# Patient Record
Sex: FEMALE | Race: WHITE | NOT HISPANIC OR LATINO | Employment: FULL TIME | URBAN - METROPOLITAN AREA
[De-identification: names, ages, dates, MRNs, and addresses within clinical notes are randomized per-mention and may not be internally consistent; named-entity substitution may affect disease eponyms.]

---

## 2021-08-10 ENCOUNTER — HOSPITAL ENCOUNTER (INPATIENT)
Facility: HOSPITAL | Age: 26
LOS: 2 days | Discharge: HOME | End: 2021-08-13
Attending: INTERNAL MEDICINE | Admitting: INTERNAL MEDICINE

## 2021-08-10 DIAGNOSIS — K50.014 CROHN'S DISEASE OF SMALL INTESTINE WITH ABSCESS (HCC): Primary | ICD-10-CM

## 2021-08-10 PROBLEM — K50.90 INFLAMMATORY BOWEL DISEASE (CROHN'S DISEASE) (HCC): Chronic | Status: ACTIVE | Noted: 2021-08-10

## 2021-08-10 PROBLEM — K50.114 CROHN'S DISEASE OF COLON WITH ABSCESS (HCC): Status: ACTIVE | Noted: 2021-08-10

## 2021-08-10 PROCEDURE — 99222 1ST HOSP IP/OBS MODERATE 55: CPT | Performed by: PHYSICIAN ASSISTANT

## 2021-08-11 ENCOUNTER — APPOINTMENT (OUTPATIENT)
Dept: OTHER | Facility: HOSPITAL | Age: 26
End: 2021-08-11

## 2021-08-11 ENCOUNTER — INPATIENT HOSPITAL (OUTPATIENT)
Dept: URBAN - METROPOLITAN AREA HOSPITAL 84 | Facility: HOSPITAL | Age: 26
End: 2021-08-11

## 2021-08-11 DIAGNOSIS — R74.8 ABNORMAL LEVELS OF OTHER SERUM ENZYMES: ICD-10-CM

## 2021-08-11 DIAGNOSIS — R93.3 ABNORMAL FINDINGS ON DIAGNOSTIC IMAGING OF OTHER PARTS OF DI: ICD-10-CM

## 2021-08-11 DIAGNOSIS — D50.9 IRON DEFICIENCY ANEMIA, UNSPECIFIED: ICD-10-CM

## 2021-08-11 DIAGNOSIS — R10.31 RIGHT LOWER QUADRANT PAIN: ICD-10-CM

## 2021-08-11 DIAGNOSIS — K50.114 CROHN'S DISEASE OF LARGE INTESTINE WITH ABSCESS: ICD-10-CM

## 2021-08-11 PROBLEM — K50.90 EXACERBATION OF CROHN'S DISEASE (HCC): Status: ACTIVE | Noted: 2021-08-11

## 2021-08-11 PROBLEM — R31.9 HEMATURIA: Chronic | Status: ACTIVE | Noted: 2021-08-11

## 2021-08-11 PROBLEM — R74.01 TRANSAMINITIS: Status: ACTIVE | Noted: 2021-08-11

## 2021-08-11 PROBLEM — D75.839 THROMBOCYTOSIS: Chronic | Status: ACTIVE | Noted: 2021-08-11

## 2021-08-11 LAB
ABO GROUP BLD: NORMAL
ALBUMIN SERPL-MCNC: 3.1 G/DL (ref 3.5–5.2)
ALBUMIN/GLOB SERPL: 0.7 G/DL
ALP SERPL-CCNC: 129 U/L (ref 39–117)
ALT SERPL W P-5'-P-CCNC: 31 U/L (ref 1–33)
ANION GAP SERPL CALCULATED.3IONS-SCNC: 10 MMOL/L (ref 5–15)
ANION GAP SERPL CALCULATED.3IONS-SCNC: 19 MMOL/L (ref 5–15)
AST SERPL-CCNC: 21 U/L (ref 1–32)
B-HCG UR QL: NEGATIVE
BACTERIA UR QL AUTO: ABNORMAL /HPF
BASOPHILS # BLD AUTO: 0 10*3/MM3 (ref 0–0.2)
BASOPHILS NFR BLD AUTO: 0.4 % (ref 0–1.5)
BILIRUB SERPL-MCNC: 0.3 MG/DL (ref 0–1.2)
BILIRUB UR QL STRIP: NEGATIVE
BLD GP AB SCN SERPL QL: NEGATIVE
BUN SERPL-MCNC: 4 MG/DL (ref 6–20)
BUN SERPL-MCNC: 5 MG/DL (ref 6–20)
BUN/CREAT SERPL: 6.3 (ref 7–25)
BUN/CREAT SERPL: 6.3 (ref 7–25)
CALCIUM SPEC-SCNC: 8.5 MG/DL (ref 8.6–10.5)
CALCIUM SPEC-SCNC: 8.6 MG/DL (ref 8.6–10.5)
CHLORIDE SERPL-SCNC: 106 MMOL/L (ref 98–107)
CHLORIDE SERPL-SCNC: 113 MMOL/L (ref 98–107)
CLARITY UR: CLEAR
CO2 SERPL-SCNC: 20 MMOL/L (ref 22–29)
CO2 SERPL-SCNC: 23 MMOL/L (ref 22–29)
COLOR UR: YELLOW
CREAT SERPL-MCNC: 0.64 MG/DL (ref 0.57–1)
CREAT SERPL-MCNC: 0.8 MG/DL (ref 0.57–1)
CRP SERPL-MCNC: 21.03 MG/DL (ref 0–0.5)
D-LACTATE SERPL-SCNC: 1 MMOL/L (ref 0.5–2)
DEPRECATED RDW RBC AUTO: 39.8 FL (ref 37–54)
EOSINOPHIL # BLD AUTO: 0.1 10*3/MM3 (ref 0–0.4)
EOSINOPHIL NFR BLD AUTO: 0.9 % (ref 0.3–6.2)
ERYTHROCYTE [DISTWIDTH] IN BLOOD BY AUTOMATED COUNT: 15.6 % (ref 12.3–15.4)
ERYTHROCYTE [SEDIMENTATION RATE] IN BLOOD: 101 MM/HR (ref 0–20)
GFR SERPL CREATININE-BSD FRML MDRD: 113 ML/MIN/1.73
GFR SERPL CREATININE-BSD FRML MDRD: 87 ML/MIN/1.73
GLOBULIN UR ELPH-MCNC: 4.3 GM/DL
GLUCOSE SERPL-MCNC: 77 MG/DL (ref 65–99)
GLUCOSE SERPL-MCNC: 98 MG/DL (ref 65–99)
GLUCOSE UR STRIP-MCNC: NEGATIVE MG/DL
HCT VFR BLD AUTO: 32.9 % (ref 34–46.6)
HGB BLD-MCNC: 10.8 G/DL (ref 12–15.9)
HGB UR QL STRIP.AUTO: ABNORMAL
HOLD SPECIMEN: NORMAL
HYALINE CASTS UR QL AUTO: ABNORMAL /LPF
KETONES UR QL STRIP: ABNORMAL
LEUKOCYTE ESTERASE UR QL STRIP.AUTO: NEGATIVE
LIPASE SERPL-CCNC: 7 U/L (ref 13–60)
LYMPHOCYTES # BLD AUTO: 2.3 10*3/MM3 (ref 0.7–3.1)
LYMPHOCYTES NFR BLD AUTO: 22.1 % (ref 19.6–45.3)
MAGNESIUM SERPL-MCNC: 2.1 MG/DL (ref 1.6–2.6)
MCH RBC QN AUTO: 23.9 PG (ref 26.6–33)
MCHC RBC AUTO-ENTMCNC: 32.7 G/DL (ref 31.5–35.7)
MCV RBC AUTO: 72.9 FL (ref 79–97)
MONOCYTES # BLD AUTO: 1 10*3/MM3 (ref 0.1–0.9)
MONOCYTES NFR BLD AUTO: 9.5 % (ref 5–12)
NEUTROPHILS NFR BLD AUTO: 67.1 % (ref 42.7–76)
NEUTROPHILS NFR BLD AUTO: 7.1 10*3/MM3 (ref 1.7–7)
NITRITE UR QL STRIP: NEGATIVE
NRBC BLD AUTO-RTO: 0 /100 WBC (ref 0–0.2)
PH UR STRIP.AUTO: 6 [PH] (ref 5–8)
PLATELET # BLD AUTO: 666 10*3/MM3 (ref 140–450)
PMV BLD AUTO: 6.6 FL (ref 6–12)
POTASSIUM SERPL-SCNC: 3.4 MMOL/L (ref 3.5–5.2)
POTASSIUM SERPL-SCNC: 3.9 MMOL/L (ref 3.5–5.2)
PROT SERPL-MCNC: 7.4 G/DL (ref 6–8.5)
PROT UR QL STRIP: NEGATIVE
RBC # BLD AUTO: 4.51 10*6/MM3 (ref 3.77–5.28)
RBC # UR: ABNORMAL /HPF
REF LAB TEST METHOD: ABNORMAL
RH BLD: NEGATIVE
SARS-COV-2 RNA PNL SPEC NAA+PROBE: NOT DETECTED
SODIUM SERPL-SCNC: 139 MMOL/L (ref 136–145)
SODIUM SERPL-SCNC: 152 MMOL/L (ref 136–145)
SP GR UR STRIP: 1.04 (ref 1–1.03)
SQUAMOUS #/AREA URNS HPF: ABNORMAL /HPF
T&S EXPIRATION DATE: NORMAL
UROBILINOGEN UR QL STRIP: ABNORMAL
WBC # BLD AUTO: 10.6 10*3/MM3 (ref 3.4–10.8)
WBC UR QL AUTO: ABNORMAL /HPF
WHOLE BLOOD HOLD SPECIMEN: NORMAL

## 2021-08-11 PROCEDURE — 80053 COMPREHEN METABOLIC PANEL: CPT | Performed by: PHYSICIAN ASSISTANT

## 2021-08-11 PROCEDURE — 25010000002 HYDROMORPHONE PER 4 MG: Performed by: STUDENT IN AN ORGANIZED HEALTH CARE EDUCATION/TRAINING PROGRAM

## 2021-08-11 PROCEDURE — U0003 INFECTIOUS AGENT DETECTION BY NUCLEIC ACID (DNA OR RNA); SEVERE ACUTE RESPIRATORY SYNDROME CORONAVIRUS 2 (SARS-COV-2) (CORONAVIRUS DISEASE [COVID-19]), AMPLIFIED PROBE TECHNIQUE, MAKING USE OF HIGH THROUGHPUT TECHNOLOGIES AS DESCRIBED BY CMS-2020-01-R: HCPCS | Performed by: INTERNAL MEDICINE

## 2021-08-11 PROCEDURE — 83690 ASSAY OF LIPASE: CPT | Performed by: PHYSICIAN ASSISTANT

## 2021-08-11 PROCEDURE — 86900 BLOOD TYPING SEROLOGIC ABO: CPT | Performed by: PHYSICIAN ASSISTANT

## 2021-08-11 PROCEDURE — 85652 RBC SED RATE AUTOMATED: CPT | Performed by: PHYSICIAN ASSISTANT

## 2021-08-11 PROCEDURE — 86900 BLOOD TYPING SEROLOGIC ABO: CPT

## 2021-08-11 PROCEDURE — 86140 C-REACTIVE PROTEIN: CPT | Performed by: PHYSICIAN ASSISTANT

## 2021-08-11 PROCEDURE — 81001 URINALYSIS AUTO W/SCOPE: CPT | Performed by: PHYSICIAN ASSISTANT

## 2021-08-11 PROCEDURE — 86901 BLOOD TYPING SEROLOGIC RH(D): CPT

## 2021-08-11 PROCEDURE — 25010000002 MORPHINE PER 10 MG: Performed by: PHYSICIAN ASSISTANT

## 2021-08-11 PROCEDURE — 80048 BASIC METABOLIC PNL TOTAL CA: CPT | Performed by: INTERNAL MEDICINE

## 2021-08-11 PROCEDURE — 81025 URINE PREGNANCY TEST: CPT | Performed by: PHYSICIAN ASSISTANT

## 2021-08-11 PROCEDURE — 83735 ASSAY OF MAGNESIUM: CPT | Performed by: PHYSICIAN ASSISTANT

## 2021-08-11 PROCEDURE — 86901 BLOOD TYPING SEROLOGIC RH(D): CPT | Performed by: PHYSICIAN ASSISTANT

## 2021-08-11 PROCEDURE — 99233 SBSQ HOSP IP/OBS HIGH 50: CPT | Performed by: INTERNAL MEDICINE

## 2021-08-11 PROCEDURE — 25010000002 PIPERACILLIN SOD-TAZOBACTAM PER 1 G: Performed by: PHYSICIAN ASSISTANT

## 2021-08-11 PROCEDURE — 83605 ASSAY OF LACTIC ACID: CPT | Performed by: PHYSICIAN ASSISTANT

## 2021-08-11 PROCEDURE — 25010000002 ONDANSETRON PER 1 MG: Performed by: PHYSICIAN ASSISTANT

## 2021-08-11 PROCEDURE — 85025 COMPLETE CBC W/AUTO DIFF WBC: CPT | Performed by: INTERNAL MEDICINE

## 2021-08-11 PROCEDURE — 99222 1ST HOSP IP/OBS MODERATE 55: CPT | Performed by: NURSE PRACTITIONER

## 2021-08-11 PROCEDURE — 87040 BLOOD CULTURE FOR BACTERIA: CPT | Performed by: PHYSICIAN ASSISTANT

## 2021-08-11 PROCEDURE — 99221 1ST HOSP IP/OBS SF/LOW 40: CPT | Performed by: STUDENT IN AN ORGANIZED HEALTH CARE EDUCATION/TRAINING PROGRAM

## 2021-08-11 PROCEDURE — 86850 RBC ANTIBODY SCREEN: CPT | Performed by: PHYSICIAN ASSISTANT

## 2021-08-11 PROCEDURE — 87086 URINE CULTURE/COLONY COUNT: CPT | Performed by: PHYSICIAN ASSISTANT

## 2021-08-11 RX ORDER — ACETAMINOPHEN 160 MG/5ML
650 SOLUTION ORAL EVERY 4 HOURS PRN
Status: DISCONTINUED | OUTPATIENT
Start: 2021-08-10 | End: 2021-08-12

## 2021-08-11 RX ORDER — NITROGLYCERIN 0.4 MG/1
0.4 TABLET SUBLINGUAL
Status: DISCONTINUED | OUTPATIENT
Start: 2021-08-10 | End: 2021-08-13 | Stop reason: HOSPADM

## 2021-08-11 RX ORDER — MAGNESIUM SULFATE 1 G/100ML
1 INJECTION INTRAVENOUS AS NEEDED
Status: DISCONTINUED | OUTPATIENT
Start: 2021-08-10 | End: 2021-08-13 | Stop reason: HOSPADM

## 2021-08-11 RX ORDER — ALUMINA, MAGNESIA, AND SIMETHICONE 2400; 2400; 240 MG/30ML; MG/30ML; MG/30ML
15 SUSPENSION ORAL EVERY 6 HOURS PRN
Status: DISCONTINUED | OUTPATIENT
Start: 2021-08-10 | End: 2021-08-13 | Stop reason: HOSPADM

## 2021-08-11 RX ORDER — MAGNESIUM SULFATE HEPTAHYDRATE 40 MG/ML
2 INJECTION, SOLUTION INTRAVENOUS AS NEEDED
Status: DISCONTINUED | OUTPATIENT
Start: 2021-08-10 | End: 2021-08-13 | Stop reason: HOSPADM

## 2021-08-11 RX ORDER — SODIUM CHLORIDE, SODIUM LACTATE, POTASSIUM CHLORIDE, CALCIUM CHLORIDE 600; 310; 30; 20 MG/100ML; MG/100ML; MG/100ML; MG/100ML
100 INJECTION, SOLUTION INTRAVENOUS CONTINUOUS
Status: DISCONTINUED | OUTPATIENT
Start: 2021-08-11 | End: 2021-08-12

## 2021-08-11 RX ORDER — SODIUM CHLORIDE 9 MG/ML
75 INJECTION, SOLUTION INTRAVENOUS CONTINUOUS
Status: DISCONTINUED | OUTPATIENT
Start: 2021-08-11 | End: 2021-08-11

## 2021-08-11 RX ORDER — CHOLECALCIFEROL (VITAMIN D3) 125 MCG
5 CAPSULE ORAL NIGHTLY PRN
Status: DISCONTINUED | OUTPATIENT
Start: 2021-08-10 | End: 2021-08-11

## 2021-08-11 RX ORDER — ZOLPIDEM TARTRATE 5 MG/1
5 TABLET ORAL NIGHTLY PRN
Status: DISCONTINUED | OUTPATIENT
Start: 2021-08-11 | End: 2021-08-13 | Stop reason: HOSPADM

## 2021-08-11 RX ORDER — ONDANSETRON 4 MG/1
4 TABLET, FILM COATED ORAL EVERY 6 HOURS PRN
Status: DISCONTINUED | OUTPATIENT
Start: 2021-08-10 | End: 2021-08-13 | Stop reason: HOSPADM

## 2021-08-11 RX ORDER — HYDROMORPHONE HCL 110MG/55ML
0.5 PATIENT CONTROLLED ANALGESIA SYRINGE INTRAVENOUS
Status: DISCONTINUED | OUTPATIENT
Start: 2021-08-11 | End: 2021-08-12

## 2021-08-11 RX ORDER — ACETAMINOPHEN 325 MG/1
650 TABLET ORAL EVERY 4 HOURS PRN
Status: DISCONTINUED | OUTPATIENT
Start: 2021-08-10 | End: 2021-08-12

## 2021-08-11 RX ORDER — MORPHINE SULFATE 4 MG/ML
4 INJECTION, SOLUTION INTRAMUSCULAR; INTRAVENOUS EVERY 4 HOURS PRN
Status: DISCONTINUED | OUTPATIENT
Start: 2021-08-11 | End: 2021-08-11

## 2021-08-11 RX ORDER — POTASSIUM CHLORIDE 20 MEQ/1
40 TABLET, EXTENDED RELEASE ORAL AS NEEDED
Status: DISCONTINUED | OUTPATIENT
Start: 2021-08-10 | End: 2021-08-13 | Stop reason: HOSPADM

## 2021-08-11 RX ORDER — ACETAMINOPHEN 650 MG/1
650 SUPPOSITORY RECTAL EVERY 4 HOURS PRN
Status: DISCONTINUED | OUTPATIENT
Start: 2021-08-10 | End: 2021-08-12

## 2021-08-11 RX ORDER — BUDESONIDE 3 MG/1
9 CAPSULE, COATED PELLETS ORAL EVERY MORNING
COMMUNITY
End: 2021-08-13 | Stop reason: HOSPADM

## 2021-08-11 RX ORDER — ONDANSETRON 2 MG/ML
4 INJECTION INTRAMUSCULAR; INTRAVENOUS EVERY 6 HOURS PRN
Status: DISCONTINUED | OUTPATIENT
Start: 2021-08-10 | End: 2021-08-13 | Stop reason: HOSPADM

## 2021-08-11 RX ORDER — FERROUS SULFATE 325(65) MG
325 TABLET ORAL
COMMUNITY

## 2021-08-11 RX ORDER — SODIUM CHLORIDE 0.9 % (FLUSH) 0.9 %
10 SYRINGE (ML) INJECTION EVERY 12 HOURS SCHEDULED
Status: DISCONTINUED | OUTPATIENT
Start: 2021-08-11 | End: 2021-08-13 | Stop reason: HOSPADM

## 2021-08-11 RX ORDER — SODIUM CHLORIDE 0.9 % (FLUSH) 0.9 %
10 SYRINGE (ML) INJECTION AS NEEDED
Status: DISCONTINUED | OUTPATIENT
Start: 2021-08-10 | End: 2021-08-13 | Stop reason: HOSPADM

## 2021-08-11 RX ADMIN — SODIUM CHLORIDE, POTASSIUM CHLORIDE, SODIUM LACTATE AND CALCIUM CHLORIDE 100 ML/HR: 600; 310; 30; 20 INJECTION, SOLUTION INTRAVENOUS at 08:25

## 2021-08-11 RX ADMIN — MORPHINE SULFATE 4 MG: 4 INJECTION INTRAVENOUS at 05:08

## 2021-08-11 RX ADMIN — Medication 5 MG: at 01:12

## 2021-08-11 RX ADMIN — HYDROMORPHONE HYDROCHLORIDE 0.5 MG: 2 INJECTION, SOLUTION INTRAMUSCULAR; INTRAVENOUS; SUBCUTANEOUS at 20:38

## 2021-08-11 RX ADMIN — HYDROMORPHONE HYDROCHLORIDE 0.5 MG: 2 INJECTION, SOLUTION INTRAMUSCULAR; INTRAVENOUS; SUBCUTANEOUS at 11:09

## 2021-08-11 RX ADMIN — ONDANSETRON 4 MG: 2 INJECTION INTRAMUSCULAR; INTRAVENOUS at 04:31

## 2021-08-11 RX ADMIN — MORPHINE SULFATE 4 MG: 4 INJECTION INTRAVENOUS at 01:12

## 2021-08-11 RX ADMIN — PIPERACILLIN AND TAZOBACTAM 3.38 G: 3; .375 INJECTION, POWDER, LYOPHILIZED, FOR SOLUTION INTRAVENOUS at 08:23

## 2021-08-11 RX ADMIN — SODIUM CHLORIDE 75 ML/HR: 9 INJECTION, SOLUTION INTRAVENOUS at 01:05

## 2021-08-11 RX ADMIN — HYDROMORPHONE HYDROCHLORIDE 0.5 MG: 2 INJECTION, SOLUTION INTRAMUSCULAR; INTRAVENOUS; SUBCUTANEOUS at 08:24

## 2021-08-11 RX ADMIN — Medication 10 ML: at 08:28

## 2021-08-11 RX ADMIN — PIPERACILLIN SODIUM,TAZOBACTAM SODIUM 3.38 G: 3; .375 INJECTION, POWDER, FOR SOLUTION INTRAVENOUS at 03:33

## 2021-08-11 RX ADMIN — ZOLPIDEM TARTRATE 5 MG: 5 TABLET ORAL at 21:10

## 2021-08-11 RX ADMIN — HYDROMORPHONE HYDROCHLORIDE 0.5 MG: 2 INJECTION, SOLUTION INTRAMUSCULAR; INTRAVENOUS; SUBCUTANEOUS at 13:08

## 2021-08-11 RX ADMIN — ONDANSETRON 4 MG: 2 INJECTION INTRAMUSCULAR; INTRAVENOUS at 18:17

## 2021-08-11 RX ADMIN — PIPERACILLIN AND TAZOBACTAM 3.38 G: 3; .375 INJECTION, POWDER, LYOPHILIZED, FOR SOLUTION INTRAVENOUS at 16:59

## 2021-08-11 RX ADMIN — Medication 10 ML: at 20:38

## 2021-08-11 RX ADMIN — HYDROMORPHONE HYDROCHLORIDE 0.5 MG: 2 INJECTION, SOLUTION INTRAMUSCULAR; INTRAVENOUS; SUBCUTANEOUS at 17:05

## 2021-08-11 RX ADMIN — Medication 10 ML: at 01:16

## 2021-08-11 NOTE — PLAN OF CARE
Goal Outcome Evaluation:  Plan of Care Reviewed With: patient, mother        Progress: improving  Outcome Summary: No stools this AM. Does have c/o abd.pain. Pain meds changed. Surgery has seen & no surgical intervention needed. GI consult ordered.

## 2021-08-11 NOTE — CASE MANAGEMENT/SOCIAL WORK
Discharge Planning Assessment   [facility]     Patient Name: Carin Price  MRN: [id number]  Today's Date: 8/11/2021    Admit Date: 8/10/2021    Discharge Needs Assessment     Row Name 08/11/21 1225       Living Environment    Lives With  spouse    Current Living Arrangements  home/apartment/condo    Primary Care Provided by  self    Provides Primary Care For  no one    Able to Return to Prior Arrangements  yes       Resource/Environmental Concerns    Resource/Environmental Concerns  none    Transportation Concerns  car, none       Transition Planning    Patient/Family Anticipates Transition to  home with family    Patient/Family Anticipated Services at Transition  none       Discharge Needs Assessment    Readmission Within the Last 30 Days  no previous admission in last 30 days    Equipment Currently Used at Home  none    Concerns to be Addressed  denies needs/concerns at this time    Equipment Needed After Discharge  none    Discharge Coordination/Progress  DC Plan: Return home with spouse, no needs.        Discharge Plan     Row Name 08/11/21 1226       Plan    Plan  DC Plan: Return home with spouse, no needs. IV antibiotics.    Plan Comments  GI consult.        Continued Care and Services - Admitted Since 8/10/2021    Coordination has not been started for this encounter.         Demographic Summary     Row Name 08/11/21 1224       General Information    Admission Type  inpatient    Arrived From  emergency department    Referral Source  admission list    Reason for Consult  discharge planning    Preferred Language  English     Used During This Interaction  no    General Information Comments  Spoke to pt in room with mother and spouse.        Functional Status     Row Name 08/11/21 1225       Functional Status    Usual Activity Tolerance  good    Current Activity Tolerance  good       Functional Status, IADL    Medications  independent    Meal Preparation  independent    Housekeeping  independent    Laundry   independent    Shopping  independent       Mental Status    General Appearance WDL  WDL       Mental Status Summary    Recent Changes in Mental Status/Cognitive Functioning  no changes        Met with patient in room wearing PPE: mask, face, goggles, gloves, gown.      Maintained distance greater than six feet and spent less than 15 minutes in the room.               Eduardo Lyons RN

## 2021-08-11 NOTE — NURSING NOTE
Pt has not had any stools since arriving .  She report prn pain Morphine 4 mg IV, is effective only short term, within one hour after receiving pt. Is requesting additional pain med.

## 2021-08-11 NOTE — PLAN OF CARE
Goal Outcome Evaluation:     No change to report in pt. Status.  She continues to complain of mid upper abd pain, with slight radiation to the right side.  Described as off and on, and sharp in intensity.  Denies any nausea or vomiting.  She has received x 1 dose of Morphine at 0112, net due at 0512, and pt. Currently ranks pain at 7/10. No stools to report since arriving to the hospital.  Pt. Reports x 7 liquid stools at home yesterday.  Continue to monitor.

## 2021-08-11 NOTE — PROGRESS NOTES
[facility] Medicine Services Daily Progress Note    Patient Name: Carin Price  : 1995  MRN: [id number]  Primary Care Physician:  Janeth, No Known  Date of admission: 8/10/2021      Subjective      Chief Complaint:   *    Abdominal pain and diarrhea    History of Present Illness: Carin Price is a 25 y.o. female who presented to [facility] on 8/10/2021 complaining of abdominal pain for the past 4 days.  Patient states abdominal pain is about a 6 or 7 out of 10 that is somewhat diffuse in nature.  Patient states the pain is nonradiating.  Patient reports nothing seems to make the pain better or worse.  Patient reports some nausea but no vomiting.  Patient reports about 4 days of diarrhea but denies any blood or melena.  Patient denies any urinary symptoms, fever, cough, chest pain, shortness of breath or swelling in her legs bilaterally.  Of note, patient states she just started her menstrual cycle yesterday.  Patient reports that she has a history of Crohn's and this felt like a similar exacerbation to the past however the pain was not getting any better so she went to the ER at [facility].     In the ER [facility], CT abdomen pelvis with contrast shows complex process in the ileocolic mesentery probably representing fistulizing Crohn's with presumed abscess.  Worse since May 10, 2021.  UA showed trace blood but otherwise unremarkable.  Urine hCG negative.  CMP was remarkable for AST and ALT slightly elevated at 47 and 56 respectively as well as alk phos elevated at 177.  Lactic acid normal.  Lipase normal.  White blood cell count elevated 15.5, hemoglobin of 11.7, low MCV and low MCH.  Platelets elevated at 769 what appears to be chronic per ED provider.  Patient was given Dilaudid, morphine, Zofran and Zosyn in the ED.       patient Reports    Patient reports generalized abdominal discomfort and diarrhea has resolved        Patient seen  and examined earlier today  She had no further diarrhea after admission  We are waiting on her stool studies  She was seen by general surgeon who called me and recommended no surgical intervention is required at this time and ask for GI consultation  GI consulted for further assistance with her care          ROS *all other systems reviewed and negative      Objective      Vitals:   Temp:  [98 °F (36.7 °C)-98.4 °F (36.9 °C)] 98.4 °F (36.9 °C)  Heart Rate:  [76-88] 76  Resp:  [17] 17  BP: (128-149)/(74-89) 128/74    Physical Exam  Vitals and nursing note reviewed.   Constitutional:       General: She is not in acute distress.     Appearance: Normal appearance. She is well-developed. She is not ill-appearing, toxic-appearing or diaphoretic.   HENT:      Head: Normocephalic and atraumatic.      Right Ear: Ear canal and external ear normal.      Left Ear: Ear canal and external ear normal.      Nose: Nose normal. No congestion or rhinorrhea.      Mouth/Throat:      Mouth: Mucous membranes are moist.      Pharynx: No oropharyngeal exudate.   Eyes:      General: No scleral icterus.        Right eye: No discharge.         Left eye: No discharge.      Extraocular Movements: Extraocular movements intact.      Conjunctiva/sclera: Conjunctivae normal.      Pupils: Pupils are equal, round, and reactive to light.   Neck:      Thyroid: No thyromegaly.      Vascular: No carotid bruit or JVD.      Trachea: No tracheal deviation.   Cardiovascular:      Rate and Rhythm: Normal rate and regular rhythm.      Pulses: Normal pulses.      Heart sounds: Normal heart sounds. No murmur heard.   No friction rub. No gallop.    Pulmonary:      Effort: Pulmonary effort is normal. No respiratory distress.      Breath sounds: Normal breath sounds. No stridor. No wheezing, rhonchi or rales.   Chest:      Chest wall: No tenderness.   Abdominal:      General: Bowel sounds are normal. There is no distension.      Palpations: Abdomen is soft. There is  no mass.      Tenderness: There is abdominal tenderness in the right lower quadrant. There is no guarding or rebound.      Hernia: No hernia is present.   Musculoskeletal:         General: No swelling, tenderness, deformity or signs of injury. Normal range of motion.      Cervical back: Normal range of motion and neck supple. No rigidity. No muscular tenderness.      Right lower leg: No edema.      Left lower leg: No edema.   Lymphadenopathy:      Cervical: No cervical adenopathy.   Skin:     General: Skin is warm and dry.      Coloration: Skin is not jaundiced or pale.      Findings: No bruising, erythema or rash.   Neurological:      General: No focal deficit present.      Mental Status: She is alert and oriented to person, place, and time. Mental status is at baseline.      Cranial Nerves: No cranial nerve deficit.      Sensory: No sensory deficit.      Motor: No weakness or abnormal muscle tone.      Coordination: Coordination normal.   Psychiatric:         Mood and Affect: Mood normal.         Behavior: Behavior normal.         Thought Content: Thought content normal.         Judgment: Judgment normal.            Result Review    Result Review:  I have personally reviewed the results from the time of this admission to 8/11/2021 09:03 EDT and agree with these findings:  [x]  Laboratory  [x]  Microbiology  [x]  Radiology  [x]  EKG/Telemetry   [x]  Cardiology/Vascular   []  Pathology  []  Old records  []  Other:  Most notable findings include:            Assessment/Plan      Brief Patient Summary:  Carin Price is a 25 y.o. female who presents with Crohn's flare      piperacillin-tazobactam, 3.375 g, Intravenous, Q8H  sodium chloride, 10 mL, Intravenous, Q12H       lactated ringers, 100 mL/hr, Last Rate: 100 mL/hr (08/11/21 1795)         Active Hospital Problems:  Active Hospital Problems    Diagnosis    • **Crohn's disease of colon with abscess (CMS/HCC)    • Exacerbation of Crohn's disease (CMS/HCC)    •  Hematuria    • Transaminitis    • Microcytic hypochromic anemia    • Thrombocytosis (CMS/HCC)    • Inflammatory bowel disease (Crohn's disease) (CMS/HCC)      Plan:   Exacerbation of Crohn's disease with fistulization and abscess  -Likely cause of abdominal pain  -CT abdomen pelvis with contrast shows complex process in the ileocolic mesentery probably representing fistulizing Crohn's with presumed abscess.  Worse since May 10, 2021.     -White blood cell count elevated 15.5, Lactic acid normal.   -Patient was given Dilaudid, morphine, Zofran and Zosyn in the ED.   -General surgery consulted, Dr. Laureano who agreed to see patient upon transfer to [facility]  -Monitor labs  Replete electrolytes as needed  hCG negative  -Continue Zosyn  -Morphine as needed for pain control, Zofran as needed for nausea  -Continue IV fluids 75 mL/h normal saline  -N.p.o. until seen by GI  -General surgeon reports no intervention at this time required      Hypernatremia possibly due to poor free water oral intake  She was changed by , general surgeon  Transaminitis  -CMP was remarkable for AST and ALT slightly elevated at 47 and 56 respectively as well as alk phos elevated at 177.      Microcytic hypochromic anemia  -Patient is currently on menstrual cycle, possibly secondary to iron deficiency  -hemoglobin of 11.7, low MCV and low MCH.   -Continue iron supplement when no longer n.p.o.  -Monitor hemoglobin     Thrombocytosis  -Platelets elevated at 769 what appears to be chronic per ED provider.    Likely reactive  We will monitor      DVT prophylaxis:  Mechanical DVT prophylaxis orders are present.    CODE STATUS:    Code Status: CPR  Medical Interventions (Level of Support Prior to Arrest): Full      Disposition:  I expect patient to be discharged home      This patient has been examined wearing appropriate Personal Protective Equipment and discussed with hospital infection control department. 08/11/21      Electronically signed by  Mo Estrella MD, 08/11/21, 09:03 EDT.  [facility]ist Team

## 2021-08-11 NOTE — CONSULTS
General Surgery Consult Note      Name: Carin Price ADMIT: 8/10/2021   : 1995  PCP: Provider, No Known    MRN: [id number] LOS: 0 days   AGE/SEX: 25 y.o. female  ROOM: [address] [place] [address]      Patient Care Team:  Provider, No Known as PCP - General  No chief complaint on file.      Subjective   25-year-old lady with recent diagnosis of Crohn's in May after she presented to an outside facility with right lower quadrant abdominal pain and fever.  She underwent CT scan at that time, images are not available here, but she also underwent colonoscopy confirming the diagnosis of Crohn's.  She sees a gastroenterologist in [facility], and was started on budesonide therapy.  She had been doing well on therapy with no pain no frequent stools, no bloody bowel movements.  However, she ran out of her budesonide earlier this week and after that she developed right lower quadrant as well as epigastric abdominal pain as well as a fever to 102.  She denies nausea or vomiting, she has been passing flatus however has not had a bowel movement in 2 days.  She presented to an outside facility in [facility] and underwent a CT scan of her abdomen and pelvis showing what was read as an abscess at her terminal terminal ileum with suspected fistulizing Crohn's.  She was transferred to our facility for surgical evaluation as well as possible drain placement.  This morning on my evaluation her fever has resolved, her leukocytosis has resolved, she continues to have no nausea or vomiting, she still has not had a bowel movement, her pain is improving.    Past Medical History:   Diagnosis Date   • Crohn's disease (CMS/HCC)      Past Surgical History:   Procedure Laterality Date   • LAPAROSCOPIC CHOLECYSTECTOMY       Family History   Problem Relation Age of Onset   • No Known Problems Mother    • No Known Problems Father      Social History     Tobacco Use   • Smoking status: Never Smoker   Vaping Use   • Vaping Use: Never used  "  Substance Use Topics   • Alcohol use: Not Currently   • Drug use: Never     Medications Prior to Admission   Medication Sig Dispense Refill Last Dose   • Budesonide (ENTOCORT EC) 3 MG 24 hr capsule Take 9 mg by mouth Every Morning.      • ferrous sulfate 325 (65 FE) MG tablet Take 325 mg by mouth Daily With Breakfast.        piperacillin-tazobactam, 3.375 g, Intravenous, Q8H  sodium chloride, 10 mL, Intravenous, Q12H      lactated ringers, 100 mL/hr      •  acetaminophen **OR** acetaminophen **OR** acetaminophen  •  aluminum-magnesium hydroxide-simethicone  •  HYDROmorphone  •  magnesium sulfate **OR** magnesium sulfate in D5W 1g/100mL (PREMIX)  •  melatonin  •  nitroglycerin  •  ondansetron **OR** ondansetron  •  potassium chloride  •  sodium chloride  Patient has no known allergies.    Review of Systems   Constitutional: Negative for chills and fever.   HENT: Negative for rhinorrhea and trouble swallowing.    Eyes: Negative for blurred vision and double vision.   Respiratory: Negative for cough and shortness of breath.    Cardiovascular: Negative for chest pain and palpitations.   Gastrointestinal: Positive for abdominal pain. Negative for blood in stool.   Musculoskeletal: Negative for back pain and myalgias.   Skin: Negative for color change and dry skin.   Neurological: Negative for headache and confusion.   Psychiatric/Behavioral: Negative for agitation and hallucinations.        Objective     Vital Signs and Labs:  Vital Signs Patient Vitals for the past 24 hrs:   BP Temp Temp src Pulse Resp SpO2 Height Weight   08/11/21 0414 149/89 98.3 °F (36.8 °C) Oral 78 17 100 % -- 104 kg (230 lb 3.2 oz)   08/11/21 0100 -- -- -- -- -- 96 % 170.2 cm (67\") 103 kg (227 lb 14.4 oz)   08/11/21 0023 139/88 98 °F (36.7 °C) Oral 88 -- -- -- --     I/O:   Intake/Output Summary (Last 24 hours) at 8/11/2021 0814  Last data filed at 8/11/2021 0333  Gross per 24 hour   Intake 1340 ml   Output --   Net 1340 ml       Physical " Exam:  Physical Exam  Constitutional:       General: She is not in acute distress.     Appearance: Normal appearance. She is not ill-appearing.   HENT:      Head: Normocephalic and atraumatic.      Right Ear: External ear normal.      Left Ear: External ear normal.   Eyes:      Extraocular Movements: Extraocular movements intact.      Conjunctiva/sclera: Conjunctivae normal.   Cardiovascular:      Rate and Rhythm: Normal rate and regular rhythm.   Pulmonary:      Effort: Pulmonary effort is normal. No respiratory distress.   Abdominal:      General: There is no distension.      Palpations: Abdomen is soft.      Tenderness: There is abdominal tenderness (right lower quadrant, mild). There is no guarding or rebound.   Musculoskeletal:         General: No swelling or deformity.   Skin:     General: Skin is warm and dry.   Neurological:      Mental Status: She is alert and oriented to person, place, and time. Mental status is at baseline.         CBC    Results from last 7 days   Lab Units 08/11/21  0628   WBC 10*3/mm3 10.60   HEMOGLOBIN g/dL 10.8*   PLATELETS 10*3/mm3 666*     BMP   Results from last 7 days   Lab Units 08/11/21  0025   SODIUM mmol/L 152*   POTASSIUM mmol/L 3.9   CHLORIDE mmol/L 113*   CO2 mmol/L 20.0*   BUN mg/dL 4*   CREATININE mg/dL 0.64   GLUCOSE mg/dL 98   MAGNESIUM mg/dL 2.1     Infection     CMP   Results from last 7 days   Lab Units 08/11/21  0025   SODIUM mmol/L 152*   POTASSIUM mmol/L 3.9   CHLORIDE mmol/L 113*   CO2 mmol/L 20.0*   BUN mg/dL 4*   CREATININE mg/dL 0.64   GLUCOSE mg/dL 98   ALBUMIN g/dL 3.10*   BILIRUBIN mg/dL 0.3   ALK PHOS U/L 129*   AST (SGOT) U/L 21   ALT (SGPT) U/L 31   LIPASE U/L 7*       I reviewed the patient's new clinical results.  I have read reviewed the CT abdomen and pelvis from the outside facility, as well as the read.  I have reviewed her labs from the outside facility as well as her labs here.  I have made medication and IV fluid changes.  I will discuss this  patient with the admitting provider.    Assessment/Plan       Crohn's disease of colon with abscess (CMS/HCC)    Inflammatory bowel disease (Crohn's disease) (CMS/HCC)    Exacerbation of Crohn's disease (CMS/HCC)    Hematuria    Transaminitis    Microcytic hypochromic anemia    Thrombocytosis (CMS/HCC)      25 y.o. female with recent diagnosis of Crohn's disease.  Had been doing well on budesonide therapy, her gastroenterologist is based out of [facility].  She ran out of her budesonide medication, and is now admitted with a Crohn's flare.  CT from outside hospital reviewed, area of concern is mostly phlegmon at her terminal ileum, with very small fluid component.  Patient unlikely to benefit from IR drainage at this point, and I suspect she will improve with antibiotic therapy.  May have slight ileus, but no obstruction seen on CT scan.  White blood cell count has already normalized.      Plan:  -Increased frequency of IV pain medications, okay to start her on p.o. pain medication once tolerating p.o.  -She appears to be developing slight hypernatremia and hyperchloremia, likely from saline resuscitation, switched her to lactated Ringer's.  -Microcytic anemia noted, likely anemia of chronic disease.  -Keep n.p.o. this morning, if okay with GI, okay for clear liquids from my standpoint, monitor for return of bowel function patient may develop ileus with ongoing intra-abdominal infection.  -Recommend GI consult, may need escalation in her Crohn's medication versus resuming budesonide.  -No surgical intervention planned at this time, recommend holding off on interventional radiology drainage at this point as CT scan findings are mostly phlegmon, with very small fluid component.  -Recommend continuing Zosyn, can switch to p.o. antibiotics once tolerating p.o. and no signs of worsening infection, will follow up blood cultures  -Okay for DVT prophylaxis from my standpoint      This note was created using Dragon Voice  Recognition software.    Audie Laureano MD  08/11/21  07:59 EDT

## 2021-08-11 NOTE — CONSULTS
GI CONSULT  NOTE:    Referring Provider: Dr. Estrella    Chief complaint: Diarrhea and abdominal pain    Subjective .     History of present illness: Patient is a 25-year-old female with history of cholecystectomy and recently diagnosed Crohn's disease in May 2021.  She reports experiencing right side pain and fever back in May 2021.  CT scan was abnormal and she underwent colonoscopy in [facility] at [facility] by Dr. Lobo.  She was diagnosed with Crohn's disease and started on budesonide.  She was off the budesonide for 1 week and 5 days ago she reports eating a cheeseburger and shortly following this developed extreme diarrhea as well as right-sided abdominal pain.  Prior to 5 days ago she was having 1 or 2 soft bowel movements daily.  Denies bright red blood per rectum or melena.  She has been using some Excedrin lately.  No recent antibiotic use or other new medication.  She does feel she has had a fever lately.  No nausea or vomiting.      Endo History:  Patient reports colonoscopy May 2021 in [facility]    Past Medical History:  Past Medical History:   Diagnosis Date   • Crohn's disease (CMS/HCC)        Past Surgical History:  Past Surgical History:   Procedure Laterality Date   • LAPAROSCOPIC CHOLECYSTECTOMY         Social History:  Social History     Tobacco Use   • Smoking status: Never Smoker   Vaping Use   • Vaping Use: Never used   Substance Use Topics   • Alcohol use: Not Currently   • Drug use: Never       Family History: Patient reports an aunt with Crohn's disease  Family History   Problem Relation Age of Onset   • No Known Problems Mother    • No Known Problems Father        Medications:  Medications Prior to Admission   Medication Sig Dispense Refill Last Dose   • Budesonide (ENTOCORT EC) 3 MG 24 hr capsule Take 9 mg by mouth Every Morning.      • ferrous sulfate 325 (65 FE) MG tablet Take 325 mg by mouth Daily With Breakfast.          Scheduled Meds:piperacillin-tazobactam, 3.375 g,  "Intravenous, Q8H  sodium chloride, 10 mL, Intravenous, Q12H      Continuous Infusions:lactated ringers, 100 mL/hr, Last Rate: 100 mL/hr (08/11/21 0825)      PRN Meds:.•  acetaminophen **OR** acetaminophen **OR** acetaminophen  •  aluminum-magnesium hydroxide-simethicone  •  HYDROmorphone  •  magnesium sulfate **OR** magnesium sulfate in D5W 1g/100mL (PREMIX)  •  melatonin  •  nitroglycerin  •  ondansetron **OR** ondansetron  •  potassium chloride  •  sodium chloride    ALLERGIES:  Patient has no known allergies.    ROS:  Review of Systems   Constitutional: Positive for chills and fever.   HENT: Negative for congestion and trouble swallowing.    Respiratory: Negative for cough.    Cardiovascular: Negative for chest pain and palpitations.   Gastrointestinal: Positive for abdominal pain and diarrhea. Negative for blood in stool, nausea and vomiting.   Musculoskeletal: Negative for arthralgias and back pain.   Neurological: Negative for dizziness and weakness.   Psychiatric/Behavioral: Negative for agitation and confusion.       Objective     Vital Signs:   Visit Vitals  /74 (BP Location: Right arm, Patient Position: Lying)   Pulse 69   Temp 97.9 °F (36.6 °C) (Oral)   Resp 20   Ht 170.2 cm (67\")   Wt 104 kg (230 lb 3.2 oz)   SpO2 100%   BMI 36.05 kg/m²       Physical Exam:      General Appearance:    Awake and alert, in no acute distress   Head:    Normocephalic, without obvious abnormality, atraumatic   Eyes:            Conjunctivae normal, anicteric sclera   Ears:    Ears appear intact with no abnormalities noted   Throat:   No oral lesions, no thrush, oral mucosa moist   Neck:   No adenopathy, supple, no thyromegaly, no JVD   Lungs:     Respirations regular, even and unlabored       Chest Wall:    No abnormalities observed   Abdomen:     Soft, tender right lower quadrant, no rebound or guarding, non-distended, no hepatosplenomegaly   Rectal:     Deferred   Extremities:   Moves all extremities well, no edema, no " cyanosis, no             redness   Pulses:   Pulses palpable and equal bilaterally   Skin:   No bleeding, bruising or rash, no jaundice   Lymph nodes:   No palpable adenopathy   Neurologic:   Cranial nerves 2 - 12 grossly intact, no asterixis, sensation intact       Results Review:   I reviewed the patient's labs and imaging.  CBC  Results from last 7 days   Lab Units 08/11/21  0628   RBC 10*6/mm3 4.51   WBC 10*3/mm3 10.60   HEMOGLOBIN g/dL 10.8*   PLATELETS 10*3/mm3 666*       CMP  Results from last 7 days   Lab Units 08/11/21  0025   SODIUM mmol/L 152*   POTASSIUM mmol/L 3.9   CHLORIDE mmol/L 113*   CO2 mmol/L 20.0*   BUN mg/dL 4*   CREATININE mg/dL 0.64   GLUCOSE mg/dL 98   ALBUMIN g/dL 3.10*   BILIRUBIN mg/dL 0.3   ALK PHOS U/L 129*   AST (SGOT) U/L 21   ALT (SGPT) U/L 31       Amylase and Lipase  Results from last 7 days   Lab Units 08/11/21  0025   LIPASE U/L 7*       CRP     Results from last 7 days   Lab Units 08/11/21  0025   CRP mg/dL 21.03*       Imaging Results (Last 24 Hours)     Procedure Component Value Units Date/Time    CT Outside Films [[id number]] Resulted: 08/11/21 0750     Updated: 08/11/21 0750    Narrative:      This procedure was auto-finalized with no dictation required.            ASSESSMENT AND PLAN:    Acute diarrhea  Right lower quadrant abdominal pain  Recently diagnosed Crohn's disease in May 2021  Abnormal CT suggesting ileocolic complex process with fistulizing Crohn's disease and presumed abscess  Microcytic anemia  Elevated alk phos  History of cholecystectomy    Principal Problem:    Crohn's disease of colon with abscess (CMS/HCC)  Active Problems:    Inflammatory bowel disease (Crohn's disease) (CMS/HCC)    Exacerbation of Crohn's disease (CMS/HCC)    Hematuria    Transaminitis    Microcytic hypochromic anemia    Thrombocytosis (CMS/HCC)     Plan:  25-year-old female with recently diagnosed Crohn's disease presented to the hospital as a transfer from [place] with abnormal CT  suggesting fistulizing Crohn's disease and presumed abscess.  She notes possible fevers lately.  She was having formed stools until 5 days ago when she had sudden onset diarrhea.  Hemoglobin 10.8 and MCV 72.9.  Check iron studies and replace iron.   and CRP 21.  She has been treated only with outpatient budesonide for Crohn's but was off for 1 week.  Recommend she continue now on antibiotics and Zosyn was initiated.  Discussed need for further treatment of Crohn's once this resolves.  Surgery consulted but no plans for intervention.  Also follow-up on stool studies and rule out other infectious etiology.  We will obtain her recent colonoscopy report for review.    I discussed the patients findings and my recommendations with the patient.  Rekha Jernigan, GASTON  08/11/21  13:33 EDT

## 2021-08-11 NOTE — H&P
[facility] Medicine Services      Patient Name: Carin Price  : 1995  MRN: [id number]  Primary Care Physician:  Provider, No Known  Date of admission: 8/10/2021      Subjective      Chief Complaint: Abdominal pain    History of Present Illness: Carin Price is a 25 y.o. female who presented to [facility] on 8/10/2021 complaining of abdominal pain for the past 4 days.  Patient states abdominal pain is about a 6 or 7 out of 10 that is somewhat diffuse in nature.  Patient states the pain is nonradiating.  Patient reports nothing seems to make the pain better or worse.  Patient reports some nausea but no vomiting.  Patient reports about 4 days of diarrhea but denies any blood or melena.  Patient denies any urinary symptoms, fever, cough, chest pain, shortness of breath or swelling in her legs bilaterally.  Of note, patient states she just started her menstrual cycle yesterday.  Patient reports that she has a history of Crohn's and this felt like a similar exacerbation to the past however the pain was not getting any better so she went to the ER at [facility].    In the ER [facility], CT abdomen pelvis with contrast shows complex process in the ileocolic mesentery probably representing fistulizing Crohn's with presumed abscess.  Worse since May 10, 2021.  UA showed trace blood but otherwise unremarkable.  Urine hCG negative.  CMP was remarkable for AST and ALT slightly elevated at 47 and 56 respectively as well as alk phos elevated at 177.  Lactic acid normal.  Lipase normal.  White blood cell count elevated 15.5, hemoglobin of 11.7, low MCV and low MCH.  Platelets elevated at 769 what appears to be chronic per ED provider.  Patient was given Dilaudid, morphine, Zofran and Zosyn in the ED.      Review of Systems   Constitutional: Negative. Negative for chills, fever and malaise/fatigue.   HENT: Negative.    Cardiovascular: Negative.  Negative for chest  pain, dyspnea on exertion and near-syncope.   Respiratory: Negative.  Negative for cough and shortness of breath.    Skin: Negative.    Musculoskeletal: Negative.    Gastrointestinal: Positive for abdominal pain, diarrhea and nausea. Negative for hematochezia, hemorrhoids, jaundice, melena and vomiting.   Genitourinary: Negative.  Negative for dysuria, flank pain, frequency and hematuria.   Neurological: Negative.  Negative for dizziness and light-headedness.   Psychiatric/Behavioral: Negative.         Personal History     Past Medical History:   Diagnosis Date   • Crohn's disease (CMS/HCC)        Past Surgical History:   Procedure Laterality Date   • LAPAROSCOPIC CHOLECYSTECTOMY         Family History: family history includes No Known Problems in her father and mother. Otherwise pertinent FHx was reviewed and not pertinent to current issue.    Social History:  reports that she has never smoked. She does not have any smokeless tobacco history on file. She reports previous alcohol use. She reports that she does not use drugs.    Home Medications:  Prior to Admission Medications     None            Allergies:  No Known Allergies    Objective      Vitals:   Temp:  [98 °F (36.7 °C)] 98 °F (36.7 °C)  Heart Rate:  [88] 88  BP: (139)/(88) 139/88    Physical Exam  Vitals and nursing note reviewed.   Constitutional:       General: She is not in acute distress.     Appearance: Normal appearance. She is well-developed. She is not diaphoretic.   HENT:      Head: Normocephalic and atraumatic.      Nose: Nose normal.      Mouth/Throat:      Pharynx: No oropharyngeal exudate.   Eyes:      Extraocular Movements: Extraocular movements intact.      Conjunctiva/sclera: Conjunctivae normal.      Pupils: Pupils are equal, round, and reactive to light.   Cardiovascular:      Rate and Rhythm: Normal rate and regular rhythm.      Heart sounds: Normal heart sounds.      Comments: S1, S2 audible.  Pulmonary:      Effort: Pulmonary effort is  normal. No respiratory distress.      Breath sounds: Normal breath sounds. No wheezing, rhonchi or rales.      Comments: On room air.  Abdominal:      General: Bowel sounds are normal. There is no distension.      Palpations: Abdomen is soft.      Tenderness: There is no abdominal tenderness. There is no right CVA tenderness, left CVA tenderness, guarding or rebound.   Musculoskeletal:         General: No tenderness or deformity. Normal range of motion.      Cervical back: Normal range of motion.   Skin:     General: Skin is warm.      Capillary Refill: Capillary refill takes less than 2 seconds.      Findings: No erythema or rash.   Neurological:      Mental Status: She is alert and oriented to person, place, and time.      Cranial Nerves: No cranial nerve deficit.      Sensory: No sensory deficit.      Motor: No weakness.   Psychiatric:         Mood and Affect: Mood normal.         Behavior: Behavior normal.          Result Review    Result Review:  I have personally reviewed the results from the time of this admission to 8/11/2021 00:40 EDT and agree with these findings:  [x]  Laboratory  [x]  Microbiology  [x]  Radiology  []  EKG/Telemetry   []  Cardiology/Vascular   []  Pathology  []  Old records  []  Other:        Assessment/Plan        Active Hospital Problems:  Active Hospital Problems    Diagnosis    • **Crohn's disease of colon with abscess (CMS/HCC)    • Exacerbation of Crohn's disease (CMS/HCC)    • Hematuria    • Transaminitis    • Microcytic hypochromic anemia    • Thrombocytosis (CMS/HCC)    • Inflammatory bowel disease (Crohn's disease) (CMS/HCC)      Plan:     Exacerbation of Crohn's disease with fistulization and abscess  -Likely cause of abdominal pain  -CT abdomen pelvis with contrast shows complex process in the ileocolic mesentery probably representing fistulizing Crohn's with presumed abscess.  Worse since May 10, 2021.     -White blood cell count elevated 15.5, Lactic acid normal.   -Patient  was given Dilaudid, morphine, Zofran and Zosyn in the ED.   -General surgery consulted, Dr. Laureano who agreed to see patient upon transfer to [facility]  -Check CBC, CMP, ESR, CRP, type and screen, UA, blood cultures, lactic acid, HCG urine, lipase  -Continue Zosyn  -Morphine as needed for pain control, Zofran as needed for nausea  -Continue IV fluids 75 mL/h normal saline  -N.p.o.    Transaminitis  -CMP was remarkable for AST and ALT slightly elevated at 47 and 56 respectively as well as alk phos elevated at 177.     Microcytic hypochromic anemia  -Patient is currently on menstrual cycle, possibly secondary to iron deficiency  -hemoglobin of 11.7, low MCV and low MCH.   -Continue iron supplement when no longer n.p.o.  -Monitor hemoglobin    Thrombocytosis  -Platelets elevated at 769 what appears to be chronic per ED provider.        DVT prophylaxis:  Mechanical DVT prophylaxis orders are present.  SCDs      CODE STATUS:    Code Status: CPR  Medical Interventions (Level of Support Prior to Arrest): Full    Admission Status:  I believe this patient meets inpatient status.    I discussed the patient's findings and my recommendations with patient.    This patient has been examined wearing appropriate Personal Protective Equipment and discussed with hospital infection control department. 08/11/21      Signature: Electronically signed by ELLIOTT Venegas, 08/11/21, 12:35 AM EDT.

## 2021-08-11 NOTE — NURSING NOTE
Notified 's office (General surgery) via answering service, spoke with Eliane of consult order for today.

## 2021-08-12 ENCOUNTER — INPATIENT HOSPITAL (OUTPATIENT)
Dept: URBAN - METROPOLITAN AREA HOSPITAL 84 | Facility: HOSPITAL | Age: 26
End: 2021-08-12

## 2021-08-12 DIAGNOSIS — K50.114 CROHN'S DISEASE OF LARGE INTESTINE WITH ABSCESS: ICD-10-CM

## 2021-08-12 DIAGNOSIS — R74.8 ABNORMAL LEVELS OF OTHER SERUM ENZYMES: ICD-10-CM

## 2021-08-12 DIAGNOSIS — D50.9 IRON DEFICIENCY ANEMIA, UNSPECIFIED: ICD-10-CM

## 2021-08-12 DIAGNOSIS — R10.31 RIGHT LOWER QUADRANT PAIN: ICD-10-CM

## 2021-08-12 DIAGNOSIS — R93.3 ABNORMAL FINDINGS ON DIAGNOSTIC IMAGING OF OTHER PARTS OF DI: ICD-10-CM

## 2021-08-12 LAB
ADV 40+41 DNA STL QL NAA+NON-PROBE: NOT DETECTED
ALBUMIN SERPL-MCNC: 3.2 G/DL (ref 3.5–5.2)
ALBUMIN/GLOB SERPL: 0.8 G/DL
ALP SERPL-CCNC: 125 U/L (ref 39–117)
ALT SERPL W P-5'-P-CCNC: 23 U/L (ref 1–33)
ANION GAP SERPL CALCULATED.3IONS-SCNC: 10 MMOL/L (ref 5–15)
AST SERPL-CCNC: 15 U/L (ref 1–32)
ASTRO TYP 1-8 RNA STL QL NAA+NON-PROBE: NOT DETECTED
BACTERIA SPEC AEROBE CULT: NO GROWTH
BASOPHILS # BLD AUTO: 0.1 10*3/MM3 (ref 0–0.2)
BASOPHILS NFR BLD AUTO: 1.1 % (ref 0–1.5)
BILIRUB SERPL-MCNC: 0.4 MG/DL (ref 0–1.2)
BUN SERPL-MCNC: 3 MG/DL (ref 6–20)
BUN/CREAT SERPL: 3.6 (ref 7–25)
C CAYETANENSIS DNA STL QL NAA+NON-PROBE: NOT DETECTED
C COLI+JEJ+UPSA DNA STL QL NAA+NON-PROBE: NOT DETECTED
C DIFF GDH STL QL: NEGATIVE
CALCIUM SPEC-SCNC: 8.8 MG/DL (ref 8.6–10.5)
CHLORIDE SERPL-SCNC: 99 MMOL/L (ref 98–107)
CO2 SERPL-SCNC: 26 MMOL/L (ref 22–29)
CREAT SERPL-MCNC: 0.83 MG/DL (ref 0.57–1)
CRYPTOSP DNA STL QL NAA+NON-PROBE: NOT DETECTED
DEPRECATED RDW RBC AUTO: 40.7 FL (ref 37–54)
E HISTOLYT DNA STL QL NAA+NON-PROBE: NOT DETECTED
EAEC PAA PLAS AGGR+AATA ST NAA+NON-PRB: NOT DETECTED
EC STX1+STX2 GENES STL QL NAA+NON-PROBE: NOT DETECTED
EOSINOPHIL # BLD AUTO: 0.2 10*3/MM3 (ref 0–0.4)
EOSINOPHIL NFR BLD AUTO: 1.5 % (ref 0.3–6.2)
EPEC EAE GENE STL QL NAA+NON-PROBE: NOT DETECTED
ERYTHROCYTE [DISTWIDTH] IN BLOOD BY AUTOMATED COUNT: 15.8 % (ref 12.3–15.4)
ETEC LTA+ST1A+ST1B TOX ST NAA+NON-PROBE: NOT DETECTED
G LAMBLIA DNA STL QL NAA+NON-PROBE: NOT DETECTED
GFR SERPL CREATININE-BSD FRML MDRD: 84 ML/MIN/1.73
GLOBULIN UR ELPH-MCNC: 4.2 GM/DL
GLUCOSE SERPL-MCNC: 86 MG/DL (ref 65–99)
HCT VFR BLD AUTO: 34 % (ref 34–46.6)
HGB BLD-MCNC: 11 G/DL (ref 12–15.9)
LYMPHOCYTES # BLD AUTO: 2.7 10*3/MM3 (ref 0.7–3.1)
LYMPHOCYTES NFR BLD AUTO: 24.2 % (ref 19.6–45.3)
MAGNESIUM SERPL-MCNC: 2 MG/DL (ref 1.6–2.6)
MCH RBC QN AUTO: 23.3 PG (ref 26.6–33)
MCHC RBC AUTO-ENTMCNC: 32.3 G/DL (ref 31.5–35.7)
MCV RBC AUTO: 72.1 FL (ref 79–97)
MONOCYTES # BLD AUTO: 0.9 10*3/MM3 (ref 0.1–0.9)
MONOCYTES NFR BLD AUTO: 8 % (ref 5–12)
NEUTROPHILS NFR BLD AUTO: 65.2 % (ref 42.7–76)
NEUTROPHILS NFR BLD AUTO: 7.2 10*3/MM3 (ref 1.7–7)
NOROVIRUS GI+II RNA STL QL NAA+NON-PROBE: NOT DETECTED
NRBC BLD AUTO-RTO: 0 /100 WBC (ref 0–0.2)
P SHIGELLOIDES DNA STL QL NAA+NON-PROBE: NOT DETECTED
PLATELET # BLD AUTO: 724 10*3/MM3 (ref 140–450)
PMV BLD AUTO: 6.4 FL (ref 6–12)
POTASSIUM SERPL-SCNC: 3.3 MMOL/L (ref 3.5–5.2)
PROT SERPL-MCNC: 7.4 G/DL (ref 6–8.5)
RBC # BLD AUTO: 4.71 10*6/MM3 (ref 3.77–5.28)
RVA RNA STL QL NAA+NON-PROBE: NOT DETECTED
S ENT+BONG DNA STL QL NAA+NON-PROBE: NOT DETECTED
SAPO I+II+IV+V RNA STL QL NAA+NON-PROBE: NOT DETECTED
SHIGELLA SP+EIEC IPAH ST NAA+NON-PROBE: NOT DETECTED
SODIUM SERPL-SCNC: 135 MMOL/L (ref 136–145)
V CHOL+PARA+VUL DNA STL QL NAA+NON-PROBE: NOT DETECTED
V CHOLERAE DNA STL QL NAA+NON-PROBE: NOT DETECTED
WBC # BLD AUTO: 11.1 10*3/MM3 (ref 3.4–10.8)
Y ENTEROCOL DNA STL QL NAA+NON-PROBE: NOT DETECTED

## 2021-08-12 PROCEDURE — 80053 COMPREHEN METABOLIC PANEL: CPT | Performed by: INTERNAL MEDICINE

## 2021-08-12 PROCEDURE — 87449 NOS EACH ORGANISM AG IA: CPT | Performed by: PHYSICIAN ASSISTANT

## 2021-08-12 PROCEDURE — 25010000002 PIPERACILLIN SOD-TAZOBACTAM PER 1 G: Performed by: PHYSICIAN ASSISTANT

## 2021-08-12 PROCEDURE — 25010000002 ONDANSETRON PER 1 MG: Performed by: PHYSICIAN ASSISTANT

## 2021-08-12 PROCEDURE — 99232 SBSQ HOSP IP/OBS MODERATE 35: CPT | Performed by: NURSE PRACTITIONER

## 2021-08-12 PROCEDURE — 99232 SBSQ HOSP IP/OBS MODERATE 35: CPT | Performed by: INTERNAL MEDICINE

## 2021-08-12 PROCEDURE — 87324 CLOSTRIDIUM AG IA: CPT | Performed by: PHYSICIAN ASSISTANT

## 2021-08-12 PROCEDURE — 0097U HC BIOFIRE FILMARRAY GI PANEL: CPT | Performed by: PHYSICIAN ASSISTANT

## 2021-08-12 PROCEDURE — 85025 COMPLETE CBC W/AUTO DIFF WBC: CPT | Performed by: STUDENT IN AN ORGANIZED HEALTH CARE EDUCATION/TRAINING PROGRAM

## 2021-08-12 PROCEDURE — 99232 SBSQ HOSP IP/OBS MODERATE 35: CPT | Performed by: STUDENT IN AN ORGANIZED HEALTH CARE EDUCATION/TRAINING PROGRAM

## 2021-08-12 PROCEDURE — 25010000002 HYDROMORPHONE PER 4 MG: Performed by: STUDENT IN AN ORGANIZED HEALTH CARE EDUCATION/TRAINING PROGRAM

## 2021-08-12 PROCEDURE — 25010000002 ENOXAPARIN PER 10 MG: Performed by: STUDENT IN AN ORGANIZED HEALTH CARE EDUCATION/TRAINING PROGRAM

## 2021-08-12 PROCEDURE — 83735 ASSAY OF MAGNESIUM: CPT | Performed by: PHYSICIAN ASSISTANT

## 2021-08-12 RX ORDER — HYDROMORPHONE HCL 110MG/55ML
0.5 PATIENT CONTROLLED ANALGESIA SYRINGE INTRAVENOUS EVERY 4 HOURS PRN
Status: DISCONTINUED | OUTPATIENT
Start: 2021-08-12 | End: 2021-08-13 | Stop reason: HOSPADM

## 2021-08-12 RX ORDER — ACETAMINOPHEN 500 MG
1000 TABLET ORAL EVERY 8 HOURS
Status: DISCONTINUED | OUTPATIENT
Start: 2021-08-12 | End: 2021-08-13 | Stop reason: HOSPADM

## 2021-08-12 RX ORDER — DEXTROSE, SODIUM CHLORIDE, AND POTASSIUM CHLORIDE 5; .45; .15 G/100ML; G/100ML; G/100ML
50 INJECTION INTRAVENOUS CONTINUOUS
Status: DISCONTINUED | OUTPATIENT
Start: 2021-08-12 | End: 2021-08-13 | Stop reason: HOSPADM

## 2021-08-12 RX ORDER — OXYCODONE HYDROCHLORIDE 5 MG/1
5 TABLET ORAL EVERY 4 HOURS PRN
Status: DISCONTINUED | OUTPATIENT
Start: 2021-08-12 | End: 2021-08-13 | Stop reason: HOSPADM

## 2021-08-12 RX ORDER — OXYCODONE HYDROCHLORIDE 5 MG/1
10 TABLET ORAL EVERY 4 HOURS PRN
Status: DISCONTINUED | OUTPATIENT
Start: 2021-08-12 | End: 2021-08-13 | Stop reason: HOSPADM

## 2021-08-12 RX ADMIN — ENOXAPARIN SODIUM 40 MG: 40 INJECTION SUBCUTANEOUS at 16:10

## 2021-08-12 RX ADMIN — PIPERACILLIN AND TAZOBACTAM 3.38 G: 3; .375 INJECTION, POWDER, LYOPHILIZED, FOR SOLUTION INTRAVENOUS at 08:31

## 2021-08-12 RX ADMIN — HYDROMORPHONE HYDROCHLORIDE 0.5 MG: 2 INJECTION, SOLUTION INTRAMUSCULAR; INTRAVENOUS; SUBCUTANEOUS at 00:09

## 2021-08-12 RX ADMIN — HYDROMORPHONE HYDROCHLORIDE 0.5 MG: 2 INJECTION, SOLUTION INTRAMUSCULAR; INTRAVENOUS; SUBCUTANEOUS at 02:57

## 2021-08-12 RX ADMIN — OXYCODONE 10 MG: 5 TABLET ORAL at 16:11

## 2021-08-12 RX ADMIN — Medication 10 ML: at 10:06

## 2021-08-12 RX ADMIN — ACETAMINOPHEN 1000 MG: 500 TABLET, FILM COATED ORAL at 16:10

## 2021-08-12 RX ADMIN — POTASSIUM CHLORIDE, DEXTROSE MONOHYDRATE AND SODIUM CHLORIDE 50 ML/HR: 150; 5; 450 INJECTION, SOLUTION INTRAVENOUS at 16:14

## 2021-08-12 RX ADMIN — ONDANSETRON 4 MG: 2 INJECTION INTRAMUSCULAR; INTRAVENOUS at 18:00

## 2021-08-12 RX ADMIN — ZOLPIDEM TARTRATE 5 MG: 5 TABLET ORAL at 21:32

## 2021-08-12 RX ADMIN — ONDANSETRON 4 MG: 2 INJECTION INTRAMUSCULAR; INTRAVENOUS at 10:32

## 2021-08-12 RX ADMIN — PIPERACILLIN AND TAZOBACTAM 3.38 G: 3; .375 INJECTION, POWDER, LYOPHILIZED, FOR SOLUTION INTRAVENOUS at 00:13

## 2021-08-12 RX ADMIN — PIPERACILLIN AND TAZOBACTAM 3.38 G: 3; .375 INJECTION, POWDER, LYOPHILIZED, FOR SOLUTION INTRAVENOUS at 16:14

## 2021-08-12 RX ADMIN — HYDROMORPHONE HYDROCHLORIDE 0.5 MG: 2 INJECTION, SOLUTION INTRAMUSCULAR; INTRAVENOUS; SUBCUTANEOUS at 08:30

## 2021-08-12 RX ADMIN — HYDROMORPHONE HYDROCHLORIDE 0.5 MG: 2 INJECTION, SOLUTION INTRAMUSCULAR; INTRAVENOUS; SUBCUTANEOUS at 18:00

## 2021-08-12 RX ADMIN — HYDROMORPHONE HYDROCHLORIDE 0.5 MG: 2 INJECTION, SOLUTION INTRAMUSCULAR; INTRAVENOUS; SUBCUTANEOUS at 10:33

## 2021-08-12 NOTE — PLAN OF CARE
Goal Outcome Evaluation:  Plan of Care Reviewed With: patient           Outcome Summary: Patient has rested at times during night.  Continued c/o abd pain with relief from IV pain medication.  Patient reports no stools

## 2021-08-12 NOTE — PLAN OF CARE
Goal Outcome Evaluation:  Plan of Care Reviewed With: patient, spouse        Progress: improving   Pt had some relief with po pain meds but required IV Dilaudid for persistent pain.  Ate poorly at dinner.  Family at bedside.  In no apparent distress.

## 2021-08-12 NOTE — PROGRESS NOTES
General Surgery Progress Note    Name: Carin Price ADMIT: 8/10/2021   : 1995  PCP: Provider, No Known    MRN: [id number] LOS: 1 days   AGE/SEX: 25 y.o. female  ROOM: [address] [place] [address]    No chief complaint on file.    Subjective     25 y.o. female admitted with Crohn's exacerbation, and intra-abdominal phlegmon.  Feeling slightly better today, pain improving.  Still does not have much of an appetite however she is eating a little bit.  No nausea or vomiting, she is passing flatus.  No fevers.    Objective     Scheduled Medications:   acetaminophen, 1,000 mg, Oral, Q8H  enoxaparin, 40 mg, Subcutaneous, Nightly  piperacillin-tazobactam, 3.375 g, Intravenous, Q8H  sodium chloride, 10 mL, Intravenous, Q12H        Active Infusions:  dextrose 5 % and sodium chloride 0.45 % with KCl 20 mEq/L, 50 mL/hr        As Needed Medications:  aluminum-magnesium hydroxide-simethicone  •  HYDROmorphone  •  magnesium sulfate **OR** magnesium sulfate in D5W 1g/100mL (PREMIX)  •  nitroglycerin  •  ondansetron **OR** ondansetron  •  oxyCODONE  •  oxyCODONE  •  potassium chloride  •  sodium chloride  •  zolpidem    Vital Signs  Vital Signs Patient Vitals for the past 24 hrs:   BP Temp Temp src Pulse Resp SpO2 Weight   21 1312 118/78 98.6 °F (37 °C) Oral 79 16 99 % --   21 0700 109/69 -- -- -- -- -- --   21 0517 -- -- -- -- -- -- 104 kg (230 lb)   21 0310 113/68 99.5 °F (37.5 °C) Oral 80 16 96 % --   21 0004 121/75 99.2 °F (37.3 °C) Oral 98 16 97 % --   21 1851 126/68 98.2 °F (36.8 °C) Oral 87 15 99 % --     I/O:  I/O last 3 completed shifts:  In: 1920 [P.O.:720; I.V.:1000; IV Piggyback:200]  Out: -     Physical Exam:  Physical Exam    Results Review:     CBC    Results from last 7 days   Lab Units 21  0724 21  0628   WBC 10*3/mm3 11.10* 10.60   HEMOGLOBIN g/dL 11.0* 10.8*   PLATELETS 10*3/mm3 724* 666*     BMP   Results from last 7 days   Lab Units 21  0724 21  1239  08/11/21  0025   SODIUM mmol/L 135* 139 152*   POTASSIUM mmol/L 3.3* 3.4* 3.9   CHLORIDE mmol/L 99 106 113*   CO2 mmol/L 26.0 23.0 20.0*   BUN mg/dL 3* 5* 4*   CREATININE mg/dL 0.83 0.80 0.64   GLUCOSE mg/dL 86 77 98   MAGNESIUM mg/dL 2.0  --  2.1     CMP   Results from last 7 days   Lab Units 08/12/21  0724 08/11/21  1239 08/11/21  0025   SODIUM mmol/L 135* 139 152*   POTASSIUM mmol/L 3.3* 3.4* 3.9   CHLORIDE mmol/L 99 106 113*   CO2 mmol/L 26.0 23.0 20.0*   BUN mg/dL 3* 5* 4*   CREATININE mg/dL 0.83 0.80 0.64   GLUCOSE mg/dL 86 77 98   ALBUMIN g/dL 3.20*  --  3.10*   BILIRUBIN mg/dL 0.4  --  0.3   ALK PHOS U/L 125*  --  129*   AST (SGOT) U/L 15  --  21   ALT (SGPT) U/L 23  --  31   LIPASE U/L  --   --  7*       I reviewed the patient's new clinical results.  I reviewed her labs and orders.    Assessment/Plan       Crohn's disease of colon with abscess (CMS/HCC)    Inflammatory bowel disease (Crohn's disease) (CMS/HCC)    Exacerbation of Crohn's disease (CMS/HCC)    Hematuria    Transaminitis    Microcytic hypochromic anemia    Thrombocytosis (CMS/HCC)      25 y.o. female with recent diagnosis of Crohn's disease, admitted for Crohn's flare with phlegmon.  White blood cell count remains stable and mildly elevated, she is afebrile and her vital signs are normal, her abdominal exam is improving.      Plan:  -Space out IV pain medication, scheduling Tylenol, starting p.o. narcotics.   -Switch to maintenance fluids, can stop these when she starts tolerating more by mouth.  -She appears to be developing slight hypernatremia and hyperchloremia, likely from saline resuscitation, switched  -Regular diet, monitor for bowel function  -GI plans noted, I agree with IV antibiotics for 1 more day, okay to switch to oral antibiotics tomorrow from my standpoint if white count remains low and exam continues to improve.  I agree with interval imaging to evaluate for resolution of patient's phlegmon.    -Lovenox for DVT  prophylaxis  -I will continue to follow while inpatient, and would be happy to see her in my office if she ultimately requires drainage or ileocecectomy.      This note was created using Dragon Voice Recognition software.    Audie Laureano MD  08/12/21  15:53 EDT

## 2021-08-12 NOTE — PROGRESS NOTES
LOS: 1 day   Patient Care Team:  Provider, No Known as PCP - General      Subjective     Subjective: Patient continues with right lower quadrant pain requiring pain medication.  No rectal bleeding.  No nausea/vomiting and tolerating some of her diet.      ROS:   Review of Systems   Respiratory: Negative for cough and shortness of breath.    Cardiovascular: Negative for chest pain and palpitations.   Gastrointestinal: Positive for abdominal pain. Negative for blood in stool, diarrhea, nausea and vomiting.   Neurological: Negative for dizziness and weakness.   Psychiatric/Behavioral: Negative for agitation and confusion.        Medication Review:   Scheduled Meds:piperacillin-tazobactam, 3.375 g, Intravenous, Q8H  sodium chloride, 10 mL, Intravenous, Q12H      Continuous Infusions:lactated ringers, 100 mL/hr, Last Rate: 100 mL/hr (08/11/21 0825)      PRN Meds:.•  acetaminophen **OR** acetaminophen **OR** acetaminophen  •  aluminum-magnesium hydroxide-simethicone  •  HYDROmorphone  •  magnesium sulfate **OR** magnesium sulfate in D5W 1g/100mL (PREMIX)  •  nitroglycerin  •  ondansetron **OR** ondansetron  •  potassium chloride  •  sodium chloride  •  zolpidem      Objective     Vital Signs  Temp:  [97.9 °F (36.6 °C)-99.5 °F (37.5 °C)] 99.5 °F (37.5 °C)  Heart Rate:  [69-98] 80  Resp:  [15-20] 16  BP: (109-126)/(68-75) 109/69    Physical Exam:    General Appearance:    Awake and alert, in no acute distress   Head:    Normocephalic, without obvious abnormality   Eyes:          Conjunctivae normal, anicteric sclera   Ears:    Hearing intact   Throat:   No oral lesions, no thrush, oral mucosa moist   Neck:   No adenopathy, supple, no JVD   Lungs:     Respirations regular, even and unlabored       Abdomen:     Soft, tenderness right lower quadrant, no rebound or guarding, non-distended, no hepatosplenomegaly   Rectal:     Deferred   Extremities:   No edema, no cyanosis, no redness   Skin:   No bleeding, bruising or rash, no  jaundice   Neurologic:   Cranial nerves 2 - 12 grossly intact, no asterixis, sensation   intact        Results Review:    CBC  Results from last 7 days   Lab Units 08/12/21  0724 08/11/21  0628   RBC 10*6/mm3 4.71 4.51   WBC 10*3/mm3 11.10* 10.60   HEMOGLOBIN g/dL 11.0* 10.8*   PLATELETS 10*3/mm3 724* 666*       CMP  Results from last 7 days   Lab Units 08/12/21  0724 08/11/21  1239 08/11/21  0025   SODIUM mmol/L 135* 139 152*   POTASSIUM mmol/L 3.3* 3.4* 3.9   CHLORIDE mmol/L 99 106 113*   CO2 mmol/L 26.0 23.0 20.0*   BUN mg/dL 3* 5* 4*   CREATININE mg/dL 0.83 0.80 0.64   GLUCOSE mg/dL 86 77 98   ALBUMIN g/dL 3.20*  --  3.10*   BILIRUBIN mg/dL 0.4  --  0.3   ALK PHOS U/L 125*  --  129*   AST (SGOT) U/L 15  --  21   ALT (SGPT) U/L 23  --  31       Amylase and Lipase  Results from last 7 days   Lab Units 08/11/21  0025   LIPASE U/L 7*       CRP     Results from last 7 days   Lab Units 08/11/21  0025   CRP mg/dL 21.03*       Imaging Results (Last 24 Hours)     ** No results found for the last 24 hours. **            Assessment/Plan     Acute diarrhea  Right lower quadrant abdominal pain  Recently diagnosed Crohn's disease in May 2021  Abnormal CT suggesting ileocolic complex process with fistulizing Crohn's disease and presumed abscess  Microcytic anemia  Elevated alk phos  History of cholecystectomy     Plan:  Patient continues with right lower quadrant abdominal pain.  She has been able to eat some.  WBC 11.1 from 10.6.  GI stool PCR panel negative and C. difficile negative.  Recommend that patient continue on IV antibiotics at least for 1 more day.  Once her pain is improving we will plan to transition to oral antibiotics.  Plan is for repeat imaging of the abdomen in about 4 weeks and if abscess is gone, plan more aggressive treatment of Crohn's disease likely with Humira.  Continue supportive care.    GASTON Shell  08/12/21  10:22 EDT

## 2021-08-13 ENCOUNTER — INPATIENT HOSPITAL (OUTPATIENT)
Dept: URBAN - METROPOLITAN AREA HOSPITAL 84 | Facility: HOSPITAL | Age: 26
End: 2021-08-13

## 2021-08-13 VITALS
HEART RATE: 79 BPM | BODY MASS INDEX: 35.99 KG/M2 | RESPIRATION RATE: 18 BRPM | TEMPERATURE: 97.9 F | WEIGHT: 229.28 LBS | OXYGEN SATURATION: 96 % | SYSTOLIC BLOOD PRESSURE: 113 MMHG | HEIGHT: 67 IN | DIASTOLIC BLOOD PRESSURE: 70 MMHG

## 2021-08-13 DIAGNOSIS — R10.31 RIGHT LOWER QUADRANT PAIN: ICD-10-CM

## 2021-08-13 DIAGNOSIS — K50.114 CROHN'S DISEASE OF LARGE INTESTINE WITH ABSCESS: ICD-10-CM

## 2021-08-13 DIAGNOSIS — D50.9 IRON DEFICIENCY ANEMIA, UNSPECIFIED: ICD-10-CM

## 2021-08-13 DIAGNOSIS — R74.8 ABNORMAL LEVELS OF OTHER SERUM ENZYMES: ICD-10-CM

## 2021-08-13 LAB
ALBUMIN SERPL-MCNC: 2.8 G/DL (ref 3.5–5.2)
ALBUMIN/GLOB SERPL: 0.7 G/DL
ALP SERPL-CCNC: 102 U/L (ref 39–117)
ALT SERPL W P-5'-P-CCNC: 15 U/L (ref 1–33)
ANION GAP SERPL CALCULATED.3IONS-SCNC: 8 MMOL/L (ref 5–15)
AST SERPL-CCNC: 10 U/L (ref 1–32)
BASOPHILS # BLD AUTO: 0 10*3/MM3 (ref 0–0.2)
BASOPHILS NFR BLD AUTO: 0.4 % (ref 0–1.5)
BILIRUB SERPL-MCNC: 0.3 MG/DL (ref 0–1.2)
BUN SERPL-MCNC: 3 MG/DL (ref 6–20)
BUN/CREAT SERPL: 4.1 (ref 7–25)
CALCIUM SPEC-SCNC: 8.3 MG/DL (ref 8.6–10.5)
CHLORIDE SERPL-SCNC: 104 MMOL/L (ref 98–107)
CO2 SERPL-SCNC: 27 MMOL/L (ref 22–29)
CREAT SERPL-MCNC: 0.74 MG/DL (ref 0.57–1)
DEPRECATED RDW RBC AUTO: 40.7 FL (ref 37–54)
EOSINOPHIL # BLD AUTO: 0.2 10*3/MM3 (ref 0–0.4)
EOSINOPHIL NFR BLD AUTO: 2.3 % (ref 0.3–6.2)
ERYTHROCYTE [DISTWIDTH] IN BLOOD BY AUTOMATED COUNT: 15.8 % (ref 12.3–15.4)
GFR SERPL CREATININE-BSD FRML MDRD: 96 ML/MIN/1.73
GLOBULIN UR ELPH-MCNC: 3.9 GM/DL
GLUCOSE SERPL-MCNC: 79 MG/DL (ref 65–99)
HCT VFR BLD AUTO: 31.1 % (ref 34–46.6)
HGB BLD-MCNC: 10.2 G/DL (ref 12–15.9)
LYMPHOCYTES # BLD AUTO: 2.3 10*3/MM3 (ref 0.7–3.1)
LYMPHOCYTES NFR BLD AUTO: 21.5 % (ref 19.6–45.3)
MAGNESIUM SERPL-MCNC: 2 MG/DL (ref 1.6–2.6)
MCH RBC QN AUTO: 23.7 PG (ref 26.6–33)
MCHC RBC AUTO-ENTMCNC: 32.8 G/DL (ref 31.5–35.7)
MCV RBC AUTO: 72.3 FL (ref 79–97)
MONOCYTES # BLD AUTO: 1 10*3/MM3 (ref 0.1–0.9)
MONOCYTES NFR BLD AUTO: 9.3 % (ref 5–12)
NEUTROPHILS NFR BLD AUTO: 66.5 % (ref 42.7–76)
NEUTROPHILS NFR BLD AUTO: 7.1 10*3/MM3 (ref 1.7–7)
NRBC BLD AUTO-RTO: 0 /100 WBC (ref 0–0.2)
PLATELET # BLD AUTO: 616 10*3/MM3 (ref 140–450)
PMV BLD AUTO: 6.6 FL (ref 6–12)
POTASSIUM SERPL-SCNC: 3.1 MMOL/L (ref 3.5–5.2)
PROT SERPL-MCNC: 6.7 G/DL (ref 6–8.5)
RBC # BLD AUTO: 4.3 10*6/MM3 (ref 3.77–5.28)
SODIUM SERPL-SCNC: 139 MMOL/L (ref 136–145)
WBC # BLD AUTO: 10.7 10*3/MM3 (ref 3.4–10.8)

## 2021-08-13 PROCEDURE — 25010000002 HYDROMORPHONE PER 4 MG: Performed by: STUDENT IN AN ORGANIZED HEALTH CARE EDUCATION/TRAINING PROGRAM

## 2021-08-13 PROCEDURE — 25010000002 PIPERACILLIN SOD-TAZOBACTAM PER 1 G: Performed by: PHYSICIAN ASSISTANT

## 2021-08-13 PROCEDURE — 83735 ASSAY OF MAGNESIUM: CPT | Performed by: PHYSICIAN ASSISTANT

## 2021-08-13 PROCEDURE — 85025 COMPLETE CBC W/AUTO DIFF WBC: CPT | Performed by: STUDENT IN AN ORGANIZED HEALTH CARE EDUCATION/TRAINING PROGRAM

## 2021-08-13 PROCEDURE — 99233 SBSQ HOSP IP/OBS HIGH 50: CPT | Performed by: NURSE PRACTITIONER

## 2021-08-13 PROCEDURE — 99239 HOSP IP/OBS DSCHRG MGMT >30: CPT | Performed by: INTERNAL MEDICINE

## 2021-08-13 PROCEDURE — 25010000002 ONDANSETRON PER 1 MG: Performed by: PHYSICIAN ASSISTANT

## 2021-08-13 PROCEDURE — 80053 COMPREHEN METABOLIC PANEL: CPT | Performed by: NURSE PRACTITIONER

## 2021-08-13 RX ORDER — METRONIDAZOLE 500 MG/1
500 TABLET ORAL 3 TIMES DAILY
Qty: 42 TABLET | Refills: 1 | Status: SHIPPED | OUTPATIENT
Start: 2021-08-13 | End: 2021-08-27

## 2021-08-13 RX ORDER — LEVOFLOXACIN 750 MG/1
750 TABLET ORAL DAILY
Qty: 14 TABLET | Refills: 0 | Status: SHIPPED | OUTPATIENT
Start: 2021-08-13 | End: 2021-08-27

## 2021-08-13 RX ORDER — POTASSIUM CHLORIDE 750 MG/1
10 TABLET, FILM COATED, EXTENDED RELEASE ORAL 2 TIMES DAILY
Qty: 28 TABLET | Refills: 0 | Status: SHIPPED | OUTPATIENT
Start: 2021-08-13 | End: 2021-08-27

## 2021-08-13 RX ORDER — ONDANSETRON 4 MG/1
4 TABLET, FILM COATED ORAL EVERY 6 HOURS PRN
Qty: 40 TABLET | Refills: 0 | Status: SHIPPED | OUTPATIENT
Start: 2021-08-13 | End: 2021-08-27

## 2021-08-13 RX ORDER — OXYCODONE HYDROCHLORIDE 5 MG/1
5 TABLET ORAL EVERY 4 HOURS PRN
Qty: 20 TABLET | Refills: 0 | Status: SHIPPED | OUTPATIENT
Start: 2021-08-13 | End: 2021-08-19

## 2021-08-13 RX ADMIN — Medication 10 ML: at 09:43

## 2021-08-13 RX ADMIN — OXYCODONE 10 MG: 5 TABLET ORAL at 11:56

## 2021-08-13 RX ADMIN — ACETAMINOPHEN 1000 MG: 500 TABLET, FILM COATED ORAL at 01:13

## 2021-08-13 RX ADMIN — ONDANSETRON 4 MG: 2 INJECTION INTRAMUSCULAR; INTRAVENOUS at 09:52

## 2021-08-13 RX ADMIN — HYDROMORPHONE HYDROCHLORIDE 0.5 MG: 2 INJECTION, SOLUTION INTRAMUSCULAR; INTRAVENOUS; SUBCUTANEOUS at 09:41

## 2021-08-13 RX ADMIN — PIPERACILLIN AND TAZOBACTAM 3.38 G: 3; .375 INJECTION, POWDER, LYOPHILIZED, FOR SOLUTION INTRAVENOUS at 09:42

## 2021-08-13 RX ADMIN — HYDROMORPHONE HYDROCHLORIDE 0.5 MG: 2 INJECTION, SOLUTION INTRAMUSCULAR; INTRAVENOUS; SUBCUTANEOUS at 05:16

## 2021-08-13 RX ADMIN — POTASSIUM CHLORIDE 40 MEQ: 1500 TABLET, EXTENDED RELEASE ORAL at 09:43

## 2021-08-13 RX ADMIN — ACETAMINOPHEN 1000 MG: 500 TABLET, FILM COATED ORAL at 09:40

## 2021-08-13 RX ADMIN — PIPERACILLIN AND TAZOBACTAM 3.38 G: 3; .375 INJECTION, POWDER, LYOPHILIZED, FOR SOLUTION INTRAVENOUS at 00:02

## 2021-08-13 RX ADMIN — HYDROMORPHONE HYDROCHLORIDE 0.5 MG: 2 INJECTION, SOLUTION INTRAMUSCULAR; INTRAVENOUS; SUBCUTANEOUS at 00:07

## 2021-08-13 NOTE — PROGRESS NOTES
Nutrition Services    Patient Name: Carin Price  YOB: 1995  MRN: [id number]  Admission date: 8/10/2021    PPE Documentation        PPE Worn By Provider mask and eye protection   PPE Worn By Patient  none     NUTRITION SCREENING      Encounter Information: 8/13: Met with patient in room for MST score of 3. Patient reports a decreased po intake over the last 3 months with diagnosis of Crohn's disease. Diet education handouts provided to patient and reviewed with contact info for RD provided. Patient currently with active d/c orders with plans for discharge today.        PO Diet: Diet Regular   PO Supplements: -    PO Intake:  -%, choosing full meals for most meals       Labs (reviewed below): -K low, BUN low        GI Function:  -BM 8/12       Skin: -no breakdown       Weight Hx Review: -UBW 240lb, current weight 229lb, decrease of 4.5% x3 months       Nutrition Intervention: Continue Regular diet as tolerated.    Crohn's diet education handouts with instruction by RD on 8/13.       Results from last 7 days   Lab Units 08/13/21  0436 08/12/21  0724 08/11/21  1239 08/11/21  0025 08/11/21  0025   SODIUM mmol/L 139 135* 139   < > 152*   POTASSIUM mmol/L 3.1* 3.3* 3.4*   < > 3.9   CHLORIDE mmol/L 104 99 106   < > 113*   CO2 mmol/L 27.0 26.0 23.0   < > 20.0*   BUN mg/dL 3* 3* 5*   < > 4*   CREATININE mg/dL 0.74 0.83 0.80   < > 0.64   CALCIUM mg/dL 8.3* 8.8 8.5*   < > 8.6   BILIRUBIN mg/dL 0.3 0.4  --   --  0.3   ALK PHOS U/L 102 125*  --   --  129*   ALT (SGPT) U/L 15 23  --   --  31   AST (SGOT) U/L 10 15  --   --  21   GLUCOSE mg/dL 79 86 77   < > 98    < > = values in this interval not displayed.     Results from last 7 days   Lab Units 08/13/21  0436 08/12/21  0724 08/12/21  0724 08/11/21  0628 08/11/21  0025   MAGNESIUM mg/dL 2.0  --  2.0  --  2.1   HEMOGLOBIN g/dL 10.2*   < > 11.0*   < >  --    HEMATOCRIT % 31.1*   < > 34.0   < >  --     < > = values in this interval not displayed.     COVID19    Date Value Ref Range Status   08/11/2021 Not Detected Not Detected - Ref. Range Final     No results found for: HGBA1C    RD to follow up per protocol.    Electronically signed by:  Hayley Ruiz RD  08/13/21 13:07 EDT

## 2021-08-13 NOTE — PLAN OF CARE
Goal Outcome Evaluation:  Plan of Care Reviewed With: patient        Progress: improving  Outcome Summary: Patient has rested well during the night.  Still with c/o abd pain requiring IV pain medication. Will continue to monitor

## 2021-08-13 NOTE — DISCHARGE INSTRUCTIONS
Follow-up with GI in 2 weeks  May repeat course of levofloxacin and Flagyl after initial 2 weeks.  To be decided/determined by GI service  Avoid lifting more than 5 pounds  Avoid strenuous exercise for risk of tendon rupture.  Monitor for hypoglycemia and treat as needed  Contraception if needed defer to primary care physician    follow-up with primary care physician next week to repeat labs CBC and CMP mag and phosphorus

## 2021-08-13 NOTE — PROGRESS NOTES
" LOS: 2 days   Patient Care Team:  Provider, No Known as PCP - General      Subjective \" I am doing better\"    Interval History:     Subjective: Some abdominal pain.  No nausea or vomiting and tolerating a diet.  No fevers overnight.      ROS:   No chest pain, shortness of breath, or cough.        Medication Review:     Current Facility-Administered Medications:   •  acetaminophen (TYLENOL) tablet 1,000 mg, 1,000 mg, Oral, Q8H, Audie Laureano MD, 1,000 mg at 08/13/21 0940  •  aluminum-magnesium hydroxide-simethicone (MAALOX MAX) 400-400-40 MG/5ML suspension 15 mL, 15 mL, Oral, Q6H PRN, Bernard Gilman PA  •  dextrose 5 % and sodium chloride 0.45 % with KCl 20 mEq/L infusion, 50 mL/hr, Intravenous, Continuous, Audie Laureano MD, Last Rate: 50 mL/hr at 08/12/21 1614, 50 mL/hr at 08/12/21 1614  •  enoxaparin (LOVENOX) syringe 40 mg, 40 mg, Subcutaneous, Nightly, Audie Laureano MD, 40 mg at 08/12/21 1610  •  HYDROmorphone (DILAUDID) injection 0.5 mg, 0.5 mg, Intravenous, Q4H PRN, Audie Laureano MD, 0.5 mg at 08/13/21 0941  •  Magnesium Sulfate 2 gram infusion - Mg less than or equal to 1.5 mg/dL, 2 g, Intravenous, PRN **OR** Magnesium Sulfate 1 gram infusion - Mg 1.6-1.9 mg/dL, 1 g, Intravenous, PRN, Bernard Gilman PA  •  nitroglycerin (NITROSTAT) SL tablet 0.4 mg, 0.4 mg, Sublingual, Q5 Min PRN, Bernard Gilman PA  •  ondansetron (ZOFRAN) tablet 4 mg, 4 mg, Oral, Q6H PRN **OR** ondansetron (ZOFRAN) injection 4 mg, 4 mg, Intravenous, Q6H PRN, Bernard Gilman PA, 4 mg at 08/12/21 1800  •  oxyCODONE (ROXICODONE) immediate release tablet 10 mg, 10 mg, Oral, Q4H PRN, Audie Laureano MD, 10 mg at 08/12/21 1611  •  oxyCODONE (ROXICODONE) immediate release tablet 5 mg, 5 mg, Oral, Q4H PRN, Audie Laureano MD  •  piperacillin-tazobactam (ZOSYN) IVPB 3.375 g in 100 mL NS (CD), 3.375 g, Intravenous, Q8H, Bernard Gilman, PA, 3.375 g at 08/13/21 0942  •  potassium chloride (K-DUR,KLOR-CON) CR tablet 40 mEq, 40 mEq, Oral, PRN, Bernard Gilman, " PA, 40 mEq at 08/13/21 0943  •  sodium chloride 0.9 % flush 10 mL, 10 mL, Intravenous, Q12H, Bernard Gilman PA, 10 mL at 08/13/21 0943  •  sodium chloride 0.9 % flush 10 mL, 10 mL, Intravenous, PRN, Bernard Gilman PA  •  zolpidem (AMBIEN) tablet 5 mg, 5 mg, Oral, Nightly PRN, Good Rivas MD, 5 mg at 08/12/21 2132      Objective Resting in bed, no acute distress, family in room    Vital Signs  Vitals:    08/12/21 0700 08/12/21 1312 08/12/21 1941 08/13/21 0312   BP: 109/69 118/78 106/68 109/67   BP Location:  Left arm Left arm Left arm   Patient Position:  Lying Lying Lying   Pulse:  79 74 99   Resp:  16 17 19   Temp:  98.6 °F (37 °C) 98.1 °F (36.7 °C) 98.5 °F (36.9 °C)   TempSrc:  Oral Oral Oral   SpO2:  99% 95% 96%   Weight:    104 kg (229 lb 4.5 oz)   Height:           Physical Exam:     General Appearance:    Awake and alert, in no acute distress, obese   Head:    Normocephalic, without obvious abnormality   Eyes:          Conjunctivae normal, anicteric sclera   Throat:   No oral lesions, no thrush, oral mucosa moist   Neck:    supple, no JVD   Lungs:     respirations regular, even and unlabored   Abdomen:     Soft, tenderness without rebound or guarding, nondistended   Rectal:     Deferred   Extremities:   No edema, no cyanosis   Skin:   No bruising or rash, no jaundice        Results Review:    CBC    Results from last 7 days   Lab Units 08/13/21  0436 08/12/21  0724 08/11/21  0628   WBC 10*3/mm3 10.70 11.10* 10.60   HEMOGLOBIN g/dL 10.2* 11.0* 10.8*   PLATELETS 10*3/mm3 616* 724* 666*     CMP   Results from last 7 days   Lab Units 08/13/21  0436 08/12/21  0724 08/11/21  1239 08/11/21  0025   SODIUM mmol/L 139 135* 139 152*   POTASSIUM mmol/L 3.1* 3.3* 3.4* 3.9   CHLORIDE mmol/L 104 99 106 113*   CO2 mmol/L 27.0 26.0 23.0 20.0*   BUN mg/dL 3* 3* 5* 4*   CREATININE mg/dL 0.74 0.83 0.80 0.64   GLUCOSE mg/dL 79 86 77 98   ALBUMIN g/dL 2.80* 3.20*  --  3.10*   BILIRUBIN mg/dL 0.3 0.4  --  0.3   ALK PHOS U/L 102 125*  --   129*   AST (SGOT) U/L 10 15  --  21   ALT (SGPT) U/L 15 23  --  31   MAGNESIUM mg/dL 2.0 2.0  --  2.1   LIPASE U/L  --   --   --  7*     Cr Clearance Estimated Creatinine Clearance: 144.2 mL/min (by C-G formula based on SCr of 0.74 mg/dL).  Coag     HbA1C No results found for: HGBA1C  Blood Glucose No results found for: POCGLU  Infection   Results from last 7 days   Lab Units 08/11/21  0314 08/11/21  0026   BLOODCX   --  No growth at 2 days  No growth at 2 days   URINECX  No growth  --      UA    Results from last 7 days   Lab Units 08/11/21  0314   NITRITE UA  Negative   WBC UA /HPF 6-12*   BACTERIA UA /HPF None Seen   SQUAM EPITHEL UA /HPF 3-6*   URINECX  No growth     Radiology(recent) No radiology results for the last day       Assessment/Plan   Abdominal pain with diarrhea  Recently diagnosed Crohn's disease in May 2021  Abnormal CT suggesting ileocolic complex process with fistulizing Crohn's disease and presumed abscess  Microcytic anemia  Elevated alk phos  History of cholecystectomy     Plan:  Continues to improve and is tolerating diet.  Okay to discharge home on oral antibiotic course for the next month, consider levofloxacin and metronidazole.  Diet as tolerated.  We will plan outpatient repeat CT in 1 month to further evaluate presumed abscess.  Once acute issue resolved, will need more aggressive therapy for her underlying Crohn's disease.  Discussed in detail at bedside with patient and family.  She can follow-up with GI as outpatient in 2 to 4 weeks.  We will see inpatient as needed, call questions or concerns.    Electronically signed by GASTON Rousseau, 08/13/21, 9:44 AM EDT.

## 2021-08-13 NOTE — DISCHARGE SUMMARY
[facility] Medicine Services  DISCHARGE SUMMARY    Patient Name: Carin Price  : 1995  MRN: [id number]    Date of Admission: 8/10/2021  Date of Discharge: 2021  Condition at discharge stable  Primary Care Physician: Provider, No Known      Presenting Problem:   Exacerbation of Crohn's disease (CMS/HCC) [K50.90]    Active and Resolved Hospital Problems:  Active Hospital Problems    Diagnosis POA   • Exacerbation of Crohn's disease (CMS/HCC) [K50.90] Yes   • Hematuria [R31.9] Yes   • Transaminitis [R74.01] Yes   • Microcytic hypochromic anemia [D50.9] Yes   • Thrombocytosis (CMS/HCC) [D47.3] Yes   • Inflammatory bowel disease (Crohn's disease) (CMS/HCC) [K50.90] Yes      Resolved Hospital Problems    Diagnosis POA   • **Crohn's disease of colon with abscess (CMS/HCC) [K50.114] Yes         Hospital Course     Hospital Course:  Carin Price is a 25 y.o. female *  History of Present Illness: Carin Price is a 25 y.o. female who presented to [facility] on 8/10/2021 complaining of abdominal pain for the past 4 days.  Patient states abdominal pain is about a 6 or 7 out of 10 that is somewhat diffuse in nature.  Patient states the pain is nonradiating.  Patient reports nothing seems to make the pain better or worse.  Patient reports some nausea but no vomiting.  Patient reports about 4 days of diarrhea but denies any blood or melena.  Patient denies any urinary symptoms, fever, cough, chest pain, shortness of breath or swelling in her legs bilaterally.  Of note, patient states she just started her menstrual cycle yesterday.  Patient reports that she has a history of Crohn's and this felt like a similar exacerbation to the past however the pain was not getting any better so she went to the ER at [facility].     In the ER [facility], CT abdomen pelvis with contrast shows complex process in the ileocolic mesentery probably representing  fistulizing Crohn's with presumed abscess.  Worse since May 10, 2021.  UA showed trace blood but otherwise unremarkable.  Urine hCG negative.  CMP was remarkable for AST and ALT slightly elevated at 47 and 56 respectively as well as alk phos elevated at 177.  Lactic acid normal.  Lipase normal.  White blood cell count elevated 15.5, hemoglobin of 11.7, low MCV and low MCH.  Platelets elevated at 769 what appears to be chronic per ED provider.  Patient was given Dilaudid, morphine, Zofran and Zosyn in the ED.         patient Reports  8/11  Patient reports generalized abdominal discomfort and diarrhea has resolved  Patient seen and examined earlier today  She had no further diarrhea after admission  We are waiting on her stool studies  She was seen by general surgeon who called me and recommended no surgical intervention is required at this time and ask for GI consultation  GI consulted for further assistance with her care     8/12  Patient's still complaining of right lower quadrant abdominal pain  She is started to have diarrhea again today and stool studies are currently pending.  She denies vomiting  Stool studies are negative    8/ 13  Patient is much better today and released for discharge by GI service.  GI service requesting treatment with antibiotics for 1 month.  Will initiate 2 weeks of Levaquin and Flagyl to be reinstated or continue for additional 2 weeks if tolerating well without complications.  Side effects of Levaquin discussed with patient and family and instructions to avoid strenuous exercise or heavy lifting above 5 pounds is discussed.  Patient potassium level is low and will need replacement  Patient will need follow-up with wound care physician for repeat labs in the next week  Of note patient had negative stool studies for C. difficile and GI panel  She had negative Covid testing    She was seen by general surgeon and no need for surgery at this time but patient will need repeat CT scan in 2  weeks        DISCHARGE Follow Up Recommendations for labs and diagnostics:     Follow-up with GI in 2 weeks  May repeat course of levofloxacin and Flagyl after initial 2 weeks.  To be decided/determined by GI service  Avoid lifting more than 5 pounds  Avoid strenuous exercise for risk of tendon rupture.  Monitor for hypoglycemia and treat as needed  Contraception if needed defer to primary care physician   follow-up with primary care physician next week to repeat labs  Repeat CT scan in 2 weeks with GI service.      Reasons For Change In Medications and Indications for New Medications:  As outlined above  Patient provided pain medication and antiemetics as well    Day of Discharge     Vital Signs:  Temp:  [98.1 °F (36.7 °C)-98.6 °F (37 °C)] 98.5 °F (36.9 °C)  Heart Rate:  [74-99] 99  Resp:  [16-19] 19  BP: (106-118)/(67-78) 109/67    Physical Exam:  Physical Exam *  Improved abdominal tenderness  Overall looking well without distress        Pertinent  and/or Most Recent Results     LAB RESULTS:      Lab 08/13/21 0436 08/12/21 0724 08/11/21  0628 08/11/21  0025   WBC 10.70 11.10* 10.60  --    HEMOGLOBIN 10.2* 11.0* 10.8*  --    HEMATOCRIT 31.1* 34.0 32.9*  --    PLATELETS 616* 724* 666*  --    NEUTROS ABS 7.10* 7.20* 7.10*  --    LYMPHS ABS 2.30 2.70 2.30  --    MONOS ABS 1.00* 0.90 1.00*  --    EOS ABS 0.20 0.20 0.10  --    MCV 72.3* 72.1* 72.9*  --    SED RATE  --   --   --  101*   CRP  --   --   --  21.03*   LACTATE  --   --   --  1.0         Lab 08/13/21  0436 08/12/21  0724 08/11/21  1239 08/11/21  0025   SODIUM 139 135* 139 152*   POTASSIUM 3.1* 3.3* 3.4* 3.9   CHLORIDE 104 99 106 113*   CO2 27.0 26.0 23.0 20.0*   ANION GAP 8.0 10.0 10.0 19.0*   BUN 3* 3* 5* 4*   CREATININE 0.74 0.83 0.80 0.64   GLUCOSE 79 86 77 98   CALCIUM 8.3* 8.8 8.5* 8.6   MAGNESIUM 2.0 2.0  --  2.1         Lab 08/13/21  0436 08/12/21  0724 08/11/21  0025   TOTAL PROTEIN 6.7 7.4 7.4   ALBUMIN 2.80* 3.20* 3.10*   GLOBULIN 3.9 4.2 4.3    ALT (SGPT) 15 23 31   AST (SGOT) 10 15 21   BILIRUBIN 0.3 0.4 0.3   ALK PHOS 102 125* 129*   LIPASE  --   --  7*                 Lab 08/11/21  0026   ABO TYPING A   RH TYPING Negative   ANTIBODY SCREEN Negative         Brief Urine Lab Results  (Last result in the past 365 days)      Color   Clarity   Blood   Leuk Est   Nitrite   Protein   CREAT   Urine HCG        08/11/21 0314 Yellow Clear Large (3+) Negative Negative Negative         08/11/21 0314               Negative         Microbiology Results (last 10 days)     Procedure Component Value - Date/Time    Gastrointestinal Panel, PCR - Stool, Per Rectum [[id number]]  (Normal) Collected: 08/12/21 0730    Lab Status: Final result Specimen: Stool from Per Rectum Updated: 08/12/21 0919     Campylobacter Not Detected     Plesiomonas shigelloides Not Detected     Salmonella Not Detected     Vibrio Not Detected     Vibrio cholerae Not Detected     Yersinia enterocolitica Not Detected     Enteroaggregative E. coli (EAEC) Not Detected     Enteropathogenic E. coli (EPEC) Not Detected     Enterotoxigenic E. coli (ETEC) lt/st Not Detected     Shiga-like toxin-producing E. coli (STEC) stx1/stx2 Not Detected     Shigella/Enteroinvasive E. coli (EIEC) Not Detected     Cryptosporidium Not Detected     Cyclospora cayetanensis Not Detected     Entamoeba histolytica Not Detected     Giardia lamblia Not Detected     Adenovirus F40/41 Not Detected     Astrovirus Not Detected     Norovirus GI/GII Not Detected     Rotavirus A Not Detected     Sapovirus (I, II, IV or V) Not Detected    Narrative:      If Aeromonas, Staphylococcus aureus or Bacillus cereus are suspected, please order [id number]: Stool Culture, Aeromonas, S aureus, B Cereus.    Clostridium Difficile Toxin - Stool, Per Rectum [[id number]]  (Normal) Collected: 08/12/21 0730    Lab Status: Final result Specimen: Stool from Per Rectum Updated: 08/12/21 0834    Narrative:      The following orders were created for panel order  Clostridium Difficile Toxin - Stool, Per Rectum.  Procedure                               Abnormality         Status                     ---------                               -----------         ------                     Clostridium Difficile EI...[[id number]]  Normal              Final result                 Please view results for these tests on the individual orders.    Clostridium Difficile EIA - Stool, Per Rectum [[id number]]  (Normal) Collected: 08/12/21 0730    Lab Status: Final result Specimen: Stool from Per Rectum Updated: 08/12/21 0834     C Diff GDH / Toxin Negative    COVID-19,CEPHEID/TEMITOPE/BDMAX,COR/MICHAEL/PAD/MICHELLE IN-HOUSE(OR EMERGENT/ADD-ON),NP SWAB IN TRANSPORT MEDIA 3-4 HR TAT, RT-PCR - Swab, Nasopharynx [[id number]]  (Normal) Collected: 08/11/21 0432    Lab Status: Final result Specimen: Swab from Nasopharynx Updated: 08/11/21 0706     COVID19 Not Detected    Narrative:      Fact sheet for providers: https://www.fda.gov/media/885814/download     Fact sheet for patients: https://www.fda.gov/media/588117/download  Fact sheet for providers: https://www.fda.gov/media/945670/download     Fact sheet for patients: https://www.fda.gov/media/679514/download    Urine Culture - Urine, Urine, Clean Catch [[id number]]  (Normal) Collected: 08/11/21 0314    Lab Status: Final result Specimen: Urine, Clean Catch Updated: 08/12/21 1033     Urine Culture No growth    Blood Culture - Blood, Arm, Left [[id number]] Collected: 08/11/21 0026    Lab Status: Preliminary result Specimen: Blood from Arm, Left Updated: 08/13/21 0115     Blood Culture No growth at 2 days    Blood Culture - Blood, Arm, Left [[id number]] Collected: 08/11/21 0026    Lab Status: Preliminary result Specimen: Blood from Arm, Left Updated: 08/13/21 0115     Blood Culture No growth at 2 days                           Labs Pending at Discharge:  Pending Labs     Order Current Status    Blood Culture - Blood, Arm, Left Preliminary result    Blood Culture -  Blood, Arm, Left Preliminary result          Procedures Performed           Consults:   Consults     Date and Time Order Name Status Description    8/11/2021  9:07 AM Inpatient Gastroenterology Consult      8/11/2021 12:03 AM Inpatient General Surgery Consult Completed             Discharge Details        Discharge Medications      New Medications      Instructions Start Date   levoFLOXacin 750 MG tablet  Commonly known as: Levaquin   750 mg, Oral, Daily      metroNIDAZOLE 500 MG tablet  Commonly known as: Flagyl   500 mg, Oral, 3 Times Daily      ondansetron 4 MG tablet  Commonly known as: ZOFRAN   4 mg, Oral, Every 6 Hours PRN      oxyCODONE 5 MG immediate release tablet  Commonly known as: ROXICODONE   5 mg, Oral, Every 4 Hours PRN      potassium chloride 10 MEQ CR tablet   10 mEq, Oral, 2 Times Daily         Continue These Medications      Instructions Start Date   ferrous sulfate 325 (65 FE) MG tablet   325 mg, Oral, Daily With Breakfast         Stop These Medications    Budesonide 3 MG 24 hr capsule  Commonly known as: ENTOCORT EC            No Known Allergies      Discharge Disposition:  Home or Self Care    Diet:  Hospital:  Diet Order   Procedures   • Diet Regular         Discharge Activity:   As outlined above      CODE STATUS:  Code Status and Medical Interventions:   Ordered at: 08/11/21 0003     Code Status:    CPR     Medical Interventions (Level of Support Prior to Arrest):    Full         No future appointments.  As above      Time spent on Discharge including face to face service:  *33 minutes    This patient has been examined wearing appropriate Personal Protective Equipment and discussed with hospital infection control department. 08/13/21      Signature: Electronically signed by Mo Estrella MD, 08/13/21, 11:32 AM EDT.  [facility] Hospitalist Team

## 2021-08-16 LAB
BACTERIA SPEC AEROBE CULT: NORMAL
BACTERIA SPEC AEROBE CULT: NORMAL

## 2021-08-25 ENCOUNTER — OFFICE (OUTPATIENT)
Dept: URBAN - METROPOLITAN AREA CLINIC 64 | Facility: CLINIC | Age: 26
End: 2021-08-25

## 2021-08-25 VITALS
WEIGHT: 226 LBS | HEART RATE: 63 BPM | SYSTOLIC BLOOD PRESSURE: 147 MMHG | HEIGHT: 67 IN | DIASTOLIC BLOOD PRESSURE: 89 MMHG

## 2021-08-25 DIAGNOSIS — R10.31 RIGHT LOWER QUADRANT PAIN: ICD-10-CM

## 2021-08-25 DIAGNOSIS — K50.114 CROHN'S DISEASE OF LARGE INTESTINE WITH ABSCESS: ICD-10-CM

## 2021-08-25 PROCEDURE — 99213 OFFICE O/P EST LOW 20 MIN: CPT | Performed by: NURSE PRACTITIONER

## 2021-11-02 ENCOUNTER — OFFICE (OUTPATIENT)
Dept: URBAN - METROPOLITAN AREA CLINIC 64 | Facility: CLINIC | Age: 26
End: 2021-11-02

## 2021-11-02 VITALS
WEIGHT: 233 LBS | HEIGHT: 67 IN | HEART RATE: 72 BPM | SYSTOLIC BLOOD PRESSURE: 122 MMHG | DIASTOLIC BLOOD PRESSURE: 81 MMHG

## 2021-11-02 DIAGNOSIS — K50.114 CROHN'S DISEASE OF LARGE INTESTINE WITH ABSCESS: ICD-10-CM

## 2021-11-02 DIAGNOSIS — R10.31 RIGHT LOWER QUADRANT PAIN: ICD-10-CM

## 2021-11-02 PROCEDURE — 99214 OFFICE O/P EST MOD 30 MIN: CPT | Performed by: INTERNAL MEDICINE

## 2022-02-01 ENCOUNTER — OFFICE (OUTPATIENT)
Dept: URBAN - METROPOLITAN AREA CLINIC 64 | Facility: CLINIC | Age: 27
End: 2022-02-01

## 2022-02-01 VITALS
DIASTOLIC BLOOD PRESSURE: 73 MMHG | WEIGHT: 231 LBS | SYSTOLIC BLOOD PRESSURE: 108 MMHG | HEIGHT: 67 IN | HEART RATE: 84 BPM

## 2022-02-01 DIAGNOSIS — K50.114 CROHN'S DISEASE OF LARGE INTESTINE WITH ABSCESS: ICD-10-CM

## 2022-02-01 PROCEDURE — 99214 OFFICE O/P EST MOD 30 MIN: CPT | Performed by: INTERNAL MEDICINE

## 2022-02-01 RX ORDER — SACCHAROMYCES BOULARDII 50 MG
500 CAPSULE ORAL
Qty: 60 | Refills: 1 | Status: COMPLETED
Start: 2022-02-01 | End: 2022-08-09

## 2022-02-22 ENCOUNTER — OFFICE (OUTPATIENT)
Dept: URBAN - METROPOLITAN AREA CLINIC 64 | Facility: CLINIC | Age: 27
End: 2022-02-22

## 2022-02-22 VITALS
SYSTOLIC BLOOD PRESSURE: 121 MMHG | DIASTOLIC BLOOD PRESSURE: 76 MMHG | WEIGHT: 233 LBS | HEIGHT: 67 IN | HEART RATE: 90 BPM

## 2022-02-22 DIAGNOSIS — K50.114 CROHN'S DISEASE OF LARGE INTESTINE WITH ABSCESS: ICD-10-CM

## 2022-02-22 PROCEDURE — 99212 OFFICE O/P EST SF 10 MIN: CPT | Performed by: NURSE PRACTITIONER

## 2022-05-05 ENCOUNTER — OFFICE (OUTPATIENT)
Dept: URBAN - METROPOLITAN AREA CLINIC 64 | Facility: CLINIC | Age: 27
End: 2022-05-05
Payer: COMMERCIAL

## 2022-05-05 VITALS
DIASTOLIC BLOOD PRESSURE: 65 MMHG | HEART RATE: 88 BPM | WEIGHT: 241 LBS | SYSTOLIC BLOOD PRESSURE: 124 MMHG | HEIGHT: 67 IN

## 2022-05-05 DIAGNOSIS — K50.114 CROHN'S DISEASE OF LARGE INTESTINE WITH ABSCESS: ICD-10-CM

## 2022-05-05 PROCEDURE — 99214 OFFICE O/P EST MOD 30 MIN: CPT | Performed by: INTERNAL MEDICINE

## 2022-08-09 ENCOUNTER — OFFICE (OUTPATIENT)
Dept: URBAN - METROPOLITAN AREA CLINIC 64 | Facility: CLINIC | Age: 27
End: 2022-08-09
Payer: COMMERCIAL

## 2022-08-09 VITALS
HEART RATE: 72 BPM | HEIGHT: 67 IN | SYSTOLIC BLOOD PRESSURE: 125 MMHG | WEIGHT: 236 LBS | DIASTOLIC BLOOD PRESSURE: 81 MMHG

## 2022-08-09 DIAGNOSIS — K50.814 CROHN'S DISEASE OF BOTH SMALL AND LARGE INTESTINE WITH ABSCE: ICD-10-CM

## 2022-08-09 PROCEDURE — 99213 OFFICE O/P EST LOW 20 MIN: CPT | Performed by: INTERNAL MEDICINE

## 2022-11-22 ENCOUNTER — OFFICE (OUTPATIENT)
Dept: URBAN - METROPOLITAN AREA CLINIC 64 | Facility: CLINIC | Age: 27
End: 2022-11-22
Payer: COMMERCIAL

## 2022-11-22 VITALS
HEIGHT: 67 IN | SYSTOLIC BLOOD PRESSURE: 125 MMHG | WEIGHT: 251 LBS | DIASTOLIC BLOOD PRESSURE: 87 MMHG | HEART RATE: 81 BPM

## 2022-11-22 DIAGNOSIS — K50.814 CROHN'S DISEASE OF BOTH SMALL AND LARGE INTESTINE WITH ABSCE: ICD-10-CM

## 2022-11-22 PROCEDURE — 99213 OFFICE O/P EST LOW 20 MIN: CPT | Performed by: INTERNAL MEDICINE

## 2023-05-30 ENCOUNTER — OFFICE (OUTPATIENT)
Dept: URBAN - METROPOLITAN AREA CLINIC 64 | Facility: CLINIC | Age: 28
End: 2023-05-30
Payer: COMMERCIAL

## 2023-05-30 VITALS
DIASTOLIC BLOOD PRESSURE: 86 MMHG | WEIGHT: 269 LBS | SYSTOLIC BLOOD PRESSURE: 126 MMHG | HEIGHT: 67 IN | HEART RATE: 76 BPM

## 2023-05-30 DIAGNOSIS — K50.814 CROHN'S DISEASE OF BOTH SMALL AND LARGE INTESTINE WITH ABSCE: ICD-10-CM

## 2023-05-30 PROCEDURE — 99213 OFFICE O/P EST LOW 20 MIN: CPT | Performed by: INTERNAL MEDICINE

## 2023-11-30 ENCOUNTER — OFFICE (OUTPATIENT)
Dept: URBAN - METROPOLITAN AREA CLINIC 64 | Facility: CLINIC | Age: 28
End: 2023-11-30
Payer: COMMERCIAL

## 2023-11-30 VITALS
HEART RATE: 72 BPM | SYSTOLIC BLOOD PRESSURE: 136 MMHG | HEIGHT: 67 IN | WEIGHT: 263 LBS | DIASTOLIC BLOOD PRESSURE: 87 MMHG

## 2023-11-30 DIAGNOSIS — K50.814 CROHN'S DISEASE OF BOTH SMALL AND LARGE INTESTINE WITH ABSCE: ICD-10-CM

## 2023-11-30 PROCEDURE — 99213 OFFICE O/P EST LOW 20 MIN: CPT | Performed by: INTERNAL MEDICINE

## 2024-03-08 ENCOUNTER — OFFICE (OUTPATIENT)
Dept: URBAN - METROPOLITAN AREA CLINIC 64 | Facility: CLINIC | Age: 29
End: 2024-03-08

## 2024-03-08 VITALS
WEIGHT: 259 LBS | DIASTOLIC BLOOD PRESSURE: 89 MMHG | HEIGHT: 67 IN | SYSTOLIC BLOOD PRESSURE: 127 MMHG | HEART RATE: 74 BPM

## 2024-03-08 DIAGNOSIS — K50.814 CROHN'S DISEASE OF BOTH SMALL AND LARGE INTESTINE WITH ABSCE: ICD-10-CM

## 2024-03-08 DIAGNOSIS — R19.7 DIARRHEA, UNSPECIFIED: ICD-10-CM

## 2024-03-08 PROCEDURE — 99214 OFFICE O/P EST MOD 30 MIN: CPT

## 2024-04-09 ENCOUNTER — OFFICE (OUTPATIENT)
Dept: URBAN - METROPOLITAN AREA CLINIC 64 | Facility: CLINIC | Age: 29
End: 2024-04-09

## 2024-04-09 VITALS
DIASTOLIC BLOOD PRESSURE: 90 MMHG | HEIGHT: 67 IN | WEIGHT: 250 LBS | SYSTOLIC BLOOD PRESSURE: 138 MMHG | HEART RATE: 58 BPM

## 2024-04-09 DIAGNOSIS — K50.814 CROHN'S DISEASE OF BOTH SMALL AND LARGE INTESTINE WITH ABSCE: ICD-10-CM

## 2024-04-09 DIAGNOSIS — R11.2 NAUSEA WITH VOMITING, UNSPECIFIED: ICD-10-CM

## 2024-04-09 PROCEDURE — 99214 OFFICE O/P EST MOD 30 MIN: CPT | Performed by: INTERNAL MEDICINE

## 2024-04-24 ENCOUNTER — HOSPITAL ENCOUNTER (INPATIENT)
Facility: HOSPITAL | Age: 29
LOS: 4 days | Discharge: HOME | DRG: 386 | End: 2024-04-29
Attending: EMERGENCY MEDICINE | Admitting: HOSPITALIST
Payer: COMMERCIAL

## 2024-04-24 ENCOUNTER — APPOINTMENT (OUTPATIENT)
Dept: CT IMAGING | Facility: HOSPITAL | Age: 29
DRG: 386 | End: 2024-04-24
Payer: COMMERCIAL

## 2024-04-24 DIAGNOSIS — R10.9 ABDOMINAL PAIN, UNSPECIFIED ABDOMINAL LOCATION: ICD-10-CM

## 2024-04-24 DIAGNOSIS — K50.018 CROHN'S DISEASE OF ILEUM WITH OTHER COMPLICATION: Primary | ICD-10-CM

## 2024-04-24 DIAGNOSIS — K50.019 CROHN'S DISEASE OF SMALL INTESTINE WITH COMPLICATION: ICD-10-CM

## 2024-04-24 PROBLEM — K50.90 CROHN DISEASE: Status: ACTIVE | Noted: 2024-04-24

## 2024-04-24 LAB
ALBUMIN SERPL-MCNC: 4 G/DL (ref 3.5–5.2)
ALBUMIN/GLOB SERPL: 1.1 G/DL
ALP SERPL-CCNC: 92 U/L (ref 39–117)
ALT SERPL W P-5'-P-CCNC: 77 U/L (ref 1–33)
ANION GAP SERPL CALCULATED.3IONS-SCNC: 10 MMOL/L (ref 5–15)
AST SERPL-CCNC: 24 U/L (ref 1–32)
B-HCG UR QL: NEGATIVE
BASOPHILS # BLD AUTO: 0.04 10*3/MM3 (ref 0–0.2)
BASOPHILS NFR BLD AUTO: 0.3 % (ref 0–1.5)
BILIRUB SERPL-MCNC: 0.3 MG/DL (ref 0–1.2)
BILIRUB UR QL STRIP: NEGATIVE
BUN SERPL-MCNC: 13 MG/DL (ref 6–20)
BUN/CREAT SERPL: 16.7 (ref 7–25)
CALCIUM SPEC-SCNC: 9.4 MG/DL (ref 8.6–10.5)
CHLORIDE SERPL-SCNC: 100 MMOL/L (ref 98–107)
CLARITY UR: CLEAR
CO2 SERPL-SCNC: 26 MMOL/L (ref 22–29)
COLOR UR: YELLOW
CREAT SERPL-MCNC: 0.78 MG/DL (ref 0.57–1)
D-LACTATE SERPL-SCNC: 0.9 MMOL/L (ref 0.3–2)
DEPRECATED RDW RBC AUTO: 43.1 FL (ref 37–54)
EGFRCR SERPLBLD CKD-EPI 2021: 106.3 ML/MIN/1.73
EOSINOPHIL # BLD AUTO: 0.09 10*3/MM3 (ref 0–0.4)
EOSINOPHIL NFR BLD AUTO: 0.7 % (ref 0.3–6.2)
ERYTHROCYTE [DISTWIDTH] IN BLOOD BY AUTOMATED COUNT: 13.4 % (ref 12.3–15.4)
GLOBULIN UR ELPH-MCNC: 3.7 GM/DL
GLUCOSE SERPL-MCNC: 83 MG/DL (ref 65–99)
GLUCOSE UR STRIP-MCNC: NEGATIVE MG/DL
HCT VFR BLD AUTO: 41.8 % (ref 34–46.6)
HGB BLD-MCNC: 13.4 G/DL (ref 12–15.9)
HGB UR QL STRIP.AUTO: NEGATIVE
IMM GRANULOCYTES # BLD AUTO: 0.04 10*3/MM3 (ref 0–0.05)
IMM GRANULOCYTES NFR BLD AUTO: 0.3 % (ref 0–0.5)
KETONES UR QL STRIP: ABNORMAL
LEUKOCYTE ESTERASE UR QL STRIP.AUTO: NEGATIVE
LIPASE SERPL-CCNC: 31 U/L (ref 13–60)
LYMPHOCYTES # BLD AUTO: 3.71 10*3/MM3 (ref 0.7–3.1)
LYMPHOCYTES NFR BLD AUTO: 27.1 % (ref 19.6–45.3)
MCH RBC QN AUTO: 27.9 PG (ref 26.6–33)
MCHC RBC AUTO-ENTMCNC: 32.1 G/DL (ref 31.5–35.7)
MCV RBC AUTO: 87.1 FL (ref 79–97)
MONOCYTES # BLD AUTO: 1.22 10*3/MM3 (ref 0.1–0.9)
MONOCYTES NFR BLD AUTO: 8.9 % (ref 5–12)
NEUTROPHILS NFR BLD AUTO: 62.7 % (ref 42.7–76)
NEUTROPHILS NFR BLD AUTO: 8.61 10*3/MM3 (ref 1.7–7)
NITRITE UR QL STRIP: NEGATIVE
NRBC BLD AUTO-RTO: 0 /100 WBC (ref 0–0.2)
PH UR STRIP.AUTO: 6.5 [PH] (ref 5–8)
PLATELET # BLD AUTO: 438 10*3/MM3 (ref 140–450)
PMV BLD AUTO: 8.9 FL (ref 6–12)
POTASSIUM SERPL-SCNC: 3.4 MMOL/L (ref 3.5–5.2)
PROT SERPL-MCNC: 7.7 G/DL (ref 6–8.5)
PROT UR QL STRIP: ABNORMAL
RBC # BLD AUTO: 4.8 10*6/MM3 (ref 3.77–5.28)
SODIUM SERPL-SCNC: 136 MMOL/L (ref 136–145)
SP GR UR STRIP: 1.03 (ref 1–1.03)
UROBILINOGEN UR QL STRIP: ABNORMAL
WBC NRBC COR # BLD AUTO: 13.71 10*3/MM3 (ref 3.4–10.8)

## 2024-04-24 PROCEDURE — 74177 CT ABD & PELVIS W/CONTRAST: CPT

## 2024-04-24 PROCEDURE — G0378 HOSPITAL OBSERVATION PER HR: HCPCS

## 2024-04-24 PROCEDURE — 25010000002 ONDANSETRON PER 1 MG: Performed by: EMERGENCY MEDICINE

## 2024-04-24 PROCEDURE — 81003 URINALYSIS AUTO W/O SCOPE: CPT | Performed by: EMERGENCY MEDICINE

## 2024-04-24 PROCEDURE — 25510000001 IOPAMIDOL PER 1 ML: Performed by: EMERGENCY MEDICINE

## 2024-04-24 PROCEDURE — 36415 COLL VENOUS BLD VENIPUNCTURE: CPT

## 2024-04-24 PROCEDURE — 83605 ASSAY OF LACTIC ACID: CPT

## 2024-04-24 PROCEDURE — 99285 EMERGENCY DEPT VISIT HI MDM: CPT

## 2024-04-24 PROCEDURE — 25010000002 PIPERACILLIN SOD-TAZOBACTAM PER 1 G: Performed by: EMERGENCY MEDICINE

## 2024-04-24 PROCEDURE — 25810000003 LACTATED RINGERS PER 1000 ML: Performed by: STUDENT IN AN ORGANIZED HEALTH CARE EDUCATION/TRAINING PROGRAM

## 2024-04-24 PROCEDURE — 83690 ASSAY OF LIPASE: CPT | Performed by: EMERGENCY MEDICINE

## 2024-04-24 PROCEDURE — 81025 URINE PREGNANCY TEST: CPT | Performed by: EMERGENCY MEDICINE

## 2024-04-24 PROCEDURE — 25010000002 HYDROMORPHONE 1 MG/ML SOLUTION: Performed by: EMERGENCY MEDICINE

## 2024-04-24 PROCEDURE — 25010000002 HYDROMORPHONE 1 MG/ML SOLUTION: Performed by: STUDENT IN AN ORGANIZED HEALTH CARE EDUCATION/TRAINING PROGRAM

## 2024-04-24 PROCEDURE — 87040 BLOOD CULTURE FOR BACTERIA: CPT | Performed by: EMERGENCY MEDICINE

## 2024-04-24 PROCEDURE — 80053 COMPREHEN METABOLIC PANEL: CPT | Performed by: EMERGENCY MEDICINE

## 2024-04-24 PROCEDURE — 25010000002 ONDANSETRON PER 1 MG: Performed by: STUDENT IN AN ORGANIZED HEALTH CARE EDUCATION/TRAINING PROGRAM

## 2024-04-24 PROCEDURE — 85025 COMPLETE CBC W/AUTO DIFF WBC: CPT | Performed by: EMERGENCY MEDICINE

## 2024-04-24 RX ORDER — PROCHLORPERAZINE EDISYLATE 5 MG/ML
10 INJECTION INTRAMUSCULAR; INTRAVENOUS EVERY 6 HOURS PRN
Status: DISCONTINUED | OUTPATIENT
Start: 2024-04-24 | End: 2024-04-29 | Stop reason: HOSPADM

## 2024-04-24 RX ORDER — SODIUM CHLORIDE 0.9 % (FLUSH) 0.9 %
10 SYRINGE (ML) INJECTION AS NEEDED
Status: DISCONTINUED | OUTPATIENT
Start: 2024-04-24 | End: 2024-04-29 | Stop reason: HOSPADM

## 2024-04-24 RX ORDER — POTASSIUM CHLORIDE 20 MEQ/1
40 TABLET, EXTENDED RELEASE ORAL EVERY 4 HOURS
Status: COMPLETED | OUTPATIENT
Start: 2024-04-25 | End: 2024-04-25

## 2024-04-24 RX ORDER — ONDANSETRON 4 MG/1
4 TABLET, ORALLY DISINTEGRATING ORAL EVERY 6 HOURS PRN
Status: DISCONTINUED | OUTPATIENT
Start: 2024-04-24 | End: 2024-04-29 | Stop reason: HOSPADM

## 2024-04-24 RX ORDER — ONDANSETRON 2 MG/ML
4 INJECTION INTRAMUSCULAR; INTRAVENOUS EVERY 6 HOURS PRN
Status: DISCONTINUED | OUTPATIENT
Start: 2024-04-24 | End: 2024-04-29 | Stop reason: HOSPADM

## 2024-04-24 RX ORDER — SODIUM CHLORIDE, SODIUM LACTATE, POTASSIUM CHLORIDE, CALCIUM CHLORIDE 600; 310; 30; 20 MG/100ML; MG/100ML; MG/100ML; MG/100ML
150 INJECTION, SOLUTION INTRAVENOUS CONTINUOUS
Status: DISCONTINUED | OUTPATIENT
Start: 2024-04-24 | End: 2024-04-29

## 2024-04-24 RX ORDER — NITROGLYCERIN 0.4 MG/1
0.4 TABLET SUBLINGUAL
Status: DISCONTINUED | OUTPATIENT
Start: 2024-04-24 | End: 2024-04-26

## 2024-04-24 RX ORDER — ONDANSETRON 2 MG/ML
4 INJECTION INTRAMUSCULAR; INTRAVENOUS ONCE
Status: COMPLETED | OUTPATIENT
Start: 2024-04-24 | End: 2024-04-24

## 2024-04-24 RX ORDER — SODIUM CHLORIDE 9 MG/ML
40 INJECTION, SOLUTION INTRAVENOUS AS NEEDED
Status: DISCONTINUED | OUTPATIENT
Start: 2024-04-24 | End: 2024-04-29 | Stop reason: HOSPADM

## 2024-04-24 RX ORDER — PREDNISONE 20 MG/1
10 TABLET ORAL DAILY
COMMUNITY
End: 2024-04-29 | Stop reason: HOSPADM

## 2024-04-24 RX ORDER — SODIUM CHLORIDE 0.9 % (FLUSH) 0.9 %
10 SYRINGE (ML) INJECTION EVERY 12 HOURS SCHEDULED
Status: DISCONTINUED | OUTPATIENT
Start: 2024-04-24 | End: 2024-04-29 | Stop reason: HOSPADM

## 2024-04-24 RX ADMIN — IOPAMIDOL 100 ML: 755 INJECTION, SOLUTION INTRAVENOUS at 19:43

## 2024-04-24 RX ADMIN — Medication 10 ML: at 23:54

## 2024-04-24 RX ADMIN — HYDROMORPHONE HYDROCHLORIDE 0.5 MG: 1 INJECTION, SOLUTION INTRAMUSCULAR; INTRAVENOUS; SUBCUTANEOUS at 22:48

## 2024-04-24 RX ADMIN — ONDANSETRON 4 MG: 2 INJECTION INTRAMUSCULAR; INTRAVENOUS at 18:22

## 2024-04-24 RX ADMIN — HYDROMORPHONE HYDROCHLORIDE 1 MG: 1 INJECTION, SOLUTION INTRAMUSCULAR; INTRAVENOUS; SUBCUTANEOUS at 19:44

## 2024-04-24 RX ADMIN — ONDANSETRON 4 MG: 2 INJECTION INTRAMUSCULAR; INTRAVENOUS at 22:42

## 2024-04-24 RX ADMIN — POTASSIUM CHLORIDE 40 MEQ: 1500 TABLET, EXTENDED RELEASE ORAL at 23:52

## 2024-04-24 RX ADMIN — HYDROMORPHONE HYDROCHLORIDE 1 MG: 1 INJECTION, SOLUTION INTRAMUSCULAR; INTRAVENOUS; SUBCUTANEOUS at 18:21

## 2024-04-24 RX ADMIN — PIPERACILLIN AND TAZOBACTAM 3.38 G: 3; .375 INJECTION, POWDER, FOR SOLUTION INTRAVENOUS at 21:44

## 2024-04-24 RX ADMIN — SODIUM CHLORIDE, POTASSIUM CHLORIDE, SODIUM LACTATE AND CALCIUM CHLORIDE 100 ML/HR: 600; 310; 30; 20 INJECTION, SOLUTION INTRAVENOUS at 23:52

## 2024-04-24 NOTE — Clinical Note
Level of Care: Med/Surg [1]   Admitting Physician: KRISTEN PUENTE [[id number]]   Attending Physician: KRISTEN PUENTE [[id number]]

## 2024-04-24 NOTE — ED PROVIDER NOTES
"Subjective   History of Present Illness  Chief complaint: Abdominal pain    28-year-old female presents with abdominal pain.  Patient has a history of Crohn's disease.  She states she has been having a flare of her Crohn's disease for the past couple of months.  Today she was at work and was lifting something up above her head when she had sudden sharp pain in her epigastric area that radiated down both sides of her abdomen.  She had an episode of nausea and vomiting.  She denies fever.  She states she called her gastroenterologist and they recommended she come to the emergency room.  She states pain has improved somewhat but she is still having pain when she walks.  Pain is better when she sits.    History provided by:  Patient      Review of Systems   Constitutional:  Negative for fever.   HENT:  Negative for congestion.    Respiratory:  Negative for cough and shortness of breath.    Cardiovascular:  Negative for chest pain.   Gastrointestinal:  Positive for abdominal pain, nausea and vomiting.   Genitourinary:  Negative for dysuria.   Musculoskeletal:  Negative for back pain.   Neurological:  Negative for headaches.       Past Medical History:   Diagnosis Date    Crohn's disease        No Known Allergies    Past Surgical History:   Procedure Laterality Date    LAPAROSCOPIC CHOLECYSTECTOMY         Family History   Problem Relation Age of Onset    No Known Problems Mother     No Known Problems Father        Social History     Socioeconomic History    Marital status:    Tobacco Use    Smoking status: Never   Vaping Use    Vaping status: Never Used   Substance and Sexual Activity    Alcohol use: Not Currently    Drug use: Never    Sexual activity: Defer       /80   Pulse 67   Temp 97.7 °F (36.5 °C) (Oral)   Resp 18   Ht 170.2 cm (67\")   Wt 113 kg (250 lb)   LMP 04/09/2024   SpO2 96%   BMI 39.16 kg/m²       Objective   Physical Exam  Vitals and nursing note reviewed.   Constitutional:       " Appearance: She is well-developed.   HENT:      Head: Normocephalic and atraumatic.      Mouth/Throat:      Mouth: Mucous membranes are moist.   Cardiovascular:      Rate and Rhythm: Normal rate and regular rhythm.      Heart sounds: Normal heart sounds.   Pulmonary:      Effort: Pulmonary effort is normal. No respiratory distress.      Breath sounds: Normal breath sounds.   Abdominal:      General: Bowel sounds are normal.      Palpations: Abdomen is soft.      Tenderness:  in the right upper quadrant, right lower quadrant, epigastric area, left upper quadrant and left lower quadrant There is no guarding or rebound.   Skin:     General: Skin is warm and dry.   Neurological:      Mental Status: She is alert and oriented to person, place, and time.         Procedures           ED Course      Results for orders placed or performed during the hospital encounter of 04/24/24   Comprehensive Metabolic Panel    Specimen: Blood   Result Value Ref Range    Glucose 83 65 - 99 mg/dL    BUN 13 6 - 20 mg/dL    Creatinine 0.78 0.57 - 1.00 mg/dL    Sodium 136 136 - 145 mmol/L    Potassium 3.4 (L) 3.5 - 5.2 mmol/L    Chloride 100 98 - 107 mmol/L    CO2 26.0 22.0 - 29.0 mmol/L    Calcium 9.4 8.6 - 10.5 mg/dL    Total Protein 7.7 6.0 - 8.5 g/dL    Albumin 4.0 3.5 - 5.2 g/dL    ALT (SGPT) 77 (H) 1 - 33 U/L    AST (SGOT) 24 1 - 32 U/L    Alkaline Phosphatase 92 39 - 117 U/L    Total Bilirubin 0.3 0.0 - 1.2 mg/dL    Globulin 3.7 gm/dL    A/G Ratio 1.1 g/dL    BUN/Creatinine Ratio 16.7 7.0 - 25.0    Anion Gap 10.0 5.0 - 15.0 mmol/L    eGFR 106.3 >60.0 mL/min/1.73   Lipase    Specimen: Blood   Result Value Ref Range    Lipase 31 13 - 60 U/L   Urinalysis With Microscopic If Indicated (No Culture) - Urine, Clean Catch    Specimen: Urine, Clean Catch   Result Value Ref Range    Color, UA Yellow Yellow, Straw    Appearance, UA Clear Clear    pH, UA 6.5 5.0 - 8.0    Specific Gravity, UA 1.031 (H) 1.005 - 1.030    Glucose, UA Negative Negative     Ketones, UA Trace (A) Negative    Bilirubin, UA Negative Negative    Blood, UA Negative Negative    Protein, UA Trace (A) Negative    Leuk Esterase, UA Negative Negative    Nitrite, UA Negative Negative    Urobilinogen, UA 1.0 E.U./dL 0.2 - 1.0 E.U./dL   Pregnancy, Urine - Urine, Clean Catch    Specimen: Urine, Clean Catch   Result Value Ref Range    HCG, Urine QL Negative Negative   CBC Auto Differential    Specimen: Blood   Result Value Ref Range    WBC 13.71 (H) 3.40 - 10.80 10*3/mm3    RBC 4.80 3.77 - 5.28 10*6/mm3    Hemoglobin 13.4 12.0 - 15.9 g/dL    Hematocrit 41.8 34.0 - 46.6 %    MCV 87.1 79.0 - 97.0 fL    MCH 27.9 26.6 - 33.0 pg    MCHC 32.1 31.5 - 35.7 g/dL    RDW 13.4 12.3 - 15.4 %    RDW-SD 43.1 37.0 - 54.0 fl    MPV 8.9 6.0 - 12.0 fL    Platelets 438 140 - 450 10*3/mm3    Neutrophil % 62.7 42.7 - 76.0 %    Lymphocyte % 27.1 19.6 - 45.3 %    Monocyte % 8.9 5.0 - 12.0 %    Eosinophil % 0.7 0.3 - 6.2 %    Basophil % 0.3 0.0 - 1.5 %    Immature Grans % 0.3 0.0 - 0.5 %    Neutrophils, Absolute 8.61 (H) 1.70 - 7.00 10*3/mm3    Lymphocytes, Absolute 3.71 (H) 0.70 - 3.10 10*3/mm3    Monocytes, Absolute 1.22 (H) 0.10 - 0.90 10*3/mm3    Eosinophils, Absolute 0.09 0.00 - 0.40 10*3/mm3    Basophils, Absolute 0.04 0.00 - 0.20 10*3/mm3    Immature Grans, Absolute 0.04 0.00 - 0.05 10*3/mm3    nRBC 0.0 0.0 - 0.2 /100 WBC   POC Lactate    Specimen: Blood   Result Value Ref Range    Lactate 0.9 0.3 - 2.0 mmol/L     CT Abdomen Pelvis With Contrast    Result Date: 4/24/2024  Impression: 1. CT findings consistent with active Crohn's flare centered at the terminal ileum. There is an inflammatory mass centered in the adjacent right lower quadrant mesentery with ill-defined tracts versus developing complex fistula and at least 1 tiny focus of extraluminal gas (coronal image 60 series 4). No drainable collection/abscess. No associated bowel obstruction. 2. 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 3. Hepatic steatosis. Electronically Signed: Kevin Quintana MD  4/24/2024 8:02 PM EDT  Workstation ID: [id number]                                          Medical Decision Making  Amount and/or Complexity of Data Reviewed  Labs: ordered.  Radiology: ordered.    Risk  Prescription drug management.      Patient had the above evaluation.  Results were discussed with patient.  White blood cell count is 13.7.  CMP is unremarkable.  Lipase is normal.  Urinalysis shows no UTI.  CT of the abdomen pelvis is showing evidence of active Crohn's flare is centered at the terminal ileum.  There is inflammatory mass centered in the adjacent right lower quadrant mesentery with ill-defined tracts versus developing complex fistula and at least 1 tiny focus of extraluminal gas.  There is no drainable abscess.  I discussed with gastroenterology who will consult on the patient.  I also discussed with colorectal surgery who will consult on the patient.  Patient was started on Zosyn.  I discussed with the hospitalist and patient will be admitted for further evaluation and management.      Final diagnoses:   Crohn's disease of ileum with other complication   Abdominal pain, unspecified abdominal location       ED Disposition  ED Disposition       ED Disposition   Decision to Admit    Condition   --    Comment   Level of Care: Med/Surg [1]   Admitting Physician: KRISTEN PUENTE [[id number]]   Attending Physician: KRISTEN PUENTE [[id number]]                 No follow-up provider specified.       Medication List      No changes were made to your prescriptions during this visit.            Mason Chowdhury MD  04/24/24 6877

## 2024-04-25 ENCOUNTER — INPATIENT HOSPITAL (OUTPATIENT)
Dept: URBAN - METROPOLITAN AREA HOSPITAL 84 | Facility: HOSPITAL | Age: 29
End: 2024-04-25

## 2024-04-25 DIAGNOSIS — K76.0 FATTY (CHANGE OF) LIVER, NOT ELSEWHERE CLASSIFIED: ICD-10-CM

## 2024-04-25 DIAGNOSIS — R10.9 UNSPECIFIED ABDOMINAL PAIN: ICD-10-CM

## 2024-04-25 DIAGNOSIS — D72.829 ELEVATED WHITE BLOOD CELL COUNT, UNSPECIFIED: ICD-10-CM

## 2024-04-25 DIAGNOSIS — R11.2 NAUSEA WITH VOMITING, UNSPECIFIED: ICD-10-CM

## 2024-04-25 DIAGNOSIS — K50.013 CROHN'S DISEASE OF SMALL INTESTINE WITH FISTULA: ICD-10-CM

## 2024-04-25 LAB
ALBUMIN SERPL-MCNC: 3.5 G/DL (ref 3.5–5.2)
ALBUMIN/GLOB SERPL: 1.1 G/DL
ALP SERPL-CCNC: 85 U/L (ref 39–117)
ALT SERPL W P-5'-P-CCNC: 69 U/L (ref 1–33)
ANION GAP SERPL CALCULATED.3IONS-SCNC: 11 MMOL/L (ref 5–15)
AST SERPL-CCNC: 23 U/L (ref 1–32)
BILIRUB SERPL-MCNC: 0.6 MG/DL (ref 0–1.2)
BUN SERPL-MCNC: 11 MG/DL (ref 6–20)
BUN/CREAT SERPL: 15.1 (ref 7–25)
CALCIUM SPEC-SCNC: 8.8 MG/DL (ref 8.6–10.5)
CHLORIDE SERPL-SCNC: 103 MMOL/L (ref 98–107)
CO2 SERPL-SCNC: 24 MMOL/L (ref 22–29)
CREAT SERPL-MCNC: 0.73 MG/DL (ref 0.57–1)
CRP SERPL-MCNC: 3.22 MG/DL (ref 0–0.5)
DEPRECATED RDW RBC AUTO: 41.6 FL (ref 37–54)
EGFRCR SERPLBLD CKD-EPI 2021: 115 ML/MIN/1.73
ERYTHROCYTE [DISTWIDTH] IN BLOOD BY AUTOMATED COUNT: 13.4 % (ref 12.3–15.4)
ERYTHROCYTE [SEDIMENTATION RATE] IN BLOOD: 28 MM/HR (ref 0–20)
GLOBULIN UR ELPH-MCNC: 3.2 GM/DL
GLUCOSE SERPL-MCNC: 83 MG/DL (ref 65–99)
HCT VFR BLD AUTO: 37.8 % (ref 34–46.6)
HGB BLD-MCNC: 12.4 G/DL (ref 12–15.9)
MCH RBC QN AUTO: 28.2 PG (ref 26.6–33)
MCHC RBC AUTO-ENTMCNC: 32.8 G/DL (ref 31.5–35.7)
MCV RBC AUTO: 85.9 FL (ref 79–97)
PLATELET # BLD AUTO: 378 10*3/MM3 (ref 140–450)
PMV BLD AUTO: 9 FL (ref 6–12)
POTASSIUM SERPL-SCNC: 3.2 MMOL/L (ref 3.5–5.2)
PROT SERPL-MCNC: 6.7 G/DL (ref 6–8.5)
RBC # BLD AUTO: 4.4 10*6/MM3 (ref 3.77–5.28)
SODIUM SERPL-SCNC: 138 MMOL/L (ref 136–145)
WBC NRBC COR # BLD AUTO: 14.09 10*3/MM3 (ref 3.4–10.8)

## 2024-04-25 PROCEDURE — 86140 C-REACTIVE PROTEIN: CPT | Performed by: HOSPITALIST

## 2024-04-25 PROCEDURE — 25010000002 HYDROMORPHONE 1 MG/ML SOLUTION: Performed by: STUDENT IN AN ORGANIZED HEALTH CARE EDUCATION/TRAINING PROGRAM

## 2024-04-25 PROCEDURE — 99222 1ST HOSP IP/OBS MODERATE 55: CPT | Performed by: STUDENT IN AN ORGANIZED HEALTH CARE EDUCATION/TRAINING PROGRAM

## 2024-04-25 PROCEDURE — 25010000002 PROCHLORPERAZINE 10 MG/2ML SOLUTION: Performed by: STUDENT IN AN ORGANIZED HEALTH CARE EDUCATION/TRAINING PROGRAM

## 2024-04-25 PROCEDURE — 80053 COMPREHEN METABOLIC PANEL: CPT | Performed by: STUDENT IN AN ORGANIZED HEALTH CARE EDUCATION/TRAINING PROGRAM

## 2024-04-25 PROCEDURE — 99223 1ST HOSP IP/OBS HIGH 75: CPT | Performed by: NURSE PRACTITIONER

## 2024-04-25 PROCEDURE — 25810000003 LACTATED RINGERS PER 1000 ML: Performed by: STUDENT IN AN ORGANIZED HEALTH CARE EDUCATION/TRAINING PROGRAM

## 2024-04-25 PROCEDURE — 25010000002 ONDANSETRON PER 1 MG: Performed by: STUDENT IN AN ORGANIZED HEALTH CARE EDUCATION/TRAINING PROGRAM

## 2024-04-25 PROCEDURE — 25010000002 PIPERACILLIN SOD-TAZOBACTAM PER 1 G: Performed by: STUDENT IN AN ORGANIZED HEALTH CARE EDUCATION/TRAINING PROGRAM

## 2024-04-25 PROCEDURE — 85652 RBC SED RATE AUTOMATED: CPT | Performed by: HOSPITALIST

## 2024-04-25 PROCEDURE — 85027 COMPLETE CBC AUTOMATED: CPT | Performed by: STUDENT IN AN ORGANIZED HEALTH CARE EDUCATION/TRAINING PROGRAM

## 2024-04-25 RX ADMIN — HYDROMORPHONE HYDROCHLORIDE 0.5 MG: 1 INJECTION, SOLUTION INTRAMUSCULAR; INTRAVENOUS; SUBCUTANEOUS at 21:10

## 2024-04-25 RX ADMIN — ONDANSETRON 4 MG: 2 INJECTION INTRAMUSCULAR; INTRAVENOUS at 12:51

## 2024-04-25 RX ADMIN — HYDROMORPHONE HYDROCHLORIDE 0.5 MG: 1 INJECTION, SOLUTION INTRAMUSCULAR; INTRAVENOUS; SUBCUTANEOUS at 18:53

## 2024-04-25 RX ADMIN — PIPERACILLIN AND TAZOBACTAM 3.38 G: 3; .375 INJECTION, POWDER, FOR SOLUTION INTRAVENOUS at 21:10

## 2024-04-25 RX ADMIN — POTASSIUM CHLORIDE 40 MEQ: 1500 TABLET, EXTENDED RELEASE ORAL at 04:50

## 2024-04-25 RX ADMIN — PROCHLORPERAZINE EDISYLATE 10 MG: 5 INJECTION INTRAMUSCULAR; INTRAVENOUS at 00:43

## 2024-04-25 RX ADMIN — HYDROMORPHONE HYDROCHLORIDE 0.5 MG: 1 INJECTION, SOLUTION INTRAMUSCULAR; INTRAVENOUS; SUBCUTANEOUS at 16:29

## 2024-04-25 RX ADMIN — SODIUM CHLORIDE, POTASSIUM CHLORIDE, SODIUM LACTATE AND CALCIUM CHLORIDE 100 ML/HR: 600; 310; 30; 20 INJECTION, SOLUTION INTRAVENOUS at 18:46

## 2024-04-25 RX ADMIN — ONDANSETRON 4 MG: 2 INJECTION INTRAMUSCULAR; INTRAVENOUS at 04:50

## 2024-04-25 RX ADMIN — PIPERACILLIN AND TAZOBACTAM 3.38 G: 3; .375 INJECTION, POWDER, FOR SOLUTION INTRAVENOUS at 12:43

## 2024-04-25 RX ADMIN — HYDROMORPHONE HYDROCHLORIDE 0.5 MG: 1 INJECTION, SOLUTION INTRAMUSCULAR; INTRAVENOUS; SUBCUTANEOUS at 04:50

## 2024-04-25 RX ADMIN — HYDROMORPHONE HYDROCHLORIDE 0.5 MG: 1 INJECTION, SOLUTION INTRAMUSCULAR; INTRAVENOUS; SUBCUTANEOUS at 00:43

## 2024-04-25 RX ADMIN — Medication 10 ML: at 21:10

## 2024-04-25 RX ADMIN — PROCHLORPERAZINE EDISYLATE 10 MG: 5 INJECTION INTRAMUSCULAR; INTRAVENOUS at 21:19

## 2024-04-25 RX ADMIN — PIPERACILLIN AND TAZOBACTAM 3.38 G: 3; .375 INJECTION, POWDER, FOR SOLUTION INTRAVENOUS at 05:01

## 2024-04-25 RX ADMIN — HYDROMORPHONE HYDROCHLORIDE 0.5 MG: 1 INJECTION, SOLUTION INTRAMUSCULAR; INTRAVENOUS; SUBCUTANEOUS at 12:52

## 2024-04-25 RX ADMIN — ONDANSETRON 4 MG: 2 INJECTION INTRAMUSCULAR; INTRAVENOUS at 18:53

## 2024-04-25 NOTE — CONSULTS
GI CONSULT  NOTE:    Referring Provider:  Dr. Richards    Chief complaint: Abdominal pain    Subjective .     History of present illness: Patient is a 28-year-old female with history of cholecystectomy and ileocolonic Crohn's disease that has been complicated by abscess.  She was previously treated with Humira but lost insurance coverage in January 2024 and was not on medication for a while.  She recently completed prednisone taper as of yesterday.  She started Humira biosimilar just 2 weeks ago.  She has been experiencing Crohn's flare symptoms over the past couple months but yesterday while at work she had severe abdominal pain that was located on both sides of the abdomen.  It was stabbing and caused nausea/vomiting.  She has diarrhea on some days but she can also skip days intermittently without moving her bowels.  She denies any recent fevers.  She had been seen in our office recently and was scheduled for EGD and colonoscopy.  She has never had previous surgery for Crohn's.      Endo History:  2020 colonoscopy in [facility] which showed Crohn's disease.    Past Medical History:  Past Medical History:   Diagnosis Date    Crohn's disease     Psoriasis        Past Surgical History:  Past Surgical History:   Procedure Laterality Date    LAPAROSCOPIC CHOLECYSTECTOMY      ROTATOR CUFF REPAIR Right        Social History:  Social History     Tobacco Use    Smoking status: Never   Vaping Use    Vaping status: Never Used   Substance Use Topics    Alcohol use: Not Currently    Drug use: Never       Family History:  Family History   Problem Relation Age of Onset    No Known Problems Mother     No Known Problems Father     Cancer Maternal Grandmother        Medications:  Medications Prior to Admission   Medication Sig Dispense Refill Last Dose    Adalimumab-adaz (HYRIMOZ SC) Inject  under the skin into the appropriate area as directed Every 14 (Fourteen) Days.       predniSONE (DELTASONE) 20 MG tablet Take 0.5 tablets by  "mouth Daily.   4/24/2024       Scheduled Meds:piperacillin-tazobactam, 3.375 g, Intravenous, Q8H  sodium chloride, 10 mL, Intravenous, Q12H      Continuous Infusions:lactated ringers, 100 mL/hr, Last Rate: 100 mL/hr (04/25/24 0719)  Pharmacy to Dose Zosyn,       PRN Meds:.  Calcium Replacement - Follow Nurse / BPA Driven Protocol    HYDROmorphone    Magnesium Standard Dose Replacement - Follow Nurse / BPA Driven Protocol    nitroglycerin    ondansetron ODT **OR** ondansetron    Pharmacy to Dose Zosyn    Phosphorus Replacement - Follow Nurse / BPA Driven Protocol    Potassium Replacement - Follow Nurse / BPA Driven Protocol    prochlorperazine    [COMPLETED] Insert Peripheral IV **AND** sodium chloride    sodium chloride    sodium chloride    ALLERGIES:  Patient has no known allergies.    ROS:  Review of Systems   Constitutional:  Negative for chills and fever.   Respiratory:  Negative for cough and shortness of breath.    Cardiovascular:  Negative for chest pain and palpitations.   Gastrointestinal:  Positive for abdominal pain, diarrhea, nausea and vomiting.   Neurological:  Negative for dizziness and weakness.   Psychiatric/Behavioral:  Negative for agitation, confusion and hallucinations.        Objective     Vital Signs:   Visit Vitals  /81 (BP Location: Right arm, Patient Position: Lying)   Pulse 70   Temp 98.4 °F (36.9 °C) (Oral)   Resp 18   Ht 170.2 cm (67\")   Wt 113 kg (250 lb)   LMP 04/09/2024   SpO2 94%   BMI 39.16 kg/m²       Physical Exam:      General Appearance:    Awake and alert, in no acute distress   Head:    Normocephalic, without obvious abnormality, atraumatic   Eyes:            Conjunctivae normal, anicteric sclera   Ears:    Ears appear intact with no abnormalities noted   Throat:   No oral lesions, no thrush, oral mucosa moist       Lungs:     Respirations regular, even and unlabored       Chest Wall:    No abnormalities observed   Abdomen:     Soft, tenderness right abdomen, no rebound " or guarding, non-distended   Rectal:     Deferred   Extremities:   Moves all extremities well, no edema, no cyanosis, no  redness   Pulses:   Pulses palpable and equal bilaterally   Skin:   No bleeding, bruising or rash, no jaundice   Lymph nodes:   No palpable adenopathy   Neurologic:   Sensation intact       Results Review:   I reviewed the patient's labs and imaging.  CBC  Results from last 7 days   Lab Units 04/25/24  0019 04/24/24  1816   RBC 10*6/mm3 4.40 4.80   WBC 10*3/mm3 14.09* 13.71*   HEMOGLOBIN g/dL 12.4 13.4   PLATELETS 10*3/mm3 378 438       CMP  Results from last 7 days   Lab Units 04/25/24  0019 04/24/24  1816   SODIUM mmol/L 138 136   POTASSIUM mmol/L 3.2* 3.4*   CHLORIDE mmol/L 103 100   CO2 mmol/L 24.0 26.0   BUN mg/dL 11 13   CREATININE mg/dL 0.73 0.78   GLUCOSE mg/dL 83 83   ALBUMIN g/dL 3.5 4.0   BILIRUBIN mg/dL 0.6 0.3   ALK PHOS U/L 85 92   AST (SGOT) U/L 23 24   ALT (SGPT) U/L 69* 77*       Amylase and Lipase  Results from last 7 days   Lab Units 04/24/24  1816   LIPASE U/L 31       CRP     Results from last 7 days   Lab Units 04/25/24  0019   CRP mg/dL 3.22*       Imaging Results (Last 24 Hours)       Procedure Component Value Units Date/Time    CT Abdomen Pelvis With Contrast [[id number]] Collected: 04/24/24 1948     Updated: 04/24/24 2004    Narrative:      CT ABDOMEN PELVIS W CONTRAST    Date of Exam: 4/24/2024 7:22 PM EDT    Indication: Abdominal pain, vomiting, history of Crohn's disease.    Comparison: CT abdomen pelvis 9/15/2021    Technique: Axial CT images were obtained of the abdomen and pelvis following the uneventful intravenous administration of iodinated contrast. Sagittal and coronal reconstructions were performed.  Automated exposure control and iterative reconstruction   methods were used.        Findings:  Heart size normal. Lower lungs clear.    Size and contour of liver and spleen within normal limits and similar to prior. Mild hepatic steatosis. Cholecystectomy. No  dilation of biliary tree. No active pancreatitis.    No adrenal nodule. Stable low-density 1.6 cm left inferior pole lesion likely benign simple cysts. Symmetric renal size, contour and enhancement. No hydronephrosis or calcified stone. Urinary bladder unremarkable. Uterus unremarkable. Physiologic left   corpus luteal cyst. There is a mixed density 5.5 cm right ovarian lesion containing macroscopic fat, soft tissue density and calcifications compatible with ovarian dermoid.    Expected configuration stomach and duodenum. Segmental wall thickening, hyperemia and surrounding inflammation centered at the terminal ileum spanning approximately 7 cm in length suspicious for active Crohn's flare. There is an adjacent ill-defined   inflammatory mass in the central mesentery example coronal image 57 measuring up to 5.4 x 3.5 cm. There is suggestion of multiple complex emanating tracts versus developing complex fistula. Very faint foci of extraluminal gas example coronal image 60   series 4. Traversing loops of adjacent small bowel do not appear involved. No drainable collection. Submucosal fat deposition within the more proximal terminal ileum suggesting sequelae of prior inflammation. Mild fecalized distal small bowel. No dilated   loops of bowel to suggest obstruction. Normal appendix.    Negative for abdominal aortic aneurysm. Prominent lymph nodes in the right lower quadrant central mesentery, consistent with reactive lymph nodes. No ascites. Soft tissues unremarkable. No acute displaced fracture or aggressive lesion.      Impression:      Impression:  1. CT findings consistent with active Crohn's flare centered at the terminal ileum. There is an inflammatory mass centered in the adjacent right lower quadrant mesentery with ill-defined tracts versus developing complex fistula and at least 1 tiny focus   of extraluminal gas (coronal image 60 series 4). No drainable collection/abscess. No associated bowel obstruction.  2.  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  3. Hepatic steatosis.            Electronically Signed: Kevin Quintana MD    4/24/2024 8:02 PM EDT    Workstation ID: [id number]              ASSESSMENT AND PLAN:  28-year-old female with ileocolonic Crohn's disease previously on Humira but lost insurance coverage in January 2024 and off medication presented 4/24/2024 with worsening abdominal pain and CT suggests inflammatory changes at the terminal ileum with microperforation and fistula.    Crohn's exacerbation with inflammation at terminal ileum/microperforation/fistula  Right-sided abdominal pain  Nausea/vomiting  Leukocytosis  Previous Crohn's related abscess  Elevated ALT/hepatic steatosis    Principal Problem:    Crohn disease     Plan:  28-year-old female with history of Crohn's and previously on Humira lost insurance coverage January 2024 was off medication.  Symptoms have been flaring over the past few months.  She has completed prednisone taper as of yesterday and just 2 weeks ago started Humira biosimilar.  She has history of Crohn's related abscess and was hospitalized in the past.  Current CT shows active Crohn's in the terminal ileum with inflammatory mass and microperforation as well as developing complex fistula.  WBC 14.  ALT 69 otherwise liver enzymes unremarkable.  ESR 28 and CRP 3.22.  She continues on IV fluids and antibiotics.  She will remain NPO.  Colorectal surgery has been consulted to discuss surgical options with her.  Hold off on Biologics at this time.  Supportive care.    I discussed the patients findings and my recommendations with the patient.  Rekha Jernigan, GASTON  04/25/24  10:27 EDT

## 2024-04-25 NOTE — PLAN OF CARE
Goal Outcome Evaluation:         Admitted from ED for abdominal pain, crohns. C/o nausea and pain on right side of abdomen radiating to left side, medicated per MAR. Patient's NPO, sips with meds. Heart monitor/oxygen saturation in place. SCDs on. Plan of care is ongoing. Call light within reach.

## 2024-04-25 NOTE — PAYOR COMM NOTE
"Carin Callahan (28 y.o. Female)  1995  POLICY NO. [id number]   REQUESTING IP AUTH FOR ER MEDICAL ADMISSION    AUTHORIZATION PENDING  PLEASE FORWARD DETERMINATION TO FOLLOWING CONTACT:    KATELIN LAY LPN UR  Utilization Review Nurse  [facility]  Direct & confidential phone # [phone]  Fax # [phone]        Date of Birth   1995    Social Security Number       Address   [address] [place] [address]    Home Phone   [phone]    MRN   [id number]       Episcopalian   Unknown    Marital Status                               Admission Date   24    Admission Type   Emergency    Admitting Provider   Carmelo Richards MD    Attending Provider   Carmelo Richards MD    Department, Room/Bed   [facility] SURGICAL INPATIENT,        Discharge Date       Discharge Disposition       Discharge Destination                                 Attending Provider: Carmelo Richards MD    Allergies: No Known Allergies    Isolation: None   Infection: None   Code Status: CPR    Ht: 170.2 cm (67\")   Wt: 113 kg (250 lb)    Admission Cmt: None   Principal Problem: Crohn disease [K50.90]                   Active Insurance as of 2024       Primary Coverage       Payor Plan Insurance Group Employer/Plan Group    [facility] [employer] [facility] [employer] [facility] PPO [id number]       Payor Plan Address Payor Plan Phone Number Payor Plan Fax Number Effective Dates    [address] [phone]  2023 - None Entered    [place] [address]         Subscriber Name Subscriber Birth Date Member ID       BEAR CALLAHAN T [birth year]/6/1994 [id number]                     Emergency Contacts        (Rel.) Home Phone Work Phone Mobile Phone    BEAR CALLAHAN (Spouse) [phone] -- [phone]    MAYUR CONDE (Mother) [phone] -- [phone]                 History & Physical        Dana Agudelo DO at [address] [facility] [place]              [facility] " Services    Hospitalist History and Physical     Carin Price : 1995 MRN:[id number] LOS:0 ROOM: 35     Chief Complaint: Abdominal pain    Assessment / Plan     #Crohn's flare with complication    - CT a/p consistent with active Crohn's flare centered at the terminal ileum with inflammatory mass centered in the adjacent RLQ with ill defined tracts vs developing complex fistula and at least one tiny focus of extraluminal gas    - ER discussed with colorectal surgery and GI, concern for infection and possible microperforation, tx with abx and they will assess tomorrow, hold off steroids at this time    - Zosyn, pharm to dose, monitor for toxicity    - NPO     - Compazine and Zofran PRN    - Dilaudid PRN pain    - GI consulted    - Colorectal surgery consulted    -LR @ 100/hr     - blood cultures    - lactic 0.9    - lipase 31    - wbc 13.71, trend    - hemodynamically stable    #Obesity    - BMI 39.16, weight loss encouraged    #Hypokalemia    - replace per protocol    #Hepatic steatosis    - evident on CT a/p    #Ovarian teratoma    - 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         History of Present illness     Carin Price is a 28 y.o. female with PMHx of Crohn's disease presented to [facility] for abdominal pain.  Admits to 7/10 sharp abdomonial pain that is worse in RLQ that started more acutely today when she was lifting items at work and developed acute abdominal pain.  States her Crohn's has been flaring off and on for the past several months but this is worse than normal.  Symptoms complicated by nausea and vomiting.  States she had an abscess in  and it presented in a similar fashion.  She called her GI  office who recommended she go to [facility] for evalaution.      ED course: In the Ed, vitals 97.7, HR 64, RR 18, /90, 96% RA. Labs notable for K 3.4, lipase 31, lactic 0.9, wbc 13.71.  hcg negative.  CT a/p consistent with active Crohn's flare centered at the terminal ileum with inflammatory mass centered in the adjacent RLQ with ill defined tracts vs developing complex fistula and at least one tiny focus of extraluminal gas.  GI and colorectal surgery contacted in ED, recommended IV abx and will evaluate in the morning.    Patient was seen and examined on 04/24/24 at 22:17 EDT .    Subjective / Review of systems     Review of Systems  12 point ROS reviewed and negative except as mentioned above       Past Medical/Surgical/Social/Family History & Allergies     Past Medical History:   Diagnosis Date    Crohn's disease       Past Surgical History:   Procedure Laterality Date    LAPAROSCOPIC CHOLECYSTECTOMY        Social History     Socioeconomic History    Marital status:    Tobacco Use    Smoking status: Never   Vaping Use    Vaping status: Never Used   Substance and Sexual Activity    Alcohol use: Not Currently    Drug use: Never    Sexual activity: Defer      Family History   Problem Relation Age of Onset    No Known Problems Mother     No Known Problems Father       No Known Allergies   Social Determinants of Health     Tobacco Use: Low Risk  (4/13/2024)    Received from [facility]    Patient History     Smoking Tobacco Use: Never     Smokeless Tobacco Use: Never     Passive Exposure: Not on file   Alcohol Use: Not At Risk (8/11/2021)    AUDIT-C     Frequency of Alcohol Consumption: Never     Average Number of Drinks: Not on file     Frequency of Binge Drinking: Not on file   Financial Resource Strain: Not on file   Food Insecurity: Not on file   Transportation Needs: Not on file   Physical Activity: Not on file   Stress: Not on file   Social Connections: Unknown (10/13/2023)     Family and Community Support     Help with Day-to-Day Activities: Not on file     Lonely or Isolated: Not on file   Interpersonal Safety: Not At Risk (4/24/2024)    Abuse Screen     Unsafe at Home or Work/School: no     Feels Threatened by Someone?: no     Does Anyone Keep You from Contacting Others or Doint Things Outside the Home?: no     Physical Sign of Abuse Present: no   Depression: Not at risk (3/5/2024)    Received from [facility]    PHQ-2     Patient Health Questionnaire-2 Score: 0   Housing Stability: Unknown (10/13/2023)    Housing Stability     Current Living Arrangements: Not on file     Potentially Unsafe Housing Conditions: Not on file   Utilities: Not on file   Health Literacy: Unknown (10/13/2023)    Education     Help with school or training?: Not on file     Preferred Language: Not on file   Employment: Unknown (10/13/2023)    Employment     Do you want help finding or keeping work or a job?: Not on file   Disabilities: Unknown (10/13/2023)    Disabilities     Concentrating, Remembering, or Making Decisions Difficulty: Not on file     Doing Errands Independently Difficulty: Not on file        Home Medications     Prior to Admission medications    Medication Sig Start Date End Date Taking? Authorizing Provider   Adalimumab-adaz (HYRIMOZ SC) Inject  under the skin into the appropriate area as directed Every 14 (Fourteen) Days.   Yes Shannan Fowler MD   predniSONE (DELTASONE) 20 MG tablet Take 0.5 tablets by mouth Daily.  4/26/24 Yes Shannan Fowler MD   ferrous sulfate 325 (65 FE) MG tablet Take 325 mg by mouth Daily With Breakfast.  4/24/24  Shannan Fowler MD        Objective / Physical Exam     Vital signs:  Temp: 97.7 °F (36.5 °C)  BP: 152/90  Heart Rate: 64  Resp: 18  SpO2: (!) 0 %  Weight: 113 kg (250 lb)    Admission Weight: Weight: 113 kg (250 lb)    Physical Exam  Constitutional:       General: She is not in acute distress.     Appearance:  Normal appearance. She is not toxic-appearing.   HENT:      Head: Normocephalic and atraumatic.      Nose: Nose normal. No congestion.      Mouth/Throat:      Pharynx: Oropharynx is clear. No oropharyngeal exudate.   Eyes:      General: No scleral icterus.  Cardiovascular:      Rate and Rhythm: Normal rate and regular rhythm.      Heart sounds: No murmur heard.     No friction rub. No gallop.   Pulmonary:      Effort: No respiratory distress.      Breath sounds: No wheezing or rales.   Abdominal:      General: There is no distension.      Tenderness: There is abdominal tenderness. There is no guarding.   Musculoskeletal:         General: No swelling or deformity.      Cervical back: Normal range of motion. No rigidity.      Right lower leg: No edema.      Left lower leg: No edema.   Skin:     Coloration: Skin is not jaundiced.      Findings: No bruising or lesion.   Neurological:      General: No focal deficit present.      Mental Status: She is alert and oriented to person, place, and time.      Motor: No weakness.            Labs     Results from last 7 days   Lab Units 04/24/24  1816   WBC 10*3/mm3 13.71*   HEMOGLOBIN g/dL 13.4   HEMATOCRIT % 41.8   PLATELETS 10*3/mm3 438      Results from last 7 days   Lab Units 04/24/24  1816   ALK PHOS U/L 92   AST (SGOT) U/L 24   ALT (SGPT) U/L 77*           Results from last 7 days   Lab Units 04/24/24  1816   SODIUM mmol/L 136   POTASSIUM mmol/L 3.4*   CHLORIDE mmol/L 100   CO2 mmol/L 26.0   BUN mg/dL 13   CREATININE mg/dL 0.78   GLUCOSE mg/dL 83        Imaging     CT Abdomen Pelvis With Contrast    Result Date: 4/24/2024  CT ABDOMEN PELVIS W CONTRAST Date of Exam: 4/24/2024 7:22 PM EDT Indication: Abdominal pain, vomiting, history of Crohn's disease. Comparison: CT abdomen pelvis 9/15/2021 Technique: Axial CT images were obtained of the abdomen and pelvis following the uneventful intravenous administration of iodinated contrast. Sagittal and coronal reconstructions were  performed.  Automated exposure control and iterative reconstruction methods were used. Findings: Heart size normal. Lower lungs clear. Size and contour of liver and spleen within normal limits and similar to prior. Mild hepatic steatosis. Cholecystectomy. No dilation of biliary tree. No active pancreatitis. No adrenal nodule. Stable low-density 1.6 cm left inferior pole lesion likely benign simple cysts. Symmetric renal size, contour and enhancement. No hydronephrosis or calcified stone. Urinary bladder unremarkable. Uterus unremarkable. Physiologic left corpus luteal cyst. There is a mixed density 5.5 cm right ovarian lesion containing macroscopic fat, soft tissue density and calcifications compatible with ovarian dermoid. Expected configuration stomach and duodenum. Segmental wall thickening, hyperemia and surrounding inflammation centered at the terminal ileum spanning approximately 7 cm in length suspicious for active Crohn's flare. There is an adjacent ill-defined inflammatory mass in the central mesentery example coronal image 57 measuring up to 5.4 x 3.5 cm. There is suggestion of multiple complex emanating tracts versus developing complex fistula. Very faint foci of extraluminal gas example coronal image 60 series 4. Traversing loops of adjacent small bowel do not appear involved. No drainable collection. Submucosal fat deposition within the more proximal terminal ileum suggesting sequelae of prior inflammation. Mild fecalized distal small bowel. No dilated  loops of bowel to suggest obstruction. Normal appendix. Negative for abdominal aortic aneurysm. Prominent lymph nodes in the right lower quadrant central mesentery, consistent with reactive lymph nodes. No ascites. Soft tissues unremarkable. No acute displaced fracture or aggressive lesion.     Impression: 1. CT findings consistent with active Crohn's flare centered at the terminal ileum. There is an inflammatory mass centered in the adjacent right lower  quadrant mesentery with ill-defined tracts versus developing complex fistula and at least 1 tiny focus of extraluminal gas (coronal image 60 series 4). No drainable collection/abscess. No associated bowel obstruction. 2. 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 3. Hepatic steatosis. Electronically Signed: Kevin Quintana MD  4/24/2024 8:02 PM EDT  Workstation ID: [id number]        Current Medications     Scheduled Meds:  [START ON 4/25/2024] piperacillin-tazobactam, 3.375 g, Intravenous, Q8H  sodium chloride, 10 mL, Intravenous, Q12H         Continuous Infusions:  lactated ringers, 100 mL/hr  Pharmacy to Dose Dana House DO   [facility]  04/24/24   22:17 EDT       Electronically signed by Dana Agudelo DO at 04/24/24 5630          Emergency Department Notes        Mason Chowdhury MD at 04/24/24 1814          Subjective   History of Present Illness  Chief complaint: Abdominal pain    28-year-old female presents with abdominal pain.  Patient has a history of Crohn's disease.  She states she has been having a flare of her Crohn's disease for the past couple of months.  Today she was at work and was lifting something up above her head when she had sudden sharp pain in her epigastric area that radiated down both sides of her abdomen.  She had an episode of nausea and vomiting.  She denies fever.  She states she called her gastroenterologist and they recommended she come to the emergency room.  She states pain has improved somewhat but she is still having pain when she walks.  Pain is better when she sits.    History provided by:  Patient      Review of Systems   Constitutional:  Negative for fever.   HENT:  Negative for congestion.    Respiratory:  Negative for cough and shortness of breath.    Cardiovascular:  Negative for chest pain.   Gastrointestinal:  Positive for abdominal pain, nausea and vomiting.   Genitourinary:  Negative for dysuria.  "  Musculoskeletal:  Negative for back pain.   Neurological:  Negative for headaches.       Past Medical History:   Diagnosis Date    Crohn's disease        No Known Allergies    Past Surgical History:   Procedure Laterality Date    LAPAROSCOPIC CHOLECYSTECTOMY         Family History   Problem Relation Age of Onset    No Known Problems Mother     No Known Problems Father        Social History     Socioeconomic History    Marital status:    Tobacco Use    Smoking status: Never   Vaping Use    Vaping status: Never Used   Substance and Sexual Activity    Alcohol use: Not Currently    Drug use: Never    Sexual activity: Defer       /80   Pulse 67   Temp 97.7 °F (36.5 °C) (Oral)   Resp 18   Ht 170.2 cm (67\")   Wt 113 kg (250 lb)   LMP 04/09/2024   SpO2 96%   BMI 39.16 kg/m²       Objective   Physical Exam  Vitals and nursing note reviewed.   Constitutional:       Appearance: She is well-developed.   HENT:      Head: Normocephalic and atraumatic.      Mouth/Throat:      Mouth: Mucous membranes are moist.   Cardiovascular:      Rate and Rhythm: Normal rate and regular rhythm.      Heart sounds: Normal heart sounds.   Pulmonary:      Effort: Pulmonary effort is normal. No respiratory distress.      Breath sounds: Normal breath sounds.   Abdominal:      General: Bowel sounds are normal.      Palpations: Abdomen is soft.      Tenderness:  in the right upper quadrant, right lower quadrant, epigastric area, left upper quadrant and left lower quadrant There is no guarding or rebound.   Skin:     General: Skin is warm and dry.   Neurological:      Mental Status: She is alert and oriented to person, place, and time.         Procedures          ED Course      Results for orders placed or performed during the hospital encounter of 04/24/24   Comprehensive Metabolic Panel    Specimen: Blood   Result Value Ref Range    Glucose 83 65 - 99 mg/dL    BUN 13 6 - 20 mg/dL    Creatinine 0.78 0.57 - 1.00 mg/dL    Sodium " 136 136 - 145 mmol/L    Potassium 3.4 (L) 3.5 - 5.2 mmol/L    Chloride 100 98 - 107 mmol/L    CO2 26.0 22.0 - 29.0 mmol/L    Calcium 9.4 8.6 - 10.5 mg/dL    Total Protein 7.7 6.0 - 8.5 g/dL    Albumin 4.0 3.5 - 5.2 g/dL    ALT (SGPT) 77 (H) 1 - 33 U/L    AST (SGOT) 24 1 - 32 U/L    Alkaline Phosphatase 92 39 - 117 U/L    Total Bilirubin 0.3 0.0 - 1.2 mg/dL    Globulin 3.7 gm/dL    A/G Ratio 1.1 g/dL    BUN/Creatinine Ratio 16.7 7.0 - 25.0    Anion Gap 10.0 5.0 - 15.0 mmol/L    eGFR 106.3 >60.0 mL/min/1.73   Lipase    Specimen: Blood   Result Value Ref Range    Lipase 31 13 - 60 U/L   Urinalysis With Microscopic If Indicated (No Culture) - Urine, Clean Catch    Specimen: Urine, Clean Catch   Result Value Ref Range    Color, UA Yellow Yellow, Straw    Appearance, UA Clear Clear    pH, UA 6.5 5.0 - 8.0    Specific Gravity, UA 1.031 (H) 1.005 - 1.030    Glucose, UA Negative Negative    Ketones, UA Trace (A) Negative    Bilirubin, UA Negative Negative    Blood, UA Negative Negative    Protein, UA Trace (A) Negative    Leuk Esterase, UA Negative Negative    Nitrite, UA Negative Negative    Urobilinogen, UA 1.0 E.U./dL 0.2 - 1.0 E.U./dL   Pregnancy, Urine - Urine, Clean Catch    Specimen: Urine, Clean Catch   Result Value Ref Range    HCG, Urine QL Negative Negative   CBC Auto Differential    Specimen: Blood   Result Value Ref Range    WBC 13.71 (H) 3.40 - 10.80 10*3/mm3    RBC 4.80 3.77 - 5.28 10*6/mm3    Hemoglobin 13.4 12.0 - 15.9 g/dL    Hematocrit 41.8 34.0 - 46.6 %    MCV 87.1 79.0 - 97.0 fL    MCH 27.9 26.6 - 33.0 pg    MCHC 32.1 31.5 - 35.7 g/dL    RDW 13.4 12.3 - 15.4 %    RDW-SD 43.1 37.0 - 54.0 fl    MPV 8.9 6.0 - 12.0 fL    Platelets 438 140 - 450 10*3/mm3    Neutrophil % 62.7 42.7 - 76.0 %    Lymphocyte % 27.1 19.6 - 45.3 %    Monocyte % 8.9 5.0 - 12.0 %    Eosinophil % 0.7 0.3 - 6.2 %    Basophil % 0.3 0.0 - 1.5 %    Immature Grans % 0.3 0.0 - 0.5 %    Neutrophils, Absolute 8.61 (H) 1.70 - 7.00 10*3/mm3     Lymphocytes, Absolute 3.71 (H) 0.70 - 3.10 10*3/mm3    Monocytes, Absolute 1.22 (H) 0.10 - 0.90 10*3/mm3    Eosinophils, Absolute 0.09 0.00 - 0.40 10*3/mm3    Basophils, Absolute 0.04 0.00 - 0.20 10*3/mm3    Immature Grans, Absolute 0.04 0.00 - 0.05 10*3/mm3    nRBC 0.0 0.0 - 0.2 /100 WBC   POC Lactate    Specimen: Blood   Result Value Ref Range    Lactate 0.9 0.3 - 2.0 mmol/L     CT Abdomen Pelvis With Contrast    Result Date: 4/24/2024  Impression: 1. CT findings consistent with active Crohn's flare centered at the terminal ileum. There is an inflammatory mass centered in the adjacent right lower quadrant mesentery with ill-defined tracts versus developing complex fistula and at least 1 tiny focus of extraluminal gas (coronal image 60 series 4). No drainable collection/abscess. No associated bowel obstruction. 2. 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 3. Hepatic steatosis. Electronically Signed: Kevin Quintana MD  4/24/2024 8:02 PM EDT  Workstation ID: [id number]                                          Medical Decision Making  Amount and/or Complexity of Data Reviewed  Labs: ordered.  Radiology: ordered.    Risk  Prescription drug management.      Patient had the above evaluation.  Results were discussed with patient.  White blood cell count is 13.7.  CMP is unremarkable.  Lipase is normal.  Urinalysis shows no UTI.  CT of the abdomen pelvis is showing evidence of active Crohn's flare is centered at the terminal ileum.  There is inflammatory mass centered in the adjacent right lower quadrant mesentery with ill-defined tracts versus developing complex fistula and at least 1 tiny focus of extraluminal gas.  There is no drainable abscess.  I discussed with gastroenterology who will consult on the patient.  I also discussed with colorectal surgery who will consult on the patient.  Patient was started on Zosyn.  I discussed with the hospitalist and patient will be admitted  for further evaluation and management.      Final diagnoses:   Crohn's disease of ileum with other complication   Abdominal pain, unspecified abdominal location       ED Disposition  ED Disposition       ED Disposition   Decision to Admit    Condition   --    Comment   Level of Care: Med/Surg [1]   Admitting Physician: KRISTEN PUENTE [[id number]]   Attending Physician: KRISTEN PUENTE [[id number]]                 No follow-up provider specified.       Medication List      No changes were made to your prescriptions during this visit.            Mason Chowdhury MD  04/24/24 2134      Electronically signed by Mason Chowdhury MD at 04/24/24 2134       Physician Progress Notes (last 24 hours)  Notes from 04/24/24 1016 through 04/25/24 1016   No notes of this type exist for this encounter.

## 2024-04-25 NOTE — CASE MANAGEMENT/SOCIAL WORK
Discharge Planning Assessment   [facility]     Patient Name: Carin Price  MRN: [id number]  Today's Date: 4/25/2024    Admit Date: 4/24/2024    Plan: Plan to return home with spouse pending clinical course.   Discharge Needs Assessment       Row Name 04/25/24 1219       Living Environment    People in Home spouse    Name(s) of People in Home Redd - spouse    Current Living Arrangements home    Potentially Unsafe Housing Conditions none    In the past 12 months has the electric, gas, oil, or water company threatened to shut off services in your home? No    Primary Care Provided by self    Provides Primary Care For no one    Family Caregiver if Needed spouse    Family Caregiver Names Redd - spouse    Quality of Family Relationships helpful;involved;supportive    Able to Return to Prior Arrangements yes       Resource/Environmental Concerns    Resource/Environmental Concerns none    Transportation Concerns none       Transportation Needs    In the past 12 months, has lack of transportation kept you from medical appointments or from getting medications? no    In the past 12 months, has lack of transportation kept you from meetings, work, or from getting things needed for daily living? No       Food Insecurity    Within the past 12 months, you worried that your food would run out before you got the money to buy more. Never true    Within the past 12 months, the food you bought just didn't last and you didn't have money to get more. Never true       Transition Planning    Patient/Family Anticipates Transition to home with family    Patient/Family Anticipated Services at Transition none    Transportation Anticipated car, drives self;family or friend will provide       Discharge Needs Assessment    Readmission Within the Last 30 Days no previous admission in last 30 days    Equipment Currently Used at Home none    Concerns to be Addressed discharge planning    Anticipated Changes Related to Illness none    Equipment  Needed After Discharge none                   Discharge Plan       Row Name 04/25/24 1229       Plan    Plan Plan to return home with spouse pending clinical course.    Patient/Family in Agreement with Plan yes    Plan Comments Patient lives at home with spouse Redd who will transport at discharge. Patient performs IADLs. PCP and pharmacy confirmed. Agreeable to M2B.  Denies financial assistance needs for medication and/or food. Denies any current DME, HH, Caregiver, or rehab services.  DC Barriers:  GI eval pending, WBC 14, blood cultures pending, IV Abxs, IV pain meds, NPO, Colorectal/GI following.               Expected Discharge Date and Time       Expected Discharge Date Expected Discharge Time    Apr 29, 2024            Demographic Summary       Row Name 04/25/24 1219       General Information    Admission Type inpatient    Arrived From emergency department    Required Notices Provided Important Message from Medicare    Referral Source admission list    Reason for Consult discharge planning    Preferred Language English                   Functional Status       Row Name 04/25/24 1219       Functional Status    Usual Activity Tolerance good    Current Activity Tolerance moderate       Functional Status, IADL    Medications independent    Meal Preparation independent    Housekeeping independent    Laundry independent    Shopping independent       Mental Status    General Appearance WDL WDL       Mental Status Summary    Recent Changes in Mental Status/Cognitive Functioning no changes           JERSEY Hugo RN  SIPS/ICU   O: [phone]  C: [phone]  [email or web address]

## 2024-04-25 NOTE — H&P
[facility] Medicine Services    Hospitalist History and Physical     Carin Price : 1995 MRN:[id number] LOS:0 ROOM: 35/     Chief Complaint: Abdominal pain    Assessment / Plan     #Crohn's flare with complication    - CT a/p consistent with active Crohn's flare centered at the terminal ileum with inflammatory mass centered in the adjacent RLQ with ill defined tracts vs developing complex fistula and at least one tiny focus of extraluminal gas    - ER discussed with colorectal surgery and GI, concern for infection and possible microperforation, tx with abx and they will assess tomorrow, hold off steroids at this time    - Zosyn, pharm to dose, monitor for toxicity    - NPO     - Compazine and Zofran PRN    - Dilaudid PRN pain    - GI consulted    - Colorectal surgery consulted    -LR @ 100/hr     - blood cultures    - lactic 0.9    - lipase 31    - wbc 13.71, trend    - hemodynamically stable    #Obesity    - BMI 39.16, weight loss encouraged    #Hypokalemia    - replace per protocol    #Hepatic steatosis    - evident on CT a/p    #Ovarian teratoma    - 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         History of Present illness     Carin Price is a 28 y.o. female with PMHx of Crohn's disease presented to [facility] for abdominal pain.  Admits to 7/10 sharp abdomonial pain that is worse in RLQ that started more acutely today when she was lifting items at work and developed acute abdominal pain.  States her Crohn's has been flaring off and on for the past several months but this is worse than normal.  Symptoms complicated by nausea and vomiting.  States she had an abscess in  and it presented in a similar  fashion.  She called her GI office who recommended she go to [facility] for evalaution.      ED course: In the Ed, vitals 97.7, HR 64, RR 18, /90, 96% RA. Labs notable for K 3.4, lipase 31, lactic 0.9, wbc 13.71.  hcg negative.  CT a/p consistent with active Crohn's flare centered at the terminal ileum with inflammatory mass centered in the adjacent RLQ with ill defined tracts vs developing complex fistula and at least one tiny focus of extraluminal gas.  GI and colorectal surgery contacted in ED, recommended IV abx and will evaluate in the morning.    Patient was seen and examined on 04/24/24 at 22:17 EDT .    Subjective / Review of systems     Review of Systems  12 point ROS reviewed and negative except as mentioned above       Past Medical/Surgical/Social/Family History & Allergies     Past Medical History:   Diagnosis Date    Crohn's disease       Past Surgical History:   Procedure Laterality Date    LAPAROSCOPIC CHOLECYSTECTOMY        Social History     Socioeconomic History    Marital status:    Tobacco Use    Smoking status: Never   Vaping Use    Vaping status: Never Used   Substance and Sexual Activity    Alcohol use: Not Currently    Drug use: Never    Sexual activity: Defer      Family History   Problem Relation Age of Onset    No Known Problems Mother     No Known Problems Father       No Known Allergies   Social Determinants of Health     Tobacco Use: Low Risk  (4/13/2024)    Received from [facility]    Patient History     Smoking Tobacco Use: Never     Smokeless Tobacco Use: Never     Passive Exposure: Not on file   Alcohol Use: Not At Risk (8/11/2021)    AUDIT-C     Frequency of Alcohol Consumption: Never     Average Number of Drinks: Not on file     Frequency of Binge Drinking: Not on file   Financial Resource Strain: Not on file   Food Insecurity: Not on file   Transportation Needs: Not on file   Physical Activity: Not on file   Stress: Not on file   Social  Connections: Unknown (10/13/2023)    Family and Community Support     Help with Day-to-Day Activities: Not on file     Lonely or Isolated: Not on file   Interpersonal Safety: Not At Risk (4/24/2024)    Abuse Screen     Unsafe at Home or Work/School: no     Feels Threatened by Someone?: no     Does Anyone Keep You from Contacting Others or Doint Things Outside the Home?: no     Physical Sign of Abuse Present: no   Depression: Not at risk (3/5/2024)    Received from [facility]    PHQ-2     Patient Health Questionnaire-2 Score: 0   Housing Stability: Unknown (10/13/2023)    Housing Stability     Current Living Arrangements: Not on file     Potentially Unsafe Housing Conditions: Not on file   Utilities: Not on file   Health Literacy: Unknown (10/13/2023)    Education     Help with school or training?: Not on file     Preferred Language: Not on file   Employment: Unknown (10/13/2023)    Employment     Do you want help finding or keeping work or a job?: Not on file   Disabilities: Unknown (10/13/2023)    Disabilities     Concentrating, Remembering, or Making Decisions Difficulty: Not on file     Doing Errands Independently Difficulty: Not on file        Home Medications     Prior to Admission medications    Medication Sig Start Date End Date Taking? Authorizing Provider   Adalimumab-adaz (HYRIMOZ SC) Inject  under the skin into the appropriate area as directed Every 14 (Fourteen) Days.   Yes Shannan Fowler MD   predniSONE (DELTASONE) 20 MG tablet Take 0.5 tablets by mouth Daily.  4/26/24 Yes Shannan Fowler MD   ferrous sulfate 325 (65 FE) MG tablet Take 325 mg by mouth Daily With Breakfast.  4/24/24  Shannan Fowler MD        Objective / Physical Exam     Vital signs:  Temp: 97.7 °F (36.5 °C)  BP: 152/90  Heart Rate: 64  Resp: 18  SpO2: (!) 0 %  Weight: 113 kg (250 lb)    Admission Weight: Weight: 113 kg (250 lb)    Physical Exam  Constitutional:       General: She is not  in acute distress.     Appearance: Normal appearance. She is not toxic-appearing.   HENT:      Head: Normocephalic and atraumatic.      Nose: Nose normal. No congestion.      Mouth/Throat:      Pharynx: Oropharynx is clear. No oropharyngeal exudate.   Eyes:      General: No scleral icterus.  Cardiovascular:      Rate and Rhythm: Normal rate and regular rhythm.      Heart sounds: No murmur heard.     No friction rub. No gallop.   Pulmonary:      Effort: No respiratory distress.      Breath sounds: No wheezing or rales.   Abdominal:      General: There is no distension.      Tenderness: There is abdominal tenderness. There is no guarding.   Musculoskeletal:         General: No swelling or deformity.      Cervical back: Normal range of motion. No rigidity.      Right lower leg: No edema.      Left lower leg: No edema.   Skin:     Coloration: Skin is not jaundiced.      Findings: No bruising or lesion.   Neurological:      General: No focal deficit present.      Mental Status: She is alert and oriented to person, place, and time.      Motor: No weakness.            Labs     Results from last 7 days   Lab Units 04/24/24  1816   WBC 10*3/mm3 13.71*   HEMOGLOBIN g/dL 13.4   HEMATOCRIT % 41.8   PLATELETS 10*3/mm3 438      Results from last 7 days   Lab Units 04/24/24  1816   ALK PHOS U/L 92   AST (SGOT) U/L 24   ALT (SGPT) U/L 77*           Results from last 7 days   Lab Units 04/24/24  1816   SODIUM mmol/L 136   POTASSIUM mmol/L 3.4*   CHLORIDE mmol/L 100   CO2 mmol/L 26.0   BUN mg/dL 13   CREATININE mg/dL 0.78   GLUCOSE mg/dL 83        Imaging     CT Abdomen Pelvis With Contrast    Result Date: 4/24/2024  CT ABDOMEN PELVIS W CONTRAST Date of Exam: 4/24/2024 7:22 PM EDT Indication: Abdominal pain, vomiting, history of Crohn's disease. Comparison: CT abdomen pelvis 9/15/2021 Technique: Axial CT images were obtained of the abdomen and pelvis following the uneventful intravenous administration of iodinated contrast. Sagittal  and coronal reconstructions were performed.  Automated exposure control and iterative reconstruction methods were used. Findings: Heart size normal. Lower lungs clear. Size and contour of liver and spleen within normal limits and similar to prior. Mild hepatic steatosis. Cholecystectomy. No dilation of biliary tree. No active pancreatitis. No adrenal nodule. Stable low-density 1.6 cm left inferior pole lesion likely benign simple cysts. Symmetric renal size, contour and enhancement. No hydronephrosis or calcified stone. Urinary bladder unremarkable. Uterus unremarkable. Physiologic left corpus luteal cyst. There is a mixed density 5.5 cm right ovarian lesion containing macroscopic fat, soft tissue density and calcifications compatible with ovarian dermoid. Expected configuration stomach and duodenum. Segmental wall thickening, hyperemia and surrounding inflammation centered at the terminal ileum spanning approximately 7 cm in length suspicious for active Crohn's flare. There is an adjacent ill-defined inflammatory mass in the central mesentery example coronal image 57 measuring up to 5.4 x 3.5 cm. There is suggestion of multiple complex emanating tracts versus developing complex fistula. Very faint foci of extraluminal gas example coronal image 60 series 4. Traversing loops of adjacent small bowel do not appear involved. No drainable collection. Submucosal fat deposition within the more proximal terminal ileum suggesting sequelae of prior inflammation. Mild fecalized distal small bowel. No dilated  loops of bowel to suggest obstruction. Normal appendix. Negative for abdominal aortic aneurysm. Prominent lymph nodes in the right lower quadrant central mesentery, consistent with reactive lymph nodes. No ascites. Soft tissues unremarkable. No acute displaced fracture or aggressive lesion.     Impression: 1. CT findings consistent with active Crohn's flare centered at the terminal ileum. There is an inflammatory mass  centered in the adjacent right lower quadrant mesentery with ill-defined tracts versus developing complex fistula and at least 1 tiny focus of extraluminal gas (coronal image 60 series 4). No drainable collection/abscess. No associated bowel obstruction. 2. 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 3. Hepatic steatosis. Electronically Signed: Kevin Quintana MD  4/24/2024 8:02 PM EDT  Workstation ID: [id number]        Current Medications     Scheduled Meds:  [START ON 4/25/2024] piperacillin-tazobactam, 3.375 g, Intravenous, Q8H  sodium chloride, 10 mL, Intravenous, Q12H         Continuous Infusions:  lactated ringers, 100 mL/hr  Pharmacy to Dose [facility]  04/24/24   22:17 EDT

## 2024-04-25 NOTE — NURSING NOTE
Patient arrived in [facility] from ED, admitted for abdominal pain. Plan of care initiated. Call light within reach.

## 2024-04-25 NOTE — PLAN OF CARE
Goal Outcome Evaluation:  Plan of Care Reviewed With: patient        Progress: no change  Outcome Evaluation: Patient alert and oriented, ambulates independently, NPO, IV fluids and abx.  Continuing plan of care, call light in reach.

## 2024-04-25 NOTE — CONSULTS
Colorectal Surgery Consultation Note    ID:  Carin Price;   : 1995  DATE OF VISIT: 2024    Chief Complaint  Abdominal Pain (Abd pain, pt states when she was lifting mats over her head she had stabbing pain to abd, became nauseated and vomited x 1.  Pt currently has some mild abd pain when walking./)       History of Present Illness  Carin Price is a 28 y.o. female who I was asked to see in consultation by Thien Mohan MD to evaluate for chron's ileitis.    Patient has been dealing with chron's disease since diagnosis in . She has been on Budesonide initially and was switched to Humira. Due to insurance issues she stopped using humira and had recent frequent flares. She had a recent hospital admission with abscess that was treated with IV abx. She recently received one dose humira biosimilar.     She presents to the ED with worsening with abdominal pain, nausea, and vomiting. She also reports diarrhea without any blood. Reports no flatus since 2 days ago. Denies any fever or chills.      Since admission, she has been slightly tachycardic, normotensive, and afebrile. Her WBC and reactive markers are slightly elevated. CT A/P shows terminal ileal thickening with few extraluminal air.      Past Medical History  Past Medical History:   Diagnosis Date    Crohn's disease     Psoriasis      Past Surgical History  Past Surgical History:   Procedure Laterality Date    LAPAROSCOPIC CHOLECYSTECTOMY      ROTATOR CUFF REPAIR Right      Family History  Family History   Problem Relation Age of Onset    No Known Problems Mother     No Known Problems Father     Cancer Maternal Grandmother      No colorectal cancer history in immediate family members.  Social History  Social History     Tobacco Use    Smoking status: Never   Vaping Use    Vaping status: Never Used   Substance Use Topics    Alcohol use: Not Currently    Drug use: Never     Medication List  @medcurrent@  Allergies  No Known Allergies  Review  of Systems  All systems reviewed and are otherwise negative, pertinent positives noted in the HPI.    Physical Exam  General:  No acute distress  Head: Normocephalic, atraumatic  Neuro: Alert and oriented    Abdomen:  Soft, mild diffuse tender, non-distended, no masses, no hernias, no guarding, no diffuse peritoneal sign.     Assessment  - chron's disease with acute on chronic terminal ileitis     Plan / Recommendations    - Her CT shows significant terminal ileal disease spanning over 9.6 cm. The bowel is distorted and significantly thickened with few extra luminal air. No drainable abscess collection    - Given her hemodynamic stability and no acute abdomen findings, no emergent surgical intervention is warranted. However, given the radiographic appearance and her ongoing symptoms, we have discussed surgical resection of the ileocolic region. We will have the discussion again to weigh risk and benefits.     - continue with bowel rest. Ok for sips of clears     - appreciate GI input. Management of immunosuppression per GI team.     - continue with antibiotics.     - strongly recommend DVT prophylaxis. Significantly elevated risk for DVT.     - I will continue with serial abdominal exam    I have also personally reviewed the labs which show slight leukocytosis and Hgb of 12.4, Cr 0.7.    I have also personally reviewed the CT scan images as documented above      Sachin Murphy MD  Colon and Rectal Surgery   [facility]

## 2024-04-26 ENCOUNTER — INPATIENT HOSPITAL (OUTPATIENT)
Dept: URBAN - METROPOLITAN AREA HOSPITAL 84 | Facility: HOSPITAL | Age: 29
End: 2024-04-26

## 2024-04-26 DIAGNOSIS — D72.829 ELEVATED WHITE BLOOD CELL COUNT, UNSPECIFIED: ICD-10-CM

## 2024-04-26 DIAGNOSIS — K76.0 FATTY (CHANGE OF) LIVER, NOT ELSEWHERE CLASSIFIED: ICD-10-CM

## 2024-04-26 DIAGNOSIS — R11.2 NAUSEA WITH VOMITING, UNSPECIFIED: ICD-10-CM

## 2024-04-26 DIAGNOSIS — K50.813 CROHN'S DISEASE OF BOTH SMALL AND LARGE INTESTINE WITH FISTU: ICD-10-CM

## 2024-04-26 LAB
ALBUMIN SERPL-MCNC: 3.5 G/DL (ref 3.5–5.2)
ALBUMIN/GLOB SERPL: 1.1 G/DL
ALP SERPL-CCNC: 87 U/L (ref 39–117)
ALT SERPL W P-5'-P-CCNC: 72 U/L (ref 1–33)
ANION GAP SERPL CALCULATED.3IONS-SCNC: 11 MMOL/L (ref 5–15)
AST SERPL-CCNC: 26 U/L (ref 1–32)
BASOPHILS # BLD AUTO: 0.03 10*3/MM3 (ref 0–0.2)
BASOPHILS NFR BLD AUTO: 0.2 % (ref 0–1.5)
BILIRUB SERPL-MCNC: 1.1 MG/DL (ref 0–1.2)
BUN SERPL-MCNC: 7 MG/DL (ref 6–20)
BUN/CREAT SERPL: 6.9 (ref 7–25)
CALCIUM SPEC-SCNC: 9 MG/DL (ref 8.6–10.5)
CHLORIDE SERPL-SCNC: 99 MMOL/L (ref 98–107)
CO2 SERPL-SCNC: 26 MMOL/L (ref 22–29)
CREAT SERPL-MCNC: 1.02 MG/DL (ref 0.57–1)
DEPRECATED RDW RBC AUTO: 42.3 FL (ref 37–54)
EGFRCR SERPLBLD CKD-EPI 2021: 77 ML/MIN/1.73
EOSINOPHIL # BLD AUTO: 0.17 10*3/MM3 (ref 0–0.4)
EOSINOPHIL NFR BLD AUTO: 1.3 % (ref 0.3–6.2)
ERYTHROCYTE [DISTWIDTH] IN BLOOD BY AUTOMATED COUNT: 13.4 % (ref 12.3–15.4)
GLOBULIN UR ELPH-MCNC: 3.3 GM/DL
GLUCOSE SERPL-MCNC: 81 MG/DL (ref 65–99)
HCT VFR BLD AUTO: 39.2 % (ref 34–46.6)
HGB BLD-MCNC: 12.5 G/DL (ref 12–15.9)
IMM GRANULOCYTES # BLD AUTO: 0.03 10*3/MM3 (ref 0–0.05)
IMM GRANULOCYTES NFR BLD AUTO: 0.2 % (ref 0–0.5)
LYMPHOCYTES # BLD AUTO: 3.11 10*3/MM3 (ref 0.7–3.1)
LYMPHOCYTES NFR BLD AUTO: 24.4 % (ref 19.6–45.3)
MAGNESIUM SERPL-MCNC: 2.3 MG/DL (ref 1.6–2.6)
MCH RBC QN AUTO: 27.7 PG (ref 26.6–33)
MCHC RBC AUTO-ENTMCNC: 31.9 G/DL (ref 31.5–35.7)
MCV RBC AUTO: 86.9 FL (ref 79–97)
MONOCYTES # BLD AUTO: 1.19 10*3/MM3 (ref 0.1–0.9)
MONOCYTES NFR BLD AUTO: 9.3 % (ref 5–12)
NEUTROPHILS NFR BLD AUTO: 64.6 % (ref 42.7–76)
NEUTROPHILS NFR BLD AUTO: 8.2 10*3/MM3 (ref 1.7–7)
NRBC BLD AUTO-RTO: 0 /100 WBC (ref 0–0.2)
PHOSPHATE SERPL-MCNC: 3.9 MG/DL (ref 2.5–4.5)
PLATELET # BLD AUTO: 370 10*3/MM3 (ref 140–450)
PMV BLD AUTO: 9 FL (ref 6–12)
POTASSIUM SERPL-SCNC: 3.9 MMOL/L (ref 3.5–5.2)
PROT SERPL-MCNC: 6.8 G/DL (ref 6–8.5)
RBC # BLD AUTO: 4.51 10*6/MM3 (ref 3.77–5.28)
SODIUM SERPL-SCNC: 136 MMOL/L (ref 136–145)
WBC NRBC COR # BLD AUTO: 12.73 10*3/MM3 (ref 3.4–10.8)

## 2024-04-26 PROCEDURE — 25010000002 HEPARIN (PORCINE) PER 1000 UNITS: Performed by: HOSPITALIST

## 2024-04-26 PROCEDURE — 25010000002 HYDROMORPHONE 1 MG/ML SOLUTION: Performed by: STUDENT IN AN ORGANIZED HEALTH CARE EDUCATION/TRAINING PROGRAM

## 2024-04-26 PROCEDURE — 25010000002 PIPERACILLIN SOD-TAZOBACTAM PER 1 G: Performed by: STUDENT IN AN ORGANIZED HEALTH CARE EDUCATION/TRAINING PROGRAM

## 2024-04-26 PROCEDURE — 80053 COMPREHEN METABOLIC PANEL: CPT | Performed by: STUDENT IN AN ORGANIZED HEALTH CARE EDUCATION/TRAINING PROGRAM

## 2024-04-26 PROCEDURE — 99232 SBSQ HOSP IP/OBS MODERATE 35: CPT | Performed by: NURSE PRACTITIONER

## 2024-04-26 PROCEDURE — 85025 COMPLETE CBC W/AUTO DIFF WBC: CPT | Performed by: HOSPITALIST

## 2024-04-26 PROCEDURE — 99233 SBSQ HOSP IP/OBS HIGH 50: CPT | Performed by: STUDENT IN AN ORGANIZED HEALTH CARE EDUCATION/TRAINING PROGRAM

## 2024-04-26 PROCEDURE — 83735 ASSAY OF MAGNESIUM: CPT | Performed by: HOSPITALIST

## 2024-04-26 PROCEDURE — 25010000002 ONDANSETRON PER 1 MG: Performed by: STUDENT IN AN ORGANIZED HEALTH CARE EDUCATION/TRAINING PROGRAM

## 2024-04-26 PROCEDURE — 36415 COLL VENOUS BLD VENIPUNCTURE: CPT | Performed by: STUDENT IN AN ORGANIZED HEALTH CARE EDUCATION/TRAINING PROGRAM

## 2024-04-26 PROCEDURE — 25010000002 PROCHLORPERAZINE 10 MG/2ML SOLUTION: Performed by: STUDENT IN AN ORGANIZED HEALTH CARE EDUCATION/TRAINING PROGRAM

## 2024-04-26 PROCEDURE — 84100 ASSAY OF PHOSPHORUS: CPT | Performed by: HOSPITALIST

## 2024-04-26 RX ORDER — HEPARIN SODIUM 5000 [USP'U]/ML
5000 INJECTION, SOLUTION INTRAVENOUS; SUBCUTANEOUS EVERY 8 HOURS SCHEDULED
Status: DISCONTINUED | OUTPATIENT
Start: 2024-04-26 | End: 2024-04-29 | Stop reason: HOSPADM

## 2024-04-26 RX ADMIN — HYDROMORPHONE HYDROCHLORIDE 0.5 MG: 1 INJECTION, SOLUTION INTRAMUSCULAR; INTRAVENOUS; SUBCUTANEOUS at 23:17

## 2024-04-26 RX ADMIN — ONDANSETRON 4 MG: 2 INJECTION INTRAMUSCULAR; INTRAVENOUS at 23:17

## 2024-04-26 RX ADMIN — HYDROMORPHONE HYDROCHLORIDE 0.5 MG: 1 INJECTION, SOLUTION INTRAMUSCULAR; INTRAVENOUS; SUBCUTANEOUS at 17:24

## 2024-04-26 RX ADMIN — Medication 10 ML: at 20:42

## 2024-04-26 RX ADMIN — HYDROMORPHONE HYDROCHLORIDE 0.5 MG: 1 INJECTION, SOLUTION INTRAMUSCULAR; INTRAVENOUS; SUBCUTANEOUS at 04:59

## 2024-04-26 RX ADMIN — ONDANSETRON 4 MG: 2 INJECTION INTRAMUSCULAR; INTRAVENOUS at 17:22

## 2024-04-26 RX ADMIN — HEPARIN SODIUM 5000 UNITS: 5000 INJECTION INTRAVENOUS; SUBCUTANEOUS at 21:05

## 2024-04-26 RX ADMIN — HEPARIN SODIUM 5000 UNITS: 5000 INJECTION INTRAVENOUS; SUBCUTANEOUS at 14:20

## 2024-04-26 RX ADMIN — PIPERACILLIN AND TAZOBACTAM 3.38 G: 3; .375 INJECTION, POWDER, FOR SOLUTION INTRAVENOUS at 04:59

## 2024-04-26 RX ADMIN — PIPERACILLIN AND TAZOBACTAM 3.38 G: 3; .375 INJECTION, POWDER, FOR SOLUTION INTRAVENOUS at 21:05

## 2024-04-26 RX ADMIN — ONDANSETRON 4 MG: 2 INJECTION INTRAMUSCULAR; INTRAVENOUS at 05:05

## 2024-04-26 RX ADMIN — HYDROMORPHONE HYDROCHLORIDE 0.5 MG: 1 INJECTION, SOLUTION INTRAMUSCULAR; INTRAVENOUS; SUBCUTANEOUS at 20:42

## 2024-04-26 RX ADMIN — Medication 10 ML: at 10:05

## 2024-04-26 RX ADMIN — PIPERACILLIN AND TAZOBACTAM 3.38 G: 3; .375 INJECTION, POWDER, FOR SOLUTION INTRAVENOUS at 14:20

## 2024-04-26 RX ADMIN — PROCHLORPERAZINE EDISYLATE 10 MG: 5 INJECTION INTRAMUSCULAR; INTRAVENOUS at 20:42

## 2024-04-26 RX ADMIN — HYDROMORPHONE HYDROCHLORIDE 0.5 MG: 1 INJECTION, SOLUTION INTRAMUSCULAR; INTRAVENOUS; SUBCUTANEOUS at 00:41

## 2024-04-26 NOTE — CONSULTS
"Nutrition Services    Patient Name: Carin Price  YOB: 1995  MRN: [id number]  Admission date: 4/24/2024    Comment:    RD to order Boost Plus BID - chocolate (Provides 720 kcals, 28 g protein if consumed). Boost Glucose Control may be substituted for Boost Plus at this time, due to national shortage of many ONS products. If substituted, each Boost Glucose Control will provide 190 kcal and 16g PRO.    Will continue to monitor.    CLINICAL NUTRITION ASSESSMENT      Reason for Assessment 4/26 - MST     H&P      Past Medical History:   Diagnosis Date    Crohn's disease     Psoriasis        Past Surgical History:   Procedure Laterality Date    LAPAROSCOPIC CHOLECYSTECTOMY      ROTATOR CUFF REPAIR Right         Current Problems   Acute Crohn's flare-up  - GI following  - advanced to full liquids - tolerating    Mild JALEEL  - increase IV fluids        Encounter Information        Trending Narrative     4/26 - Pt admitted to [facility] 4/24 for abdominal pain, nausea and vomiting. Dietetic intern visited patient at bedside - patient has been tolerating advancement to full liquid diet. Denies chewing/swallowing issues and food allergies. Pt reports a UBW of 250 lbs with some recent weight loss. Pt accepted offer of Boost - brought one to bedside, prefers chocolate. RD to order Boost Plus BID (Provides 720 kcals, 28 g protein if consumed). Boost Glucose Control may be substituted for Boost Plus at this time, due to national shortage of many ONS products. If substituted, each Boost Glucose Control will provide 190 kcal and 16g PRO. NFPE completed and not consistent with nutrition diagnosis of malnutrition at this time using AND/ASPEN criteria. Will continue to monitor.     Anthropometrics        Current Height, Weight Height: 170.2 cm (67\")  Weight: 112 kg (246 lb 7.6 oz) (04/26/24 0503)       Usual Body Weight (UBW) 250 lbs       Trending Weight Hx     This admission: 4/26 - 246 lbs             PTA: 3.9% weight loss x " "3 weeks (10 lb weight loss per patient - 256 lbs)    Wt Readings from Last 30 Encounters:   04/26/24 0503 112 kg (246 lb 7.6 oz)   04/24/24 2214 113 kg (250 lb)   04/24/24 1755 113 kg (250 lb)   08/13/21 0312 104 kg (229 lb 4.5 oz)   08/12/21 0517 104 kg (230 lb)   08/11/21 0414 104 kg (230 lb 3.2 oz)   08/11/21 0100 103 kg (227 lb 14.4 oz)      BMI kg/m2 Body mass index is 38.6 kg/m².       Labs        Pertinent Labs Reviewed. Management per attending.   Results from last 7 days   Lab Units 04/26/24  0154 04/25/24  0019 04/24/24  1816   SODIUM mmol/L 136 138 136   POTASSIUM mmol/L 3.9 3.2* 3.4*   CHLORIDE mmol/L 99 103 100   CO2 mmol/L 26.0 24.0 26.0   BUN mg/dL 7 11 13   CREATININE mg/dL 1.02* 0.73 0.78   CALCIUM mg/dL 9.0 8.8 9.4   BILIRUBIN mg/dL 1.1 0.6 0.3   ALK PHOS U/L 87 85 92   ALT (SGPT) U/L 72* 69* 77*   AST (SGOT) U/L 26 23 24   GLUCOSE mg/dL 81 83 83     Results from last 7 days   Lab Units 04/26/24  0154   MAGNESIUM mg/dL 2.3   PHOSPHORUS mg/dL 3.9   HEMOGLOBIN g/dL 12.5   HEMATOCRIT % 39.2     No results found for: \"HGBA1C\"     Medications    Scheduled Medications heparin (porcine), 5,000 Units, Subcutaneous, Q8H  piperacillin-tazobactam, 3.375 g, Intravenous, Q8H  sodium chloride, 10 mL, Intravenous, Q12H        Infusions lactated ringers, 150 mL/hr, Last Rate: 150 mL/hr (04/26/24 1004)  Pharmacy to Dose Zosyn,         PRN Medications   Calcium Replacement - Follow Nurse / BPA Driven Protocol    HYDROmorphone    Magnesium Standard Dose Replacement - Follow Nurse / BPA Driven Protocol    nitroglycerin    ondansetron ODT **OR** ondansetron    Pharmacy to Dose Zosyn    Phosphorus Replacement - Follow Nurse / BPA Driven Protocol    Potassium Replacement - Follow Nurse / BPA Driven Protocol    prochlorperazine    [COMPLETED] Insert Peripheral IV **AND** sodium chloride    sodium chloride    sodium chloride     Physical Findings        Trending Physical   Appearance, NFPE 4/26 - NFPE completed and not " consistent with nutrition diagnosis of malnutrition at this time using AND/ASPEN criteria      --  Edema  No documented edema     Bowel Function + BM 4/23      Tubes No feeding tube in place     Chewing/Swallowing No documented chewing/swallowing issues     Skin No PIs      --  Current Nutrition Orders & Evaluation of Intake       Oral Nutrition     Food Allergies NKFA   Current PO Diet Diet: Liquid; Full Liquid; Fluid Consistency: Thin (IDDSI 0)   Supplement No supplement ordered   PO Evaluation     Trending % PO Intake 4/26 - 25% PO intake x 1 meal since full liquid diet in place   --  Nutritional Risk Screening        NRS-2002 Score          Nutrition Diagnosis         Nutrition Dx Problem 1 Altered GI function related to flare-up of Crohn's disease as evidenced by nausea/vomiting and need for full liquid diet.      Nutrition Dx Problem 2        Intervention Goal         Intervention Goal(s) Encourage PO intake > 50%   Acceptance of ONS     Nutrition Intervention        RD Action NFPE completed  Continue full liquid diet as tolerated, diet advancement per GI  Continue to monitor  Order Boost Plus BID     Nutrition Prescription          Diet Prescription Full liquid   Supplement Prescription Boost Plus BID - chocolate(Provides 720 kcals, 28 g protein if consumed). Boost Glucose Control may be substituted for Boost Plus at this time, due to national shortage of many ONS products. If substituted, each Boost Glucose Control will provide 190 kcal and 16g PRO.     --  Monitor/Evaluation        Monitor Per protocol, PO intake, Supplement intake, Pertinent labs, Weight, GI status, Symptoms     Electronically signed by:  Renee Chisholm  04/26/24 13:08 EDT

## 2024-04-26 NOTE — PROGRESS NOTES
Colorectal Surgery Progress Note    Pt. Name/Age/:  Carin Price   28 y.o.    1995         Med. Record Number:   [id number]  Date of admission:  2024      Service(s): Colorectal Surgery      Subjective    Reports improvement regarding nausea and pain   She reports some return of appetite   Has been afebrile and hemodynamically wnl   Getting out of bed     Objective  Vitals:     Patient Vitals for the past 24 hrs:   BP Temp Temp src Pulse Resp SpO2 Weight   24 0503 -- -- -- -- -- -- 112 kg (246 lb 7.6 oz)   24 0454 136/78 98.1 °F (36.7 °C) -- -- -- (!) 89 % --   24 0024 117/68 98.6 °F (37 °C) -- 103 16 98 % --   24 1951 128/85 98.8 °F (37.1 °C) -- 78 -- 99 % --   24 1658 97/69 97.7 °F (36.5 °C) Oral -- 18 96 % --   24 1207 128/79 98.2 °F (36.8 °C) Oral 82 17 95 % --           Wt. Admission: Weight: 113 kg (250 lb)     Wt. Current: Weight: 112 kg (246 lb 7.6 oz)   Body mass index is 38.6 kg/m².      I&O:  Intake/Output Summary (Last 24 hours) at 2024 0827  Last data filed at 2024 0507  Gross per 24 hour   Intake --   Output 1900 ml   Net -1900 ml      190 -  0700  In: 506.7 [I.V.:506.7]  Out: 2400 [Urine:2400]      Exam  General:  No acute distress  Head: Normocephalic, atraumatic  Neuro: Alert and oriented     Abdomen:  Soft, mild tender on right upper quadrant, non-distended, no masses, no hernias, no guarding, no diffuse peritoneal sign.       Labs:     [unfilled]          Scheduled Meds:piperacillin-tazobactam, 3.375 g, Intravenous, Q8H  sodium chloride, 10 mL, Intravenous, Q12H      Continuous Infusions:lactated ringers, 100 mL/hr, Last Rate: 100 mL/hr (24 2255)  Pharmacy to Dose Zosyn,       PRN Meds:.  Calcium Replacement - Follow Nurse / BPA Driven Protocol    HYDROmorphone    Magnesium Standard Dose Replacement - Follow Nurse / BPA Driven Protocol    nitroglycerin    ondansetron ODT **OR** ondansetron    Pharmacy to Dose Zosyn     Phosphorus Replacement - Follow Nurse / BPA Driven Protocol    Potassium Replacement - Follow Nurse / BPA Driven Protocol    prochlorperazine    [COMPLETED] Insert Peripheral IV **AND** sodium chloride    sodium chloride    sodium chloride          Assessment  - chron's disease with acute on chronic terminal ileitis        Plan / Recommendations    - showing some improvements but still having pain and intermittent nausea     - ok for clear liquid diet from surgical stand point     - if symptoms dont improve, she will need surgical intervention during this visit but hoping she improves for possible elective minimal invasive approach if surgery is warranted     - appreciate GI input. Management of immunosuppression per GI team.      - continue with antibiotics.      - strongly recommend DVT prophylaxis. Significantly elevated risk for DVT.      - I will continue with serial abdominal exam    08:27 EDT; 4/26/2024        Sachin Murphy MD  Colon and Rectal Surgery   [facility]

## 2024-04-26 NOTE — PLAN OF CARE
Goal Outcome Evaluation:              Outcome Evaluation: Pt has had c/o pain treated per MAR, vss, NPO, iv abx continue, ambulates independently, call light in reach, plan of care ongoing

## 2024-04-26 NOTE — CASE MANAGEMENT/SOCIAL WORK
Continued Stay Note   [facility]     Patient Name: Carin Price  MRN: [id number]  Today's Date: 4/26/2024    Admit Date: 4/24/2024    Plan: Plan to return home with spouse pending clinical course.   Discharge Plan       Row Name 04/26/24 1058       Plan    Plan Plan to return home with spouse pending clinical course.    Plan Comments DC Barriers:  WBC 12.7, blood cultures pending, IV Abxs, IV pain meds, clear liq diet, Colorectal/GI following.               Expected Discharge Date and Time       Expected Discharge Date Expected Discharge Time    Apr 29, 2024               JERSEY Hugo RN  SIPS/ICU   O: [phone]  C: [phone]  [email or web address]

## 2024-04-26 NOTE — PROGRESS NOTES
[facility] MEDICINE SERVICE  DAILY PROGRESS NOTE    NAME: Carin Price  : 1995  MRN: [id number]      LOS: 1 day     PROVIDER OF SERVICE: Carmelo Richards MD    Chief Complaint: Crohn disease    Subjective:     Interval History:  History taken from: patient chart RN  Pain controlled no nausea    Review of Systems:   Review of Systems  All negative except as above  Objective:     Vital Signs  Temp:  [97.7 °F (36.5 °C)-98.8 °F (37.1 °C)] 98.4 °F (36.9 °C)  Heart Rate:  [] 92  Resp:  [16-18] 16  BP: ()/(68-91) 136/91   Body mass index is 38.6 kg/m².    Physical Exam  Physical Exam  AOx3 NAD  RRR S1 and S2  Lungs CTA  Abdomen soft nontender nondistended  Scheduled Meds   heparin (porcine), 5,000 Units, Subcutaneous, Q8H  piperacillin-tazobactam, 3.375 g, Intravenous, Q8H  sodium chloride, 10 mL, Intravenous, Q12H       PRN Meds     Calcium Replacement - Follow Nurse / BPA Driven Protocol    HYDROmorphone    Magnesium Standard Dose Replacement - Follow Nurse / BPA Driven Protocol    nitroglycerin    ondansetron ODT **OR** ondansetron    Pharmacy to Dose Zosyn    Phosphorus Replacement - Follow Nurse / BPA Driven Protocol    Potassium Replacement - Follow Nurse / BPA Driven Protocol    prochlorperazine    [COMPLETED] Insert Peripheral IV **AND** sodium chloride    sodium chloride    sodium chloride   Infusions  lactated ringers, 150 mL/hr, Last Rate: 150 mL/hr (24 1004)  Pharmacy to Dose Zosyn,           Diagnostic Data    Results from last 7 days   Lab Units 24  0154   WBC 10*3/mm3 12.73*   HEMOGLOBIN g/dL 12.5   HEMATOCRIT % 39.2   PLATELETS 10*3/mm3 370   GLUCOSE mg/dL 81   CREATININE mg/dL 1.02*   BUN mg/dL 7   SODIUM mmol/L 136   POTASSIUM mmol/L 3.9   AST (SGOT) U/L 26   ALT (SGPT) U/L 72*   ALK PHOS U/L 87   BILIRUBIN mg/dL 1.1   ANION GAP mmol/L 11.0       CT Abdomen Pelvis With Contrast    Result Date: 2024  Impression: 1. CT findings consistent with active Crohn's  flare centered at the terminal ileum. There is an inflammatory mass centered in the adjacent right lower quadrant mesentery with ill-defined tracts versus developing complex fistula and at least 1 tiny focus of extraluminal gas (coronal image 60 series 4). No drainable collection/abscess. No associated bowel obstruction. 2. 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 3. Hepatic steatosis. Electronically Signed: Kevin Quintana MD  4/24/2024 8:02 PM EDT  Workstation ID: [id number]       I reviewed the patient's new clinical results.  I reviewed the patient's new imaging results and agree with the interpretation.    Assessment/Plan:     Active and Resolved Problems  Active Hospital Problems    Diagnosis  POA    **Crohn disease [K50.90]  Yes      Resolved Hospital Problems   No resolved problems to display.       28-year-old female with history of Crohn's disease previously on Humira admitted to [facility] 4/24 with worsening right lower quadrant abdominal pain     #Acute Crohn's flare  Previously on Humira however due to insurance issues transitioned to Hyrimoz (adalimumab-adaz). Last dose 2 weeks ago  CT abdomen pelvis consistent with acute Crohn's flare centered at terminal ileum.  There is inflammatory mass centered in the adjacent right lower quadrant mesentery with ill-defined tracts versus developing complex fistula  GI and CRS following.  Noted plan to monitor on IV antibiotics  On IV Zosyn  Continue IV fluids  Continue current pain control Zofran as needed  Follow infectious workup  Advance diet per surgery    #Mild JALEEL  Creatinine increased to 1.02  Increase IV fluids  BMP daily    #Electrolyte abnormalities  Replace recheck     #Incidental finding  CT abdomen pelvis showed mixed density 5.5 cm right ovarian lesion containing macroscopic fat, soft tissue density and calcifications compatible with ovarian dermoid     Outpatient follow-up with PCP    DVT  prophylaxis:  Medical and mechanical DVT prophylaxis orders are present.         Code status is   Code Status and Medical Interventions:   Ordered at: 04/24/24 2213     Code Status (Patient has no pulse and is not breathing):    CPR (Attempt to Resuscitate)     Medical Interventions (Patient has pulse or is breathing):    Full Support       Plan for disposition: Anticipate discharge in 2 days    Time: 30 minutes    Signature: Electronically signed by Carmelo Richards MD, 04/26/24, 13:12 EDT.  [facility] Hospitalist Team

## 2024-04-26 NOTE — PLAN OF CARE
Goal Outcome Evaluation:   VSS. Aox4. Up ad aidee. Pain treated once today w/dialudid per pt request. Nausea treated w/zofran per MAR. Pt tolerating full liquids. Pt is hoping to go home tomorrow. Safety measures in place. Care plan ongoing.

## 2024-04-26 NOTE — PROGRESS NOTES
" LOS: 1 day   Patient Care Team:  Thien Reagan MD as PCP - General (Gastroenterology)      Subjective   \" I am good.  Hungry.\"    Interval History:   Labs: Sodium potassium are normal.  Creatinine 1.02.  LFTs normal except for ALT at 72.  WBCs down to 12.73, hemoglobin 12.5, platelets 370.  No bowel movement.  Is passing gas.  Occasional sharp pains in the right upper quadrant.    ROS:   No chest pain, shortness of breath, or cough.         Medication Review:     Current Facility-Administered Medications:     Calcium Replacement - Follow Nurse / BPA Driven Protocol, , Does not apply, PRN, Dana Agudelo DO    heparin (porcine) 5000 UNIT/ML injection 5,000 Units, 5,000 Units, Subcutaneous, Q8H, Carmelo Richards MD    HYDROmorphone (DILAUDID) injection 0.5 mg, 0.5 mg, Intravenous, Q2H PRN, Dana Agudelo DO, 0.5 mg at 04/26/24 0459    lactated ringers infusion, 150 mL/hr, Intravenous, Continuous, Carmelo Richards MD, Last Rate: 150 mL/hr at 04/26/24 1004, 150 mL/hr at 04/26/24 1004    Magnesium Standard Dose Replacement - Follow Nurse / BPA Driven Protocol, , Does not apply, PRN, Dana Agudelo DO    nitroglycerin (NITROSTAT) SL tablet 0.4 mg, 0.4 mg, Sublingual, Q5 Min PRN, Dana Agudelo DO    ondansetron ODT (ZOFRAN-ODT) disintegrating tablet 4 mg, 4 mg, Oral, Q6H PRN **OR** ondansetron (ZOFRAN) injection 4 mg, 4 mg, Intravenous, Q6H PRN, Dana Agudelo DO, 4 mg at 04/26/24 0505    Pharmacy to Dose Zosyn, , Does not apply, Continuous PRN, Dana Agudelo DO    Phosphorus Replacement - Follow Nurse / BPA Driven Protocol, , Does not apply, PRN, Dana Agudelo DO    piperacillin-tazobactam (ZOSYN) 3.375 g IVPB in 100 mL NS MBP (CD), 3.375 g, Intravenous, Q8H, Dana Agudelo DO, 3.375 g at 04/26/24 0459    Potassium Replacement - Follow Nurse / BPA Driven Protocol, , Does not apply, PRN, Dana Agudelo DO    prochlorperazine (COMPAZINE) injection 10 mg, 10 mg, Intravenous, Q6H PRN, Jammie, " DO Dana, 10 mg at 04/25/24 2119    [COMPLETED] Insert Peripheral IV, , , Once **AND** sodium chloride 0.9 % flush 10 mL, 10 mL, Intravenous, PRN, Dana Agudelo DO    sodium chloride 0.9 % flush 10 mL, 10 mL, Intravenous, Q12H, Dana Agudelo DO, 10 mL at 04/26/24 1005    sodium chloride 0.9 % flush 10 mL, 10 mL, Intravenous, PRN, Dana Agudelo DO    sodium chloride 0.9 % infusion 40 mL, 40 mL, Intravenous, PRN, Dana Agudelo DO      Objective resting in bed.  NAD.    Vital Signs  Temp:  [97.7 °F (36.5 °C)-98.8 °F (37.1 °C)] 98.4 °F (36.9 °C)  Heart Rate:  [] 92  Resp:  [16-18] 16  BP: ()/(68-91) 136/91  Physical Exam:    General Appearance:    Awake and alert, in no acute distress   Head:    Normocephalic, without obvious abnormality   Eyes:          Conjunctivae normal, anicteric sclerae   Ears:    Hearing intact   Throat:   No oral lesions, no thrush, oral mucosa moist   Neck:   No adenopathy, supple, no JVD   Lungs:      respirations regular, even and unlabored        Abdomen:      soft, non-tender, no rebound or guarding, non-distended, no hepatosplenomegaly   Rectal:     Deferred   Extremities:   No edema, no cyanosis, no redness   Skin:   No bleeding, bruising or rash, no jaundice   Neurologic:   Cranial nerves 2 - 12 grossly intact, no asterixis, sensation   intact        Results Review:    CBC    Results from last 7 days   Lab Units 04/26/24  0154 04/25/24  0019 04/24/24  1816   WBC 10*3/mm3 12.73* 14.09* 13.71*   HEMOGLOBIN g/dL 12.5 12.4 13.4   PLATELETS 10*3/mm3 370 378 438     CMP   Results from last 7 days   Lab Units 04/26/24  0154 04/25/24  0019 04/24/24  1816   SODIUM mmol/L 136 138 136   POTASSIUM mmol/L 3.9 3.2* 3.4*   CHLORIDE mmol/L 99 103 100   CO2 mmol/L 26.0 24.0 26.0   BUN mg/dL 7 11 13   CREATININE mg/dL 1.02* 0.73 0.78   GLUCOSE mg/dL 81 83 83   ALBUMIN g/dL 3.5 3.5 4.0   BILIRUBIN mg/dL 1.1 0.6 0.3   ALK PHOS U/L 87 85 92   AST (SGOT) U/L 26 23 24   ALT (SGPT)  "U/L 72* 69* 77*   MAGNESIUM mg/dL 2.3  --   --    PHOSPHORUS mg/dL 3.9  --   --    LIPASE U/L  --   --  31     Cr Clearance Estimated Creatinine Clearance: 106 mL/min (A) (by C-G formula based on SCr of 1.02 mg/dL (H)).  Coag     HbA1C No results found for: \"HGBA1C\"  Blood Glucose No results found for: \"POCGLU\"  Infection   Results from last 7 days   Lab Units 04/24/24  2127   BLOODCX  No growth at 24 hours  No growth at 24 hours     UA    Results from last 7 days   Lab Units 04/24/24  1816   NITRITE UA  Negative     Radiology(recent) CT Abdomen Pelvis With Contrast    Result Date: 4/24/2024  Impression: 1. CT findings consistent with active Crohn's flare centered at the terminal ileum. There is an inflammatory mass centered in the adjacent right lower quadrant mesentery with ill-defined tracts versus developing complex fistula and at least 1 tiny focus of extraluminal gas (coronal image 60 series 4). No drainable collection/abscess. No associated bowel obstruction. 2. 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 3. Hepatic steatosis. Electronically Signed: Kevin Quintana MD  4/24/2024 8:02 PM EDT  Workstation ID: [id number]           Assessment & Plan   28-year-old female with ileocolonic Crohn's disease previously on Humira but lost insurance coverage in January 2024 and off medication presented 4/24/2024 with worsening abdominal pain and CT suggests inflammatory changes at the terminal ileum with microperforation and fistula.     Crohn's exacerbation with inflammation at terminal ileum/microperforation/fistula  Right-sided abdominal pain  Nausea/vomiting  Leukocytosis  Previous Crohn's related abscess  Elevated ALT/hepatic steatosis         Plan:  28-year-old female with history of Crohn's and previously on Humira lost insurance coverage January 2024 was off medication.  Symptoms have been flaring over the past few months.  She has completed prednisone taper as of " yesterday and just 2 weeks ago started Humira biosimilar.  She has history of Crohn's related abscess and was hospitalized in the past. Current CT shows active Crohn's in the terminal ileum with inflammatory mass and microperforation as well as developing complex fistula.     Feeling better and has an appetite today.  Will increase diet to full liquids.  Colorectal surgery consult noted.  Continue to hold [employer].  Continue Zosyn.        Chika Elliott, APRN  04/26/24  13:46 EDT

## 2024-04-27 ENCOUNTER — APPOINTMENT (OUTPATIENT)
Dept: GENERAL RADIOLOGY | Facility: HOSPITAL | Age: 29
DRG: 386 | End: 2024-04-27
Payer: COMMERCIAL

## 2024-04-27 ENCOUNTER — INPATIENT HOSPITAL (OUTPATIENT)
Dept: URBAN - METROPOLITAN AREA HOSPITAL 84 | Facility: HOSPITAL | Age: 29
End: 2024-04-27

## 2024-04-27 DIAGNOSIS — K50.013 CROHN'S DISEASE OF SMALL INTESTINE WITH FISTULA: ICD-10-CM

## 2024-04-27 DIAGNOSIS — K76.0 FATTY (CHANGE OF) LIVER, NOT ELSEWHERE CLASSIFIED: ICD-10-CM

## 2024-04-27 DIAGNOSIS — R11.2 NAUSEA WITH VOMITING, UNSPECIFIED: ICD-10-CM

## 2024-04-27 DIAGNOSIS — D72.829 ELEVATED WHITE BLOOD CELL COUNT, UNSPECIFIED: ICD-10-CM

## 2024-04-27 DIAGNOSIS — R10.11 RIGHT UPPER QUADRANT PAIN: ICD-10-CM

## 2024-04-27 LAB
ALBUMIN SERPL-MCNC: 3.2 G/DL (ref 3.5–5.2)
ALBUMIN/GLOB SERPL: 1 G/DL
ALP SERPL-CCNC: 101 U/L (ref 39–117)
ALT SERPL W P-5'-P-CCNC: 61 U/L (ref 1–33)
ANION GAP SERPL CALCULATED.3IONS-SCNC: 11 MMOL/L (ref 5–15)
AST SERPL-CCNC: 21 U/L (ref 1–32)
BILIRUB SERPL-MCNC: 1.1 MG/DL (ref 0–1.2)
BUN SERPL-MCNC: 5 MG/DL (ref 6–20)
BUN/CREAT SERPL: 6.3 (ref 7–25)
CALCIUM SPEC-SCNC: 8.7 MG/DL (ref 8.6–10.5)
CHLORIDE SERPL-SCNC: 103 MMOL/L (ref 98–107)
CO2 SERPL-SCNC: 24 MMOL/L (ref 22–29)
CREAT SERPL-MCNC: 0.8 MG/DL (ref 0.57–1)
CRP SERPL-MCNC: 13.06 MG/DL (ref 0–0.5)
DEPRECATED RDW RBC AUTO: 41.3 FL (ref 37–54)
EGFRCR SERPLBLD CKD-EPI 2021: 103.1 ML/MIN/1.73
ERYTHROCYTE [DISTWIDTH] IN BLOOD BY AUTOMATED COUNT: 13.2 % (ref 12.3–15.4)
GLOBULIN UR ELPH-MCNC: 3.2 GM/DL
GLUCOSE SERPL-MCNC: 97 MG/DL (ref 65–99)
HAV IGM SERPL QL IA: NORMAL
HBV CORE IGM SERPL QL IA: NORMAL
HBV SURFACE AG SERPL QL IA: NORMAL
HCT VFR BLD AUTO: 35.4 % (ref 34–46.6)
HCV AB SER QL: NORMAL
HGB BLD-MCNC: 11.6 G/DL (ref 12–15.9)
MAGNESIUM SERPL-MCNC: 2.3 MG/DL (ref 1.6–2.6)
MCH RBC QN AUTO: 28.3 PG (ref 26.6–33)
MCHC RBC AUTO-ENTMCNC: 32.8 G/DL (ref 31.5–35.7)
MCV RBC AUTO: 86.3 FL (ref 79–97)
PHOSPHATE SERPL-MCNC: 3.2 MG/DL (ref 2.5–4.5)
PLATELET # BLD AUTO: 335 10*3/MM3 (ref 140–450)
PMV BLD AUTO: 8.8 FL (ref 6–12)
POTASSIUM SERPL-SCNC: 4 MMOL/L (ref 3.5–5.2)
PROT SERPL-MCNC: 6.4 G/DL (ref 6–8.5)
RBC # BLD AUTO: 4.1 10*6/MM3 (ref 3.77–5.28)
SODIUM SERPL-SCNC: 138 MMOL/L (ref 136–145)
WBC NRBC COR # BLD AUTO: 10.08 10*3/MM3 (ref 3.4–10.8)

## 2024-04-27 PROCEDURE — 80053 COMPREHEN METABOLIC PANEL: CPT | Performed by: STUDENT IN AN ORGANIZED HEALTH CARE EDUCATION/TRAINING PROGRAM

## 2024-04-27 PROCEDURE — 25010000002 KETOROLAC TROMETHAMINE PER 15 MG: Performed by: NURSE PRACTITIONER

## 2024-04-27 PROCEDURE — 25010000002 PROCHLORPERAZINE 10 MG/2ML SOLUTION: Performed by: STUDENT IN AN ORGANIZED HEALTH CARE EDUCATION/TRAINING PROGRAM

## 2024-04-27 PROCEDURE — 86480 TB TEST CELL IMMUN MEASURE: CPT | Performed by: HOSPITALIST

## 2024-04-27 PROCEDURE — 83735 ASSAY OF MAGNESIUM: CPT | Performed by: HOSPITALIST

## 2024-04-27 PROCEDURE — 25010000002 ONDANSETRON PER 1 MG: Performed by: STUDENT IN AN ORGANIZED HEALTH CARE EDUCATION/TRAINING PROGRAM

## 2024-04-27 PROCEDURE — 80074 ACUTE HEPATITIS PANEL: CPT | Performed by: NURSE PRACTITIONER

## 2024-04-27 PROCEDURE — 85027 COMPLETE CBC AUTOMATED: CPT | Performed by: NURSE PRACTITIONER

## 2024-04-27 PROCEDURE — 25810000003 LACTATED RINGERS PER 1000 ML: Performed by: HOSPITALIST

## 2024-04-27 PROCEDURE — 99232 SBSQ HOSP IP/OBS MODERATE 35: CPT | Performed by: NURSE PRACTITIONER

## 2024-04-27 PROCEDURE — 25010000002 HEPARIN (PORCINE) PER 1000 UNITS: Performed by: HOSPITALIST

## 2024-04-27 PROCEDURE — 99232 SBSQ HOSP IP/OBS MODERATE 35: CPT | Performed by: STUDENT IN AN ORGANIZED HEALTH CARE EDUCATION/TRAINING PROGRAM

## 2024-04-27 PROCEDURE — 86140 C-REACTIVE PROTEIN: CPT | Performed by: NURSE PRACTITIONER

## 2024-04-27 PROCEDURE — 86704 HEP B CORE ANTIBODY TOTAL: CPT | Performed by: NURSE PRACTITIONER

## 2024-04-27 PROCEDURE — 25010000002 HYDROMORPHONE 1 MG/ML SOLUTION: Performed by: STUDENT IN AN ORGANIZED HEALTH CARE EDUCATION/TRAINING PROGRAM

## 2024-04-27 PROCEDURE — 84100 ASSAY OF PHOSPHORUS: CPT | Performed by: HOSPITALIST

## 2024-04-27 PROCEDURE — 25010000002 PIPERACILLIN SOD-TAZOBACTAM PER 1 G: Performed by: STUDENT IN AN ORGANIZED HEALTH CARE EDUCATION/TRAINING PROGRAM

## 2024-04-27 PROCEDURE — 74018 RADEX ABDOMEN 1 VIEW: CPT

## 2024-04-27 RX ORDER — DICYCLOMINE HYDROCHLORIDE 10 MG/1
20 CAPSULE ORAL 3 TIMES DAILY
Status: DISCONTINUED | OUTPATIENT
Start: 2024-04-27 | End: 2024-04-29 | Stop reason: HOSPADM

## 2024-04-27 RX ORDER — KETOROLAC TROMETHAMINE 30 MG/ML
30 INJECTION, SOLUTION INTRAMUSCULAR; INTRAVENOUS ONCE
Status: COMPLETED | OUTPATIENT
Start: 2024-04-27 | End: 2024-04-27

## 2024-04-27 RX ORDER — HYDROCODONE BITARTRATE AND ACETAMINOPHEN 5; 325 MG/1; MG/1
1 TABLET ORAL EVERY 6 HOURS PRN
Status: DISCONTINUED | OUTPATIENT
Start: 2024-04-27 | End: 2024-04-29 | Stop reason: HOSPADM

## 2024-04-27 RX ADMIN — HEPARIN SODIUM 5000 UNITS: 5000 INJECTION INTRAVENOUS; SUBCUTANEOUS at 13:11

## 2024-04-27 RX ADMIN — HYDROMORPHONE HYDROCHLORIDE 0.5 MG: 1 INJECTION, SOLUTION INTRAMUSCULAR; INTRAVENOUS; SUBCUTANEOUS at 19:33

## 2024-04-27 RX ADMIN — KETOROLAC TROMETHAMINE 30 MG: 30 INJECTION, SOLUTION INTRAMUSCULAR at 21:06

## 2024-04-27 RX ADMIN — PIPERACILLIN AND TAZOBACTAM 3.38 G: 3; .375 INJECTION, POWDER, FOR SOLUTION INTRAVENOUS at 05:25

## 2024-04-27 RX ADMIN — ONDANSETRON 4 MG: 2 INJECTION INTRAMUSCULAR; INTRAVENOUS at 14:34

## 2024-04-27 RX ADMIN — HYDROMORPHONE HYDROCHLORIDE 0.5 MG: 1 INJECTION, SOLUTION INTRAMUSCULAR; INTRAVENOUS; SUBCUTANEOUS at 14:06

## 2024-04-27 RX ADMIN — DICYCLOMINE HYDROCHLORIDE 20 MG: 10 CAPSULE ORAL at 20:35

## 2024-04-27 RX ADMIN — PROCHLORPERAZINE EDISYLATE 10 MG: 5 INJECTION INTRAMUSCULAR; INTRAVENOUS at 03:07

## 2024-04-27 RX ADMIN — SODIUM CHLORIDE, POTASSIUM CHLORIDE, SODIUM LACTATE AND CALCIUM CHLORIDE 150 ML/HR: 600; 310; 30; 20 INJECTION, SOLUTION INTRAVENOUS at 06:56

## 2024-04-27 RX ADMIN — HYDROMORPHONE HYDROCHLORIDE 0.5 MG: 1 INJECTION, SOLUTION INTRAMUSCULAR; INTRAVENOUS; SUBCUTANEOUS at 21:37

## 2024-04-27 RX ADMIN — Medication 10 ML: at 08:01

## 2024-04-27 RX ADMIN — HYDROMORPHONE HYDROCHLORIDE 0.5 MG: 1 INJECTION, SOLUTION INTRAMUSCULAR; INTRAVENOUS; SUBCUTANEOUS at 08:01

## 2024-04-27 RX ADMIN — HYDROMORPHONE HYDROCHLORIDE 0.5 MG: 1 INJECTION, SOLUTION INTRAMUSCULAR; INTRAVENOUS; SUBCUTANEOUS at 17:26

## 2024-04-27 RX ADMIN — PIPERACILLIN AND TAZOBACTAM 3.38 G: 3; .375 INJECTION, POWDER, FOR SOLUTION INTRAVENOUS at 13:11

## 2024-04-27 RX ADMIN — PIPERACILLIN AND TAZOBACTAM 3.38 G: 3; .375 INJECTION, POWDER, FOR SOLUTION INTRAVENOUS at 20:35

## 2024-04-27 RX ADMIN — ONDANSETRON 4 MG: 2 INJECTION INTRAMUSCULAR; INTRAVENOUS at 21:43

## 2024-04-27 RX ADMIN — ONDANSETRON 4 MG: 2 INJECTION INTRAMUSCULAR; INTRAVENOUS at 05:36

## 2024-04-27 RX ADMIN — HEPARIN SODIUM 5000 UNITS: 5000 INJECTION INTRAVENOUS; SUBCUTANEOUS at 21:37

## 2024-04-27 RX ADMIN — DICYCLOMINE HYDROCHLORIDE 20 MG: 10 CAPSULE ORAL at 10:42

## 2024-04-27 RX ADMIN — Medication 10 ML: at 20:35

## 2024-04-27 RX ADMIN — HYDROMORPHONE HYDROCHLORIDE 0.5 MG: 1 INJECTION, SOLUTION INTRAMUSCULAR; INTRAVENOUS; SUBCUTANEOUS at 03:06

## 2024-04-27 RX ADMIN — HYDROMORPHONE HYDROCHLORIDE 0.5 MG: 1 INJECTION, SOLUTION INTRAMUSCULAR; INTRAVENOUS; SUBCUTANEOUS at 05:36

## 2024-04-27 RX ADMIN — PROCHLORPERAZINE EDISYLATE 10 MG: 5 INJECTION INTRAMUSCULAR; INTRAVENOUS at 19:34

## 2024-04-27 RX ADMIN — DICYCLOMINE HYDROCHLORIDE 20 MG: 10 CAPSULE ORAL at 17:26

## 2024-04-27 RX ADMIN — HEPARIN SODIUM 5000 UNITS: 5000 INJECTION INTRAVENOUS; SUBCUTANEOUS at 05:26

## 2024-04-27 NOTE — PROGRESS NOTES
Colorectal Surgery Progress Note    Pt. Name/Age/:  Carin Price   28 y.o.    1995         Med. Record Number:   [id number]  Date of admission:  2024      Service(s): Colorectal Surgery      Subjective    Doing better   Tolerating FLD   No nausea  Reports abdominal pain better   Afebrile and hemodynamically stable     Objective  Vitals:     Patient Vitals for the past 24 hrs:   BP Temp Temp src Pulse Resp SpO2   24 0426 117/81 97.5 °F (36.4 °C) Oral -- 16 95 %   24 0100 111/76 98.1 °F (36.7 °C) Oral 81 16 95 %   24 143/77 98.1 °F (36.7 °C) Oral 97 15 96 %   24 1258 136/91 98.4 °F (36.9 °C) Axillary 92 16 98 %           Wt. Admission: Weight: 113 kg (250 lb)     Wt. Current: Weight: 112 kg (246 lb 7.6 oz)   Body mass index is 38.6 kg/m².      I&O:  Intake/Output Summary (Last 24 hours) at 2024 1130  Last data filed at 2024 0539  Gross per 24 hour   Intake 1720 ml   Output 3500 ml   Net -1780 ml     1901 -  0700  In: 1840 [P.O.:840; I.V.:900]  Out: 4700 [Urine:4700]      Exam  General:  No acute distress  Head: Normocephalic, atraumatic  Neuro: Alert and oriented     Abdomen:  Soft, mild tender on right upper quadrant, non-distended, no masses, no hernias, no guarding, no diffuse peritoneal sign.      Labs:     [unfilled]          Scheduled Meds:dicyclomine, 20 mg, Oral, TID  heparin (porcine), 5,000 Units, Subcutaneous, Q8H  piperacillin-tazobactam, 3.375 g, Intravenous, Q8H  sodium chloride, 10 mL, Intravenous, Q12H      Continuous Infusions:lactated ringers, 150 mL/hr, Last Rate: 150 mL/hr (24 0656)  Pharmacy to Dose Zosyn,       PRN Meds:.  Calcium Replacement - Follow Nurse / BPA Driven Protocol    HYDROmorphone    Magnesium Standard Dose Replacement - Follow Nurse / BPA Driven Protocol    ondansetron ODT **OR** ondansetron    Pharmacy to Dose Zosyn    Phosphorus Replacement - Follow Nurse / BPA Driven Protocol    Potassium Replacement -  Follow Nurse / BPA Driven Protocol    prochlorperazine    [COMPLETED] Insert Peripheral IV **AND** sodium chloride    sodium chloride    sodium chloride          Assessment  28 y.o. female with chron's disease with acute on chronic terminal ileitis     Plan / Recommendations    - showing improvements clinically      - clear from surgical stand point for discharge. I will follow with her as outpatient.     - appreciate GI input. Management of immunosuppression per GI team.         11:30 EDT; 4/27/2024        Sachin Murphy MD  Colon and Rectal Surgery   [facility]

## 2024-04-27 NOTE — PLAN OF CARE
Goal Outcome Evaluation:     C/o abdominal pain and nausea, states that pain seemed to come back when she started to have the full liquid diet. Pain/nausea medication given. No vomiting noted. Voided well. Vital signs taken and recorded. Plan of care ongoing. Call light within reach.                                            no chest pain, no cough, and no shortness of breath.

## 2024-04-27 NOTE — PLAN OF CARE
Goal Outcome Evaluation:         Pt is able to make needs known. Pain is managed with medication regimen. Pt is able ambulate independently. Education is complete, call light is in reach, and no other needs at this time. Continue to monitor.

## 2024-04-27 NOTE — PROGRESS NOTES
" LOS: 2 days   Patient Care Team:  Thien Reagan MD as PCP - General (Gastroenterology)      Subjective   \" I'm doing okay.  Just a little sore.\"    Interval History:   Labs: Sodium potassium are normal.  Creatinine 0.8.  LFTs are normal except for ALT at 61 (72).  C-reactive protein 13.06 (3.22).  WBCs 10.08, hemoglobin 11.6, platelets 335.  Patient is tolerating clear liquids with some occasional pain with Jell-O.  Patient states her pain comes in waves.  She is still passing gas but no bowel movement yet.  She has never had any bloody stool with this flare.  Patient is up ambulatory in room and sitting in chair.    ROS:   No chest pain, shortness of breath, or cough.         Medication Review:     Current Facility-Administered Medications:     Calcium Replacement - Follow Nurse / BPA Driven Protocol, , Does not apply, PRNJammie Waitman, DO    heparin (porcine) 5000 UNIT/ML injection 5,000 Units, 5,000 Units, Subcutaneous, Q8H, Carmelo Richards MD, 5,000 Units at 04/27/24 0526    HYDROmorphone (DILAUDID) injection 0.5 mg, 0.5 mg, Intravenous, Q2H PRN, Dana Agudelo DO, 0.5 mg at 04/27/24 0801    lactated ringers infusion, 150 mL/hr, Intravenous, Continuous, Carmelo Richards MD, Last Rate: 150 mL/hr at 04/27/24 0656, 150 mL/hr at 04/27/24 0656    Magnesium Standard Dose Replacement - Follow Nurse / BPA Driven Protocol, , Does not apply, PRNJammie Waitman, DO    ondansetron ODT (ZOFRAN-ODT) disintegrating tablet 4 mg, 4 mg, Oral, Q6H PRN **OR** ondansetron (ZOFRAN) injection 4 mg, 4 mg, Intravenous, Q6H PRN, Dana Agudelo DO, 4 mg at 04/27/24 0536    Pharmacy to Dose Zosyn, , Does not apply, Continuous PRN, Dana Agudelo DO    Phosphorus Replacement - Follow Nurse / BPA Driven Protocol, , Does not apply, PRNJammie Waitman, DO    piperacillin-tazobactam (ZOSYN) 3.375 g IVPB in 100 mL NS MBP (CD), 3.375 g, Intravenous, Q8H, Dana Agudelo DO, Last Rate: 0 mL/hr at 04/27/24 0105, 3.375 g at " 04/27/24 0525    Potassium Replacement - Follow Nurse / BPA Driven Protocol, , Does not apply, PRN, Dana Agudelo,     prochlorperazine (COMPAZINE) injection 10 mg, 10 mg, Intravenous, Q6H PRN, Dana Agudelo, , 10 mg at 04/27/24 0307    [COMPLETED] Insert Peripheral IV, , , Once **AND** sodium chloride 0.9 % flush 10 mL, 10 mL, Intravenous, PRN, Dana Agudelo, DO    sodium chloride 0.9 % flush 10 mL, 10 mL, Intravenous, Q12H, Dana Agudelo, , 10 mL at 04/27/24 0801    sodium chloride 0.9 % flush 10 mL, 10 mL, Intravenous, PRN, Dana Agudelo,     sodium chloride 0.9 % infusion 40 mL, 40 mL, Intravenous, PRN, Dana Agudelo,       Objective comfortable in room.  No family present.  NAD.    Vital Signs  Temp:  [97.5 °F (36.4 °C)-98.4 °F (36.9 °C)] 97.5 °F (36.4 °C)  Heart Rate:  [81-97] 81  Resp:  [15-16] 16  BP: (111-143)/(76-91) 117/81  Physical Exam:    General Appearance:    Awake and alert, in no acute distress   Head:    Normocephalic, without obvious abnormality   Eyes:          Conjunctivae normal, anicteric sclerae   Ears:    Hearing intact   Throat:   No oral lesions, no thrush, oral mucosa moist   Neck:   No adenopathy, supple, no JVD   Lungs:      respirations regular, even and unlabored        Abdomen:      soft, non-tender, no rebound or guarding, non-distended, no hepatosplenomegaly   Rectal:     Deferred   Extremities:   No edema, no cyanosis, no redness   Skin:   No bleeding, bruising or rash, no jaundice   Neurologic:   Cranial nerves 2 - 12 grossly intact, no asterixis, sensation   intact        Results Review:    CBC    Results from last 7 days   Lab Units 04/27/24  0222 04/26/24  0154 04/25/24  0019 04/24/24  1816   WBC 10*3/mm3 10.08 12.73* 14.09* 13.71*   HEMOGLOBIN g/dL 11.6* 12.5 12.4 13.4   PLATELETS 10*3/mm3 335 370 378 438     CMP   Results from last 7 days   Lab Units 04/27/24  0222 04/26/24  0154 04/25/24  0019 04/24/24  1816   SODIUM mmol/L 138 136 138 136   POTASSIUM  "mmol/L 4.0 3.9 3.2* 3.4*   CHLORIDE mmol/L 103 99 103 100   CO2 mmol/L 24.0 26.0 24.0 26.0   BUN mg/dL 5* 7 11 13   CREATININE mg/dL 0.80 1.02* 0.73 0.78   GLUCOSE mg/dL 97 81 83 83   ALBUMIN g/dL 3.2* 3.5 3.5 4.0   BILIRUBIN mg/dL 1.1 1.1 0.6 0.3   ALK PHOS U/L 101 87 85 92   AST (SGOT) U/L 21 26 23 24   ALT (SGPT) U/L 61* 72* 69* 77*   MAGNESIUM mg/dL 2.3 2.3  --   --    PHOSPHORUS mg/dL 3.2 3.9  --   --    LIPASE U/L  --   --   --  31     Cr Clearance Estimated Creatinine Clearance: 135.2 mL/min (by C-G formula based on SCr of 0.8 mg/dL).  Coag     HbA1C No results found for: \"HGBA1C\"  Blood Glucose No results found for: \"POCGLU\"  Infection   Results from last 7 days   Lab Units 04/24/24  2127   BLOODCX  No growth at 2 days  No growth at 2 days     UA    Results from last 7 days   Lab Units 04/24/24  1816   NITRITE UA  Negative     Radiology(recent) No radiology results for the last day          Assessment & Plan   28-year-old female with ileocolonic Crohn's disease previously on Humira but lost insurance coverage in January 2024 and off medication presented 4/24/2024 with worsening abdominal pain and CT suggests inflammatory changes at the terminal ileum with microperforation and fistula.     Crohn's exacerbation with inflammation at terminal ileum/microperforation/fistula  Right-sided abdominal pain  Nausea/vomiting  Leukocytosis  Previous Crohn's related abscess  Elevated ALT/hepatic steatosis         Plan:  28-year-old female with history of Crohn's and previously on Humira lost insurance coverage January 2024 was off medication.  Symptoms have been flaring over the past few months.  She has completed prednisone taper as of yesterday and just 2 weeks ago started Humira biosimilar.  She has history of Crohn's related abscess and was hospitalized in the past. Current CT shows active Crohn's in the terminal ileum with inflammatory mass and microperforation as well as developing complex fistula.     Patient " feeling and looking better today.  Up ambulatory in room.  Is passing gas but no bowel movement yet.  Is tolerating clear liquids with some pain.  General surgery is following and hoping to avoid surgery.  Spoke with Dr. Reagan this morning and he recommends Skyrizi.  Spoke to the patient regarding this and she knows a little bit about the medication but she is going to research further.  She is open to the idea.  Will repeat hepatitis B and TB Gold so if insurance approves we can proceed with getting the patient medication.  Continue full liquid diet until seen by general surgery.  Will gradually increase when okay with general surgery.    Will place patient on Bentyl for abdominal pain.  Continue Zosyn.  Continue to encourage ambulation.        Chika Elliott, APRN  04/27/24  09:52 EDT

## 2024-04-27 NOTE — PROGRESS NOTES
[facility] MEDICINE SERVICE  DAILY PROGRESS NOTE    NAME: Carin Price  : 1995  MRN: [id number]      LOS: 2 days     PROVIDER OF SERVICE: Carmelo Richards MD    Chief Complaint: Crohn disease    Subjective:     Interval History:  History taken from: patient chart RN  Afebrile pain controlled    Review of Systems:   Review of Systems  All negative except as above  Objective:     Vital Signs  Temp:  [97.5 °F (36.4 °C)-98.4 °F (36.9 °C)] 97.5 °F (36.4 °C)  Heart Rate:  [81-97] 81  Resp:  [15-16] 16  BP: (111-143)/(76-91) 117/81   Body mass index is 38.6 kg/m².    Physical Exam  Physical Exam  AOx3 NAD  RRR S1 and S2  Lungs CTA  Abdomen soft nontender nondistended  Scheduled Meds   dicyclomine, 20 mg, Oral, TID  heparin (porcine), 5,000 Units, Subcutaneous, Q8H  piperacillin-tazobactam, 3.375 g, Intravenous, Q8H  sodium chloride, 10 mL, Intravenous, Q12H       PRN Meds     Calcium Replacement - Follow Nurse / BPA Driven Protocol    HYDROmorphone    Magnesium Standard Dose Replacement - Follow Nurse / BPA Driven Protocol    ondansetron ODT **OR** ondansetron    Pharmacy to Dose Zosyn    Phosphorus Replacement - Follow Nurse / BPA Driven Protocol    Potassium Replacement - Follow Nurse / BPA Driven Protocol    prochlorperazine    [COMPLETED] Insert Peripheral IV **AND** sodium chloride    sodium chloride    sodium chloride   Infusions  lactated ringers, 150 mL/hr, Last Rate: 150 mL/hr (24 0656)  Pharmacy to Dose Zosyn,           Diagnostic Data    Results from last 7 days   Lab Units 24  0222   WBC 10*3/mm3 10.08   HEMOGLOBIN g/dL 11.6*   HEMATOCRIT % 35.4   PLATELETS 10*3/mm3 335   GLUCOSE mg/dL 97   CREATININE mg/dL 0.80   BUN mg/dL 5*   SODIUM mmol/L 138   POTASSIUM mmol/L 4.0   AST (SGOT) U/L 21   ALT (SGPT) U/L 61*   ALK PHOS U/L 101   BILIRUBIN mg/dL 1.1   ANION GAP mmol/L 11.0       No radiology results for the last day      I reviewed the patient's new clinical results.  I reviewed  the patient's new imaging results and agree with the interpretation.    Assessment/Plan:     Active and Resolved Problems  Active Hospital Problems    Diagnosis  POA    **Crohn disease [K50.90]  Yes      Resolved Hospital Problems   No resolved problems to display.       28-year-old female with history of Crohn's disease previously on Humira admitted to [facility] 4/24 with worsening right lower quadrant abdominal pain     #Acute Crohn's flare  Previously on Humira however due to insurance issues transitioned to Hyrimoz (adalimumab-adaz). Last dose 2 weeks ago  CT abdomen pelvis consistent with acute Crohn's flare centered at terminal ileum.  There is inflammatory mass centered in the adjacent right lower quadrant mesentery with ill-defined tracts versus developing complex fistula  GI and CRS following.  Noted plan to hold off on surgery  On IV Zosyn  Continue IV fluids  Continue current pain control Zofran as needed  Follow infectious workup  Advance diet per surgery     #Mild JALEEL-resolved  Creatinine improved 2.8  Continue IV fluids  BMP daily     #Electrolyte abnormalities  Replace recheck     #Incidental finding  CT abdomen pelvis showed mixed density 5.5 cm right ovarian lesion containing macroscopic fat, soft tissue density and calcifications compatible with ovarian dermoid     Outpatient follow-up with PCP    DVT prophylaxis:  Medical and mechanical DVT prophylaxis orders are present.         Code status is   Code Status and Medical Interventions:   Ordered at: 04/24/24 2213     Code Status (Patient has no pulse and is not breathing):    CPR (Attempt to Resuscitate)     Medical Interventions (Patient has pulse or is breathing):    Full Support       Plan for disposition: Anticipate discharge in 2 days    Time: 30 minutes    Signature: Electronically signed by Carmelo Richards MD, 04/27/24, 11:21 EDT.  [facility] Hospitalist Team

## 2024-04-28 ENCOUNTER — INPATIENT HOSPITAL (OUTPATIENT)
Dept: URBAN - METROPOLITAN AREA HOSPITAL 84 | Facility: HOSPITAL | Age: 29
End: 2024-04-28

## 2024-04-28 ENCOUNTER — APPOINTMENT (OUTPATIENT)
Dept: CT IMAGING | Facility: HOSPITAL | Age: 29
DRG: 386 | End: 2024-04-28
Payer: COMMERCIAL

## 2024-04-28 DIAGNOSIS — K76.0 FATTY (CHANGE OF) LIVER, NOT ELSEWHERE CLASSIFIED: ICD-10-CM

## 2024-04-28 DIAGNOSIS — D72.829 ELEVATED WHITE BLOOD CELL COUNT, UNSPECIFIED: ICD-10-CM

## 2024-04-28 DIAGNOSIS — K50.813 CROHN'S DISEASE OF BOTH SMALL AND LARGE INTESTINE WITH FISTU: ICD-10-CM

## 2024-04-28 DIAGNOSIS — R10.31 RIGHT LOWER QUADRANT PAIN: ICD-10-CM

## 2024-04-28 DIAGNOSIS — R11.2 NAUSEA WITH VOMITING, UNSPECIFIED: ICD-10-CM

## 2024-04-28 LAB
ALBUMIN SERPL-MCNC: 3.2 G/DL (ref 3.5–5.2)
ALBUMIN/GLOB SERPL: 1.1 G/DL
ALP SERPL-CCNC: 81 U/L (ref 39–117)
ALT SERPL W P-5'-P-CCNC: 44 U/L (ref 1–33)
ANION GAP SERPL CALCULATED.3IONS-SCNC: 8 MMOL/L (ref 5–15)
AST SERPL-CCNC: 14 U/L (ref 1–32)
BILIRUB SERPL-MCNC: 0.5 MG/DL (ref 0–1.2)
BUN SERPL-MCNC: 3 MG/DL (ref 6–20)
BUN/CREAT SERPL: 4.1 (ref 7–25)
CALCIUM SPEC-SCNC: 8.9 MG/DL (ref 8.6–10.5)
CHLORIDE SERPL-SCNC: 104 MMOL/L (ref 98–107)
CO2 SERPL-SCNC: 25 MMOL/L (ref 22–29)
CREAT SERPL-MCNC: 0.74 MG/DL (ref 0.57–1)
CRP SERPL-MCNC: 11.27 MG/DL (ref 0–0.5)
DEPRECATED RDW RBC AUTO: 41.5 FL (ref 37–54)
EGFRCR SERPLBLD CKD-EPI 2021: 113.2 ML/MIN/1.73
ERYTHROCYTE [DISTWIDTH] IN BLOOD BY AUTOMATED COUNT: 13.2 % (ref 12.3–15.4)
GLOBULIN UR ELPH-MCNC: 3 GM/DL
GLUCOSE SERPL-MCNC: 95 MG/DL (ref 65–99)
HBV CORE AB SERPL QL IA: NEGATIVE
HCT VFR BLD AUTO: 38.3 % (ref 34–46.6)
HGB BLD-MCNC: 12.5 G/DL (ref 12–15.9)
MAGNESIUM SERPL-MCNC: 2.6 MG/DL (ref 1.6–2.6)
MCH RBC QN AUTO: 28.2 PG (ref 26.6–33)
MCHC RBC AUTO-ENTMCNC: 32.6 G/DL (ref 31.5–35.7)
MCV RBC AUTO: 86.3 FL (ref 79–97)
PHOSPHATE SERPL-MCNC: 3.7 MG/DL (ref 2.5–4.5)
PLATELET # BLD AUTO: 385 10*3/MM3 (ref 140–450)
PMV BLD AUTO: 8.7 FL (ref 6–12)
POTASSIUM SERPL-SCNC: 4.1 MMOL/L (ref 3.5–5.2)
PROT SERPL-MCNC: 6.2 G/DL (ref 6–8.5)
RBC # BLD AUTO: 4.44 10*6/MM3 (ref 3.77–5.28)
SODIUM SERPL-SCNC: 137 MMOL/L (ref 136–145)
WBC NRBC COR # BLD AUTO: 9.79 10*3/MM3 (ref 3.4–10.8)

## 2024-04-28 PROCEDURE — 25510000001 IOPAMIDOL PER 1 ML: Performed by: HOSPITALIST

## 2024-04-28 PROCEDURE — 86140 C-REACTIVE PROTEIN: CPT | Performed by: NURSE PRACTITIONER

## 2024-04-28 PROCEDURE — 25010000002 ONDANSETRON PER 1 MG: Performed by: STUDENT IN AN ORGANIZED HEALTH CARE EDUCATION/TRAINING PROGRAM

## 2024-04-28 PROCEDURE — 25810000003 LACTATED RINGERS PER 1000 ML: Performed by: HOSPITALIST

## 2024-04-28 PROCEDURE — 25010000002 PROCHLORPERAZINE 10 MG/2ML SOLUTION: Performed by: STUDENT IN AN ORGANIZED HEALTH CARE EDUCATION/TRAINING PROGRAM

## 2024-04-28 PROCEDURE — 25010000002 HEPARIN (PORCINE) PER 1000 UNITS: Performed by: HOSPITALIST

## 2024-04-28 PROCEDURE — 25010000002 HYDROMORPHONE 1 MG/ML SOLUTION: Performed by: NURSE PRACTITIONER

## 2024-04-28 PROCEDURE — 99232 SBSQ HOSP IP/OBS MODERATE 35: CPT | Performed by: NURSE PRACTITIONER

## 2024-04-28 PROCEDURE — 25010000002 PIPERACILLIN SOD-TAZOBACTAM PER 1 G: Performed by: STUDENT IN AN ORGANIZED HEALTH CARE EDUCATION/TRAINING PROGRAM

## 2024-04-28 PROCEDURE — 25010000002 METHYLPREDNISOLONE PER 40 MG: Performed by: NURSE PRACTITIONER

## 2024-04-28 PROCEDURE — 83735 ASSAY OF MAGNESIUM: CPT | Performed by: HOSPITALIST

## 2024-04-28 PROCEDURE — 80053 COMPREHEN METABOLIC PANEL: CPT | Performed by: STUDENT IN AN ORGANIZED HEALTH CARE EDUCATION/TRAINING PROGRAM

## 2024-04-28 PROCEDURE — 74177 CT ABD & PELVIS W/CONTRAST: CPT

## 2024-04-28 PROCEDURE — 84100 ASSAY OF PHOSPHORUS: CPT | Performed by: HOSPITALIST

## 2024-04-28 PROCEDURE — 85027 COMPLETE CBC AUTOMATED: CPT | Performed by: NURSE PRACTITIONER

## 2024-04-28 RX ORDER — METHYLPREDNISOLONE SODIUM SUCCINATE 40 MG/ML
20 INJECTION, POWDER, LYOPHILIZED, FOR SOLUTION INTRAMUSCULAR; INTRAVENOUS EVERY 12 HOURS
Status: DISCONTINUED | OUTPATIENT
Start: 2024-04-28 | End: 2024-04-29

## 2024-04-28 RX ADMIN — PROCHLORPERAZINE EDISYLATE 10 MG: 5 INJECTION INTRAMUSCULAR; INTRAVENOUS at 10:52

## 2024-04-28 RX ADMIN — HYDROMORPHONE HYDROCHLORIDE 1 MG: 1 INJECTION, SOLUTION INTRAMUSCULAR; INTRAVENOUS; SUBCUTANEOUS at 06:20

## 2024-04-28 RX ADMIN — Medication 10 ML: at 21:42

## 2024-04-28 RX ADMIN — SODIUM CHLORIDE, POTASSIUM CHLORIDE, SODIUM LACTATE AND CALCIUM CHLORIDE 150 ML/HR: 600; 310; 30; 20 INJECTION, SOLUTION INTRAVENOUS at 10:32

## 2024-04-28 RX ADMIN — HEPARIN SODIUM 5000 UNITS: 5000 INJECTION INTRAVENOUS; SUBCUTANEOUS at 21:41

## 2024-04-28 RX ADMIN — IOPAMIDOL 100 ML: 755 INJECTION, SOLUTION INTRAVENOUS at 09:22

## 2024-04-28 RX ADMIN — PIPERACILLIN AND TAZOBACTAM 3.38 G: 3; .375 INJECTION, POWDER, FOR SOLUTION INTRAVENOUS at 21:00

## 2024-04-28 RX ADMIN — DICYCLOMINE HYDROCHLORIDE 20 MG: 10 CAPSULE ORAL at 16:01

## 2024-04-28 RX ADMIN — Medication 10 ML: at 08:05

## 2024-04-28 RX ADMIN — DICYCLOMINE HYDROCHLORIDE 20 MG: 10 CAPSULE ORAL at 08:05

## 2024-04-28 RX ADMIN — HYDROCODONE BITARTRATE AND ACETAMINOPHEN 1 TABLET: 5; 325 TABLET ORAL at 10:32

## 2024-04-28 RX ADMIN — ONDANSETRON 4 MG: 2 INJECTION INTRAMUSCULAR; INTRAVENOUS at 16:02

## 2024-04-28 RX ADMIN — SODIUM CHLORIDE, POTASSIUM CHLORIDE, SODIUM LACTATE AND CALCIUM CHLORIDE 150 ML/HR: 600; 310; 30; 20 INJECTION, SOLUTION INTRAVENOUS at 16:57

## 2024-04-28 RX ADMIN — HEPARIN SODIUM 5000 UNITS: 5000 INJECTION INTRAVENOUS; SUBCUTANEOUS at 06:02

## 2024-04-28 RX ADMIN — HEPARIN SODIUM 5000 UNITS: 5000 INJECTION INTRAVENOUS; SUBCUTANEOUS at 13:13

## 2024-04-28 RX ADMIN — HYDROCODONE BITARTRATE AND ACETAMINOPHEN 1 TABLET: 5; 325 TABLET ORAL at 01:55

## 2024-04-28 RX ADMIN — PIPERACILLIN AND TAZOBACTAM 3.38 G: 3; .375 INJECTION, POWDER, FOR SOLUTION INTRAVENOUS at 06:02

## 2024-04-28 RX ADMIN — ONDANSETRON 4 MG: 2 INJECTION INTRAMUSCULAR; INTRAVENOUS at 22:20

## 2024-04-28 RX ADMIN — METHYLPREDNISOLONE SODIUM SUCCINATE 20 MG: 40 INJECTION, POWDER, FOR SOLUTION INTRAMUSCULAR; INTRAVENOUS at 16:57

## 2024-04-28 RX ADMIN — PIPERACILLIN AND TAZOBACTAM 3.38 G: 3; .375 INJECTION, POWDER, FOR SOLUTION INTRAVENOUS at 13:13

## 2024-04-28 RX ADMIN — PROCHLORPERAZINE EDISYLATE 10 MG: 5 INJECTION INTRAMUSCULAR; INTRAVENOUS at 01:55

## 2024-04-28 RX ADMIN — DICYCLOMINE HYDROCHLORIDE 20 MG: 10 CAPSULE ORAL at 21:41

## 2024-04-28 RX ADMIN — ONDANSETRON 4 MG: 2 INJECTION INTRAMUSCULAR; INTRAVENOUS at 06:21

## 2024-04-28 NOTE — PROGRESS NOTES
" LOS: 3 days   Patient Care Team:  Thien Reagan MD as PCP - General (Gastroenterology)      Subjective   \"What about my pain regimen?     Interval History:   Labs: Sodium potassium and creatinine are all normal.  ALT remains elevated but down to 44.  C-reactive protein is down to 11.27 (13.86).  WBCs 9.75, hemoglobin 12.5, platelets 385.    KUB last night showed no acute abnormality.  Nonobstructive bowel gas pattern.  Moderate stool burden.  CT of the abdomen and pelvis with contrast showed increased inflammatory changes around the terminal ileum in keeping with Crohn's related terminal ileitis.  Phlegmon but no drainable pockets.  No evidence of perforation or new extraluminal gas.  Hepatic steatosis.  Multiple conversations overnight regarding patient's pain.  Evidently she was having intractable pain and was inconsolable.  Patient was requesting Dilaudid to be increased.  Tried 1 dose of Toradol without relief.  Placed her on Norco 5 mg with little relief.  Eventually increased her Dilaudid to 1 mg every 2 hours.  Nursing staff reports that she slept between requesting pain medications. Patient states pain is in right & left upper quadrants & waxes & wanes. Pain has been 7/10 a couple of times but never less than that. She is always pleasantly smiling in bed.   Case discussed with Dr. Murphy this morning.  Repeat CT results as noted above.  Plan is to continue antibiotics and hoping to avoid surgery.     ROS:   Abdominal pain   No chest pain, shortness of breath, or cough.         Medication Review:     Current Facility-Administered Medications:     Calcium Replacement - Follow Nurse / BPA Driven Protocol, , Does not apply, PRN, Dana Agudelo, DO    dicyclomine (BENTYL) capsule 20 mg, 20 mg, Oral, TID, Chika Elliott, APRN, 20 mg at 04/28/24 0805    heparin (porcine) 5000 UNIT/ML injection 5,000 Units, 5,000 Units, Subcutaneous, Q8H, Carmelo Richards MD, 5,000 Units at 04/28/24 0602    " HYDROcodone-acetaminophen (NORCO) 5-325 MG per tablet 1 tablet, 1 tablet, Oral, Q6H PRN, Chika Elliott APRN, 1 tablet at 04/28/24 1032    HYDROmorphone (DILAUDID) injection 1 mg, 1 mg, Intravenous, Q2H PRN, Chika Elliott APRN, 1 mg at 04/28/24 0620    lactated ringers infusion, 150 mL/hr, Intravenous, Continuous, Carmelo Richards MD, Last Rate: 150 mL/hr at 04/28/24 1032, 150 mL/hr at 04/28/24 1032    Magnesium Standard Dose Replacement - Follow Nurse / BPA Driven Protocol, , Does not apply, PRN, Dana Agudelo DO    ondansetron ODT (ZOFRAN-ODT) disintegrating tablet 4 mg, 4 mg, Oral, Q6H PRN **OR** ondansetron (ZOFRAN) injection 4 mg, 4 mg, Intravenous, Q6H PRN, Dana Agudelo DO, 4 mg at 04/28/24 0621    Pharmacy to Dose Zosyn, , Does not apply, Continuous PRN, Dana Agudelo DO    Phosphorus Replacement - Follow Nurse / BPA Driven Protocol, , Does not apply, PRN, Dana Agudelo DO    piperacillin-tazobactam (ZOSYN) 3.375 g IVPB in 100 mL NS MBP (CD), 3.375 g, Intravenous, Q8H, Dana Agudelo DO, Last Rate: 0 mL/hr at 04/27/24 0105, 3.375 g at 04/28/24 0602    Potassium Replacement - Follow Nurse / BPA Driven Protocol, , Does not apply, PRN, Dana Agudelo DO    prochlorperazine (COMPAZINE) injection 10 mg, 10 mg, Intravenous, Q6H PRN, Dana Agudelo DO, 10 mg at 04/28/24 1052    [COMPLETED] Insert Peripheral IV, , , Once **AND** sodium chloride 0.9 % flush 10 mL, 10 mL, Intravenous, PRN, Dana Agudelo DO    sodium chloride 0.9 % flush 10 mL, 10 mL, Intravenous, Q12H, Dana Agudelo DO, 10 mL at 04/28/24 0805    sodium chloride 0.9 % flush 10 mL, 10 mL, Intravenous, PRN, Dana Agudelo, DO    sodium chloride 0.9 % infusion 40 mL, 40 mL, Intravenous, PRN, Dana Agudelo, DO      Objective  sitting up in bed.  No family present.  NAD.    Vital Signs  Temp:  [97.5 °F (36.4 °C)-99.1 °F (37.3 °C)] 97.6 °F (36.4 °C)  Heart Rate:  [70-83] 83  Resp:  [14-16] 16  BP: (107-133)/(69-89)  "107/70  Physical Exam:    General Appearance:    Awake and alert, in no acute distress   Head:    Normocephalic, without obvious abnormality   Eyes:          Conjunctivae normal, anicteric sclerae   Ears:    Hearing intact   Throat:   No oral lesions, no thrush, oral mucosa moist   Neck:   No adenopathy, supple, no JVD   Lungs:      respirations regular, even and unlabored        Abdomen:      soft, non-tender, no rebound or guarding, non-distended, no hepatosplenomegaly   Rectal:     Deferred   Extremities:   No edema, no cyanosis, no redness   Skin:   No bleeding, bruising or rash, no jaundice   Neurologic:   Cranial nerves 2 - 12 grossly intact, no asterixis, sensation   intact        Results Review:    CBC    Results from last 7 days   Lab Units 04/28/24  0759 04/27/24  0222 04/26/24  0154 04/25/24  0019 04/24/24  1816   WBC 10*3/mm3 9.79 10.08 12.73* 14.09* 13.71*   HEMOGLOBIN g/dL 12.5 11.6* 12.5 12.4 13.4   PLATELETS 10*3/mm3 385 335 370 378 438     CMP   Results from last 7 days   Lab Units 04/28/24  0614 04/27/24  0222 04/26/24  0154 04/25/24  0019 04/24/24  1816   SODIUM mmol/L 137 138 136 138 136   POTASSIUM mmol/L 4.1 4.0 3.9 3.2* 3.4*   CHLORIDE mmol/L 104 103 99 103 100   CO2 mmol/L 25.0 24.0 26.0 24.0 26.0   BUN mg/dL 3* 5* 7 11 13   CREATININE mg/dL 0.74 0.80 1.02* 0.73 0.78   GLUCOSE mg/dL 95 97 81 83 83   ALBUMIN g/dL 3.2* 3.2* 3.5 3.5 4.0   BILIRUBIN mg/dL 0.5 1.1 1.1 0.6 0.3   ALK PHOS U/L 81 101 87 85 92   AST (SGOT) U/L 14 21 26 23 24   ALT (SGPT) U/L 44* 61* 72* 69* 77*   MAGNESIUM mg/dL 2.6 2.3 2.3  --   --    PHOSPHORUS mg/dL 3.7 3.2 3.9  --   --    LIPASE U/L  --   --   --   --  31     Cr Clearance Estimated Creatinine Clearance: 147.6 mL/min (by C-G formula based on SCr of 0.74 mg/dL).  Coag     HbA1C No results found for: \"HGBA1C\"  Blood Glucose No results found for: \"POCGLU\"  Infection   Results from last 7 days   Lab Units 04/24/24  1525   BLOODCX  No growth at 3 days  No growth at 3 " days     UA    Results from last 7 days   Lab Units 04/24/24  1816   NITRITE UA  Negative     Radiology(recent) CT Abdomen Pelvis With Contrast    Result Date: 4/28/2024  Impression: Increased inflammatory change about the terminal ileum in keeping with Crohn's related terminal ileitis. Redemonstrated masslike phlegmon adjacent to the terminal ileum with suggestion of developing peripheral rim enhancement which could represent small early abscess formation. This only measures up to 1.6 cm and would not be amenable to drainage at this time. No evidence of perforation or new extraluminal gas. Hepatic steatosis. Unchanged right ovarian mature cystic teratoma. Electronically Signed: Saulo Brandt MD  4/28/2024 11:43 AM EDT  Workstation ID: [id number]    XR Abdomen KUB    Result Date: 4/28/2024  Impression: No acute abnormality Electronically Signed: Jeff Herrera MD  4/28/2024 4:56 AM EDT  Workstation ID: [id number]           Assessment & Plan   28-year-old female with ileocolonic Crohn's disease previously on Humira but lost insurance coverage in January 2024 and off medication presented 4/24/2024 with worsening abdominal pain and CT suggests inflammatory changes at the terminal ileum with microperforation and fistula.     Crohn's exacerbation with inflammation at terminal ileum/microperforation/fistula  Right-sided abdominal pain  Nausea/vomiting  Leukocytosis  Previous Crohn's related abscess  Elevated ALT/hepatic steatosis         Plan:  28-year-old female with history of Crohn's and previously on Humira lost insurance coverage January 2024 was off medication.  Symptoms have been flaring over the past few months.  She has completed prednisone taper as of yesterday and just 2 weeks ago started Humira biosimilar.  She has history of Crohn's related abscess and was hospitalized in the past. Current CT shows active Crohn's in the terminal ileum with inflammatory mass and microperforation as well as developing complex fistula.      Pain control issues. She states Norcos are not helping. Dilaudid 1mg is controlling until she can get another dose.  States she has shared her pain issues with Dr. Murphy.   Patient was not started on steroids initially due to concern for fistula or abscess.   CRP is down today & toradol did not help. Will start low dose solumedrol just twice a day & see how she does.   Repeat CT did not indicate anything acute.  Dr. Murphy and myself discussed treatment plan and results.  Continue Bentyl.  She has to get off of Dilaudid and have pain controlled by p.o. pain medications if she wants to be discharged.  Patient continues to complain of pain when she drinks her clear liquid tray.  Continue Zosyn.  Checking on Skyrizi as an outpatient.         Chika Elliott, APRN  04/28/24  12:47 EDT

## 2024-04-28 NOTE — PLAN OF CARE
Goal Outcome Evaluation:      Pt is able to make needs known. Per pt pain is not managed with medication regimen. Pt is able to ambulate independently. Education is complete, call light is in reach, and no other needs at this time. Continue to monitor.

## 2024-04-28 NOTE — PROGRESS NOTES
[facility] MEDICINE SERVICE  DAILY PROGRESS NOTE    NAME: Carin Price  : 1995  MRN: [id number]      LOS: 3 days     PROVIDER OF SERVICE: Carmelo Richards MD    Chief Complaint: Crohn disease    Subjective:     Interval History:  History taken from: patient chart RN  Laying comfortably in bed pain controlled afebrile    Review of Systems:   Review of Systems  All negative except as above  Objective:     Vital Signs  Temp:  [97.5 °F (36.4 °C)-99.1 °F (37.3 °C)] 97.6 °F (36.4 °C)  Heart Rate:  [70-83] 83  Resp:  [14-16] 16  BP: (107-133)/(69-89) 107/70   Body mass index is 39.36 kg/m².    Physical Exam  Physical Exam  AOx3 NAD  RRR S1 and S2  Lungs CTA  Abdomen soft nontender nondistended  Scheduled Meds   dicyclomine, 20 mg, Oral, TID  heparin (porcine), 5,000 Units, Subcutaneous, Q8H  piperacillin-tazobactam, 3.375 g, Intravenous, Q8H  sodium chloride, 10 mL, Intravenous, Q12H       PRN Meds     Calcium Replacement - Follow Nurse / BPA Driven Protocol    HYDROcodone-acetaminophen    HYDROmorphone    Magnesium Standard Dose Replacement - Follow Nurse / BPA Driven Protocol    ondansetron ODT **OR** ondansetron    Pharmacy to Dose Zosyn    Phosphorus Replacement - Follow Nurse / BPA Driven Protocol    Potassium Replacement - Follow Nurse / BPA Driven Protocol    prochlorperazine    [COMPLETED] Insert Peripheral IV **AND** sodium chloride    sodium chloride    sodium chloride   Infusions  lactated ringers, 150 mL/hr, Last Rate: 150 mL/hr (24 1032)  Pharmacy to Dose Zosyn,           Diagnostic Data    Results from last 7 days   Lab Units 24  0759 24  0614   WBC 10*3/mm3 9.79  --    HEMOGLOBIN g/dL 12.5  --    HEMATOCRIT % 38.3  --    PLATELETS 10*3/mm3 385  --    GLUCOSE mg/dL  --  95   CREATININE mg/dL  --  0.74   BUN mg/dL  --  3*   SODIUM mmol/L  --  137   POTASSIUM mmol/L  --  4.1   AST (SGOT) U/L  --  14   ALT (SGPT) U/L  --  44*   ALK PHOS U/L  --  81   BILIRUBIN mg/dL  --  0.5    ANION GAP mmol/L  --  8.0       CT Abdomen Pelvis With Contrast    Result Date: 4/28/2024  Impression: Increased inflammatory change about the terminal ileum in keeping with Crohn's related terminal ileitis. Redemonstrated masslike phlegmon adjacent to the terminal ileum with suggestion of developing peripheral rim enhancement which could represent small early abscess formation. This only measures up to 1.6 cm and would not be amenable to drainage at this time. No evidence of perforation or new extraluminal gas. Hepatic steatosis. Unchanged right ovarian mature cystic teratoma. Electronically Signed: Saulo Brandt MD  4/28/2024 11:43 AM EDT  Workstation ID: [id number]    XR Abdomen KUB    Result Date: 4/28/2024  Impression: No acute abnormality Electronically Signed: Jeff Herrera MD  4/28/2024 4:56 AM EDT  Workstation ID: [id number]       I reviewed the patient's new clinical results.  I reviewed the patient's new imaging results and agree with the interpretation.    Assessment/Plan:     Active and Resolved Problems  Active Hospital Problems    Diagnosis  POA    **Crohn disease [K50.90]  Yes      Resolved Hospital Problems   No resolved problems to display.       28-year-old female with history of Crohn's disease previously on Humira admitted to [facility], operated by [facility] 4/24 with worsening right lower quadrant abdominal pain     #Acute Crohn's flare  Previously on Humira however due to insurance issues transitioned to Hyrimoz (adalimumab-adaz). Last dose 2 weeks ago  CT abdomen pelvis consistent with acute Crohn's flare centered at terminal ileum.  There is inflammatory mass centered in the adjacent right lower quadrant mesentery with ill-defined tracts versus developing complex fistula  GI and CRS following.  Repeat CT abdomen pelvis reviewed  On IV Zosyn  Continue IV fluids  Continue current pain control Zofran as needed  Culture data nonrevealing thus far  Advance diet per surgery/GI     #Mild JALEEL-resolved  Creatinine improved  2.8  Continue IV fluids  BMP daily     #Electrolyte abnormalities  Replace recheck     #Incidental finding  CT abdomen pelvis showed mixed density 5.5 cm right ovarian lesion containing macroscopic fat, soft tissue density and calcifications compatible with ovarian dermoid     Outpatient follow-up with PCP    DVT prophylaxis:  Medical and mechanical DVT prophylaxis orders are present.         Code status is   Code Status and Medical Interventions:   Ordered at: 04/24/24 0629     Code Status (Patient has no pulse and is not breathing):    CPR (Attempt to Resuscitate)     Medical Interventions (Patient has pulse or is breathing):    Full Support       Plan for disposition: Anticipate discharge in 2 days    Time: 30 minutes    Signature: Electronically signed by Carmelo Richards MD, 04/28/24, 12:29 EDT.  [facility] Hospitalist Team

## 2024-04-29 VITALS
OXYGEN SATURATION: 98 % | BODY MASS INDEX: 39.45 KG/M2 | SYSTOLIC BLOOD PRESSURE: 129 MMHG | HEIGHT: 67 IN | TEMPERATURE: 96.6 F | DIASTOLIC BLOOD PRESSURE: 75 MMHG | HEART RATE: 61 BPM | RESPIRATION RATE: 14 BRPM | WEIGHT: 251.32 LBS

## 2024-04-29 LAB
ALBUMIN SERPL-MCNC: 3.9 G/DL (ref 3.5–5.2)
ALBUMIN/GLOB SERPL: 1 G/DL
ALP SERPL-CCNC: 94 U/L (ref 39–117)
ALT SERPL W P-5'-P-CCNC: 46 U/L (ref 1–33)
ANION GAP SERPL CALCULATED.3IONS-SCNC: 13 MMOL/L (ref 5–15)
AST SERPL-CCNC: 16 U/L (ref 1–32)
BACTERIA SPEC AEROBE CULT: NORMAL
BACTERIA SPEC AEROBE CULT: NORMAL
BILIRUB SERPL-MCNC: 0.5 MG/DL (ref 0–1.2)
BUN SERPL-MCNC: 2 MG/DL (ref 6–20)
BUN/CREAT SERPL: 2.9 (ref 7–25)
CALCIUM SPEC-SCNC: 9.9 MG/DL (ref 8.6–10.5)
CHLORIDE SERPL-SCNC: 103 MMOL/L (ref 98–107)
CO2 SERPL-SCNC: 22 MMOL/L (ref 22–29)
CREAT SERPL-MCNC: 0.7 MG/DL (ref 0.57–1)
EGFRCR SERPLBLD CKD-EPI 2021: 121 ML/MIN/1.73
GLOBULIN UR ELPH-MCNC: 3.8 GM/DL
GLUCOSE SERPL-MCNC: 111 MG/DL (ref 65–99)
MAGNESIUM SERPL-MCNC: 2.7 MG/DL (ref 1.6–2.6)
PHOSPHATE SERPL-MCNC: 4.3 MG/DL (ref 2.5–4.5)
POTASSIUM SERPL-SCNC: 4.5 MMOL/L (ref 3.5–5.2)
PROT SERPL-MCNC: 7.7 G/DL (ref 6–8.5)
SODIUM SERPL-SCNC: 138 MMOL/L (ref 136–145)

## 2024-04-29 PROCEDURE — 63710000001 ONDANSETRON ODT 4 MG TABLET DISPERSIBLE: Performed by: STUDENT IN AN ORGANIZED HEALTH CARE EDUCATION/TRAINING PROGRAM

## 2024-04-29 PROCEDURE — 99232 SBSQ HOSP IP/OBS MODERATE 35: CPT | Performed by: STUDENT IN AN ORGANIZED HEALTH CARE EDUCATION/TRAINING PROGRAM

## 2024-04-29 PROCEDURE — 84100 ASSAY OF PHOSPHORUS: CPT | Performed by: HOSPITALIST

## 2024-04-29 PROCEDURE — 63710000001 PREDNISONE PER 1 MG: Performed by: NURSE PRACTITIONER

## 2024-04-29 PROCEDURE — 25010000002 METHYLPREDNISOLONE PER 40 MG: Performed by: NURSE PRACTITIONER

## 2024-04-29 PROCEDURE — 25010000002 HEPARIN (PORCINE) PER 1000 UNITS: Performed by: HOSPITALIST

## 2024-04-29 PROCEDURE — 83735 ASSAY OF MAGNESIUM: CPT | Performed by: HOSPITALIST

## 2024-04-29 PROCEDURE — 80053 COMPREHEN METABOLIC PANEL: CPT | Performed by: STUDENT IN AN ORGANIZED HEALTH CARE EDUCATION/TRAINING PROGRAM

## 2024-04-29 PROCEDURE — 25010000002 PIPERACILLIN SOD-TAZOBACTAM PER 1 G: Performed by: STUDENT IN AN ORGANIZED HEALTH CARE EDUCATION/TRAINING PROGRAM

## 2024-04-29 RX ORDER — PREDNISONE 20 MG/1
40 TABLET ORAL
Qty: 28 TABLET | Refills: 0 | Status: SHIPPED | OUTPATIENT
Start: 2024-04-30 | End: 2024-05-14

## 2024-04-29 RX ORDER — DICYCLOMINE HYDROCHLORIDE 10 MG/1
20 CAPSULE ORAL 3 TIMES DAILY
Qty: 18 CAPSULE | Refills: 0 | Status: SHIPPED | OUTPATIENT
Start: 2024-04-29 | End: 2024-05-02

## 2024-04-29 RX ORDER — CIPROFLOXACIN 500 MG/1
500 TABLET, FILM COATED ORAL 2 TIMES DAILY
Qty: 10 TABLET | Refills: 0 | Status: SHIPPED | OUTPATIENT
Start: 2024-04-29 | End: 2024-05-04

## 2024-04-29 RX ORDER — PREDNISONE 20 MG/1
40 TABLET ORAL
Status: DISCONTINUED | OUTPATIENT
Start: 2024-04-29 | End: 2024-04-29 | Stop reason: HOSPADM

## 2024-04-29 RX ORDER — HYDROCODONE BITARTRATE AND ACETAMINOPHEN 5; 325 MG/1; MG/1
1 TABLET ORAL EVERY 8 HOURS PRN
Qty: 9 TABLET | Refills: 0 | Status: SHIPPED | OUTPATIENT
Start: 2024-04-29 | End: 2024-05-02

## 2024-04-29 RX ORDER — METRONIDAZOLE 500 MG/1
500 TABLET ORAL 3 TIMES DAILY
Qty: 15 TABLET | Refills: 0 | Status: SHIPPED | OUTPATIENT
Start: 2024-04-29 | End: 2024-05-04

## 2024-04-29 RX ADMIN — PIPERACILLIN AND TAZOBACTAM 3.38 G: 3; .375 INJECTION, POWDER, FOR SOLUTION INTRAVENOUS at 05:29

## 2024-04-29 RX ADMIN — ONDANSETRON 4 MG: 4 TABLET, ORALLY DISINTEGRATING ORAL at 05:29

## 2024-04-29 RX ADMIN — PREDNISONE 40 MG: 20 TABLET ORAL at 11:15

## 2024-04-29 RX ADMIN — METHYLPREDNISOLONE SODIUM SUCCINATE 20 MG: 40 INJECTION, POWDER, FOR SOLUTION INTRAMUSCULAR; INTRAVENOUS at 05:29

## 2024-04-29 RX ADMIN — HEPARIN SODIUM 5000 UNITS: 5000 INJECTION INTRAVENOUS; SUBCUTANEOUS at 05:29

## 2024-04-29 RX ADMIN — Medication 10 ML: at 08:39

## 2024-04-29 RX ADMIN — DICYCLOMINE HYDROCHLORIDE 20 MG: 10 CAPSULE ORAL at 08:39

## 2024-04-29 NOTE — CASE MANAGEMENT/SOCIAL WORK
Case Management Discharge Note      Final Note: Home         Selected Continued Care - Discharged on 4/29/2024 Admission date: 4/24/2024 - Discharge disposition: Home or Self Care       Transportation Services  Private: Car    Final Discharge Disposition Code: 01 - home or self-care    JERSEY Hugo RN  SIPS/ICU   O: [phone]  C: [phone]  [email or web address]

## 2024-04-29 NOTE — PROGRESS NOTES
"Nutrition Services    Patient Name: Carin Price  YOB: 1995  MRN: [id number]  Admission date: 4/24/2024    PROGRESS NOTE      Encounter Information: Checking in on diet advancement. Diet advanced to fiber restricted today, 4/29.        PO Diet: Diet: Gastrointestinal; Fiber-Restricted; Fluid Consistency: Thin (IDDSI 0)   PO Supplements: Boost Plus BID (Provides 720 kcals, 28 g protein if consumed)      PO Intake:  4/29: no intake recorded since diet advanced to fiber restricted - infant on liquids/NPO prior to this       Current nutrition support: -   Nutrition support review: -       Labs (reviewed below): Reviewed, management per attending         GI Function:  + BM on 4/28        Nutrition Intervention Updates: Continue current diet and encourage good PO intake.       Results from last 7 days   Lab Units 04/29/24 0408 04/28/24 0614 04/27/24 0222   SODIUM mmol/L 138 137 138   POTASSIUM mmol/L 4.5 4.1 4.0   CHLORIDE mmol/L 103 104 103   CO2 mmol/L 22.0 25.0 24.0   BUN mg/dL 2* 3* 5*   CREATININE mg/dL 0.70 0.74 0.80   CALCIUM mg/dL 9.9 8.9 8.7   BILIRUBIN mg/dL 0.5 0.5 1.1   ALK PHOS U/L 94 81 101   ALT (SGPT) U/L 46* 44* 61*   AST (SGOT) U/L 16 14 21   GLUCOSE mg/dL 111* 95 97     Results from last 7 days   Lab Units 04/29/24  0408 04/28/24  0759 04/28/24 0614 04/27/24 0222   MAGNESIUM mg/dL 2.7*  --  2.6 2.3   PHOSPHORUS mg/dL 4.3  --  3.7 3.2   HEMOGLOBIN g/dL  --  12.5  --  11.6*   HEMATOCRIT %  --  38.3  --  35.4     COVID19   Date Value Ref Range Status   08/11/2021 Not Detected Not Detected - Ref. Range Final     No results found for: \"HGBA1C\"    RD to follow up per protocol.    Electronically signed by:  Alecia Chang RD  04/29/24 09:48 EDT    "

## 2024-04-29 NOTE — PAYOR COMM NOTE
"Carin Callahan (28 y.o. Female)  1995  REF #[id number]   DC NOTICE - DC'D 24 ROUTINE TO HOME    AUTHORIZATION PENDING -   PLEASE FORWARD DETERMINATION TO FOLLOWING CONTACT:    KATELIN LAY LPN UR  Utilization Review Nurse  [facility]  Direct & confidential phone # [phone]  Fax # [phone]        Date of Birth   1995    Social Security Number       Address   [address] [place] [address]    Home Phone   [phone]    MRN   [id number]       [employer]   Unknown    Marital Status                               Admission Date   24    Admission Type   Emergency    Admitting Provider   Carmelo Richards MD    Attending Provider       Department, Room/Bed   [facility] SURGICAL INPATIENT, /       Discharge Date   2024    Discharge Disposition   Home or Self Care    Discharge Destination   Home                              Attending Provider: (none)   Allergies: No Known Allergies    Isolation: None   Infection: None   Code Status: Prior    Ht: 170.2 cm (67\")   Wt: 114 kg (251 lb 5.2 oz)    Admission Cmt: None   Principal Problem: Crohn disease [K50.90]                   Active Insurance as of 2024       Primary Coverage       Payor Plan Insurance Group Employer/Plan Group    ANTH [facility] [employer]UE [facility] PPO [id number]       Payor Plan Address Payor Plan Phone Number Payor Plan Fax Number Effective Dates    [address] [phone]  2023 - None Entered    [place] [id number]         Subscriber Name Subscriber Birth Date Member ID       BEAR CALLAHAN T 3/6/1994 [id number]                     Emergency Contacts        (Rel.) Home Phone Work Phone Mobile Phone    BEAR CALLAHAN (Spouse) [phone] -- [phone]    MAYUR CONDE (Mother) [phone] -- [phone]                 Discharge Summary        Carmelo Richards MD at 24 [address] [place] [address] " Medicine Services  Discharge Summary    Date of Service: 24  Patient Name: Carin Price  : 1995  MRN: [id number]    Date of Admission: 2024  Discharge Diagnosis: #Acute Crohn's flare with microperforation and concern for fistula  #Mild JALEEL-resolved  #Electrolyte normalities    Date of Discharge:  24  Primary Care Physician: Thien Reagan MD      Presenting Problem:   Crohn disease [K50.90]  Abdominal pain, unspecified abdominal location [R10.9]  Crohn's disease of ileum with other complication [K50.018]    Active and Resolved Hospital Problems:  Active Hospital Problems    Diagnosis POA    **Crohn disease [K50.90] Yes      Resolved Hospital Problems   No resolved problems to display.         Hospital Course     HPI:  Admitting team HPI   Carin Price is a 28 y.o. female with PMHx of Crohn's disease presented to [facility] for abdominal pain.  Admits to 7/10 sharp abdomonial pain that is worse in RLQ that started more acutely today when she was lifting items at work and developed acute abdominal pain.  States her Crohn's has been flaring off and on for the past several months but this is worse than normal.  Symptoms complicated by nausea and vomiting.  States she had an abscess in  and it presented in a similar fashion.  She called her GI office who recommended she go to [facility] for evalaution.         Hospital Course:  28-year-old female with history of Crohn's disease previously on Humira admitted to [facility] [place]  with worsening right lower quadrant abdominal pain     #Acute Crohn's flare with microperforation and concern for fistula   Previously on Humira however due to insurance issues transitioned to Hyrimoz (adalimumab-adaz). Last dose 2 weeks ago  CT abdomen pelvis consistent with acute Crohn's flare centered at terminal ileum.  There is inflammatory mass centered in the adjacent right lower quadrant mesentery with ill-defined tracts versus developing complex fistula  Seen by GI and  CRS. Appreciate reccs.  Repeat CT abdomen pelvis reviewed    Started on IV Zosyn will transition to p.o. Cipro and Flagyl to finish course  Pain controlled  Culture data nonrevealing thus far  Diet advanced per GI/surgery  Outpatient follow-up with GI and surgery  Started on IV Solu-Medrol transition to p.o. at discharge and 40 mg daily for 2 weeks after that taper per GI     #Mild JALEEL-resolved  Creatinine improved with IV fluids encourage p.o. intake     #Electrolyte abnormalities  Replaced     #Incidental finding  CT abdomen pelvis showed mixed density 5.5 cm right ovarian lesion containing macroscopic fat, soft tissue density and calcifications compatible with ovarian dermoid     Outpatient follow-up with PCP        DISCHARGE Follow Up Recommendations for labs and diagnostics: Outpatient follow-up with GI in colorectal surgery.  Prednisone 40 mg for 2 weeks followed by tapering dose per GI.      Reasons For Change In Medications and Indications for New Medications:      Day of Discharge     Vital Signs:  Temp:  [96.6 °F (35.9 °C)-98 °F (36.7 °C)] 96.6 °F (35.9 °C)  Heart Rate:  [61-79] 61  Resp:  [14-18] 14  BP: (129-133)/(75-81) 129/75    Physical Exam:  Physical Exam AOx3 NAD  RRR S1-S2 audible  Lungs CTA  Abdomen soft nontender nondistended      Pertinent  and/or Most Recent Results     LAB RESULTS:      Lab 04/28/24  0759 04/28/24  0614 04/27/24  0222 04/26/24  0154 04/25/24  0019 04/24/24  2131 04/24/24  1816   WBC 9.79  --  10.08 12.73* 14.09*  --  13.71*   HEMOGLOBIN 12.5  --  11.6* 12.5 12.4  --  13.4   HEMATOCRIT 38.3  --  35.4 39.2 37.8  --  41.8   PLATELETS 385  --  335 370 378  --  438   NEUTROS ABS  --   --   --  8.20*  --   --  8.61*   IMMATURE GRANS (ABS)  --   --   --  0.03  --   --  0.04   LYMPHS ABS  --   --   --  3.11*  --   --  3.71*   MONOS ABS  --   --   --  1.19*  --   --  1.22*   EOS ABS  --   --   --  0.17  --   --  0.09   MCV 86.3  --  86.3 86.9 85.9  --  87.1   SED RATE  --   --   --    --  28*  --   --    CRP  --  11.27* 13.06*  --  3.22*  --   --    LACTATE  --   --   --   --   --  0.9  --          Lab 04/29/24 0408 04/28/24  0614 04/27/24 0222 04/26/24 0154 04/25/24  0019   SODIUM 138 137 138 136 138   POTASSIUM 4.5 4.1 4.0 3.9 3.2*   CHLORIDE 103 104 103 99 103   CO2 22.0 25.0 24.0 26.0 24.0   ANION GAP 13.0 8.0 11.0 11.0 11.0   BUN 2* 3* 5* 7 11   CREATININE 0.70 0.74 0.80 1.02* 0.73   EGFR 121.0 113.2 103.1 77.0 115.0   GLUCOSE 111* 95 97 81 83   CALCIUM 9.9 8.9 8.7 9.0 8.8   MAGNESIUM 2.7* 2.6 2.3 2.3  --    PHOSPHORUS 4.3 3.7 3.2 3.9  --          Lab 04/29/24 0408 04/28/24 0614 04/27/24 0222 04/26/24  0154 04/25/24  0019 04/24/24 1816   TOTAL PROTEIN 7.7 6.2 6.4 6.8 6.7 7.7   ALBUMIN 3.9 3.2* 3.2* 3.5 3.5 4.0   GLOBULIN 3.8 3.0 3.2 3.3 3.2 3.7   ALT (SGPT) 46* 44* 61* 72* 69* 77*   AST (SGOT) 16 14 21 26 23 24   BILIRUBIN 0.5 0.5 1.1 1.1 0.6 0.3   ALK PHOS 94 81 101 87 85 92   LIPASE  --   --   --   --   --  31                     Brief Urine Lab Results  (Last result in the past 365 days)        Color   Clarity   Blood   Leuk Est   Nitrite   Protein   CREAT   Urine HCG        04/24/24 1816               Negative       04/24/24 1816 Yellow   Clear   Negative   Negative   Negative   Trace                 Microbiology Results (last 10 days)       Procedure Component Value - Date/Time    Blood Culture - Blood, Arm, Left [[id number]]  (Normal) Collected: 04/24/24 2127    Lab Status: Preliminary result Specimen: Blood from Arm, Left Updated: 04/28/24 2131     Blood Culture No growth at 4 days    Blood Culture - Blood, Arm, Right [[id number]]  (Normal) Collected: 04/24/24 2127    Lab Status: Preliminary result Specimen: Blood from Arm, Right Updated: 04/28/24 2131     Blood Culture No growth at 4 days            CT Abdomen Pelvis With Contrast    Result Date: 4/28/2024  Impression: Impression: Increased inflammatory change about the terminal ileum in keeping with Crohn's related terminal  ileitis. Redemonstrated masslike phlegmon adjacent to the terminal ileum with suggestion of developing peripheral rim enhancement which could represent small early abscess formation. This only measures up to 1.6 cm and would not be amenable to drainage at this time. No evidence of perforation or new extraluminal gas. Hepatic steatosis. Unchanged right ovarian mature cystic teratoma. Electronically Signed: Saulo Brandt MD  4/28/2024 11:43 AM EDT  Workstation ID: [id number]    XR Abdomen KUB    Result Date: 4/28/2024  Impression: Impression: No acute abnormality Electronically Signed: Jeff Herrera MD  4/28/2024 4:56 AM EDT  Workstation ID: [id number]    CT Abdomen Pelvis With Contrast    Result Date: 4/24/2024  Impression: Impression: 1. CT findings consistent with active Crohn's flare centered at the terminal ileum. There is an inflammatory mass centered in the adjacent right lower quadrant mesentery with ill-defined tracts versus developing complex fistula and at least 1 tiny focus of extraluminal gas (coronal image 60 series 4). No drainable collection/abscess. No associated bowel obstruction. 2. 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 3. Hepatic steatosis. Electronically Signed: Kevin Quintana MD  4/24/2024 8:02 PM EDT  Workstation ID: [id number]                 Labs Pending at Discharge:  Pending Labs       Order Current Status    QuantiFERON-TB Gold Plus In process    Blood Culture - Blood, Arm, Left Preliminary result    Blood Culture - Blood, Arm, Right Preliminary result            Procedures Performed           Consults:   Consults       Date and Time Order Name Status Description    4/24/2024 10:13 PM Inpatient Colorectal Surgery Consult Completed     4/24/2024  8:58 PM Hospitalist (on-call MD unless specified)      4/24/2024  8:09 PM Gastroenterology (on-call MD unless specified) Completed               Discharge Details        Discharge Medications        New  Medications        Instructions Start Date   ciprofloxacin 500 MG tablet  Commonly known as: Cipro   500 mg, Oral, 2 Times Daily      dicyclomine 10 MG capsule  Commonly known as: BENTYL   20 mg, Oral, 3 Times Daily      HYDROcodone-acetaminophen 5-325 MG per tablet  Commonly known as: NORCO   1 tablet, Oral, Every 8 Hours PRN      metroNIDAZOLE 500 MG tablet  Commonly known as: Flagyl   500 mg, Oral, 3 Times Daily             Changes to Medications        Instructions Start Date   predniSONE 20 MG tablet  Commonly known as: DELTASONE  What changed:   how much to take  when to take this   40 mg, Oral, Daily With Breakfast   Start Date: April 30, 2024            Continue These Medications        Instructions Start Date   HYRIMOZ SC   Subcutaneous, Every 14 Days               No Known Allergies      Discharge Disposition: Home  Home or Self Care    Diet:  Hospital:  Diet Order   Procedures    Diet: Gastrointestinal; Fiber-Restricted; Fluid Consistency: Thin (IDDSI 0)         Discharge Activity:         CODE STATUS:  Code Status and Medical Interventions:   Ordered at: 04/24/24 5331     Code Status (Patient has no pulse and is not breathing):    CPR (Attempt to Resuscitate)     Medical Interventions (Patient has pulse or is breathing):    Full Support         Future Appointments   Date Time Provider Department Center   5/8/2024  2:30 PM Sachin Murphy MD [facility]           Time spent on Discharge including face to face service:  >30 minutes    Signature: Electronically signed by Carmelo Richards MD, 04/29/24, 12:18 EDT.  [facility] Hospitalist Team      Electronically signed by Carmelo Richards MD at 04/29/24 5547

## 2024-04-29 NOTE — PLAN OF CARE
Goal Outcome Evaluation:  Plan of Care Reviewed With: patient        Progress: improving  Outcome Evaluation: Patient discharging today

## 2024-04-29 NOTE — PAYOR COMM NOTE
"Carin Callahan (28 y.o. Female)  1995  Ref #[id number]   Clinical UD- Pt remains in Hospital 24    AUTHORIZATION PENDING  PLEASE FORWARD DETERMINATION TO FOLLOWING CONTACT:    KATELIN LAY LPN UR  Utilization Review Nurse  [facility]  Direct & confidential phone # [phone]  Fax # [phone]        Date of Birth   1995    Social Security Number       Address   [facility] [address] [place] [address]    Home Phone   [phone]    MRN   [id number]       Anglican   Unknown    Marital Status                               Admission Date   24    Admission Type   Emergency    Admitting Provider   Carmelo Richards MD    Attending Provider   Carmelo Richards MD    Department, Room/Bed   [facility] SURG[facility] INPATIENT,        Discharge Date       Discharge Disposition       Discharge Destination                                 Attending Provider: Carmelo Richards MD    Allergies: No Known Allergies    Isolation: None   Infection: None   Code Status: CPR    Ht: 170.2 cm (67\")   Wt: 114 kg (251 lb 5.2 oz)    Admission Cmt: None   Principal Problem: Crohn disease [K50.90]                   Active Insurance as of 2024       Primary Coverage       Payor Plan Insurance Group Employer/Plan Group    [facility] [employer] [facility] [id number] [facility] PPO [id number]       Payor Plan Address Payor Plan Phone Number Payor Plan Fax Number Effective Dates    [address] [phone]  2023 - None Entered    [place] [id number]         Subscriber Name Subscriber Birth Date Member ID       BEAR CALLAHAN T 3/6/1994 [id number]                     Emergency Contacts        (Rel.) Home Phone Work Phone Mobile Phone    BEAR CALLAHAN (Spouse) [phone] -- [phone]    MAYUR CONDE (Mother) [phone] -- [phone]                 Physician Progress Notes (last 72 hours)        Chika Elliott APRN at 24 1247       Attestation signed " "by Hakan Lindsey MD at 04/28/24 5682    I have reviewed this documentation and agree.  I have personally seen the patient with nurse practitioner and agree with plan.  I have reviewed labs and imaging.  I also performed an appropriate and complete medical decision making component to the history and physical.  Patient was having worse right lower quadrant abdominal pain and had repeat CT that suggest some phlegmon but nothing to suggest worsening perforation.  On exam her abdomen is soft with mild right lower quadrant tenderness and she appears comfortable.  Will start low-dose Solu-Medrol 20 mg twice daily and continue Bentyl.  Continue Zosyn.  Plan is to try to initiate Skyrizi upon discharge.  Will follow.                   LOS: 3 days   Patient Care Team:  Thien Reagan MD as PCP - General (Gastroenterology)      Subjective   \"What about my pain regimen?     Interval History:   Labs: Sodium potassium and creatinine are all normal.  ALT remains elevated but down to 44.  C-reactive protein is down to 11.27 (13.86).  WBCs 9.75, hemoglobin 12.5, platelets 385.    KUB last night showed no acute abnormality.  Nonobstructive bowel gas pattern.  Moderate stool burden.  CT of the abdomen and pelvis with contrast showed increased inflammatory changes around the terminal ileum in keeping with Crohn's related terminal ileitis.  Phlegmon but no drainable pockets.  No evidence of perforation or new extraluminal gas.  Hepatic steatosis.  Multiple conversations overnight regarding patient's pain.  Evidently she was having intractable pain and was inconsolable.  Patient was requesting Dilaudid to be increased.  Tried 1 dose of Toradol without relief.  Placed her on Norco 5 mg with little relief.  Eventually increased her Dilaudid to 1 mg every 2 hours.  Nursing staff reports that she slept between requesting pain medications. Patient states pain is in right & left upper quadrants & waxes & wanes. Pain has been 7/10 a " couple of times but never less than that. She is always pleasantly smiling in bed.   Case discussed with Dr. Murphy this morning.  Repeat CT results as noted above.  Plan is to continue antibiotics and hoping to avoid surgery.     ROS:   Abdominal pain   No chest pain, shortness of breath, or cough.         Medication Review:     Current Facility-Administered Medications:     Calcium Replacement - Follow Nurse / BPA Driven Protocol, , Does not apply, PRN, Dana Agudelo DO    dicyclomine (BENTYL) capsule 20 mg, 20 mg, Oral, TID, Chika Elliott APRN, 20 mg at 04/28/24 0805    heparin (porcine) 5000 UNIT/ML injection 5,000 Units, 5,000 Units, Subcutaneous, Q8H, Carmelo Richards MD, 5,000 Units at 04/28/24 0602    HYDROcodone-acetaminophen (NORCO) 5-325 MG per tablet 1 tablet, 1 tablet, Oral, Q6H PRN, Chika Elliott APRN, 1 tablet at 04/28/24 1032    HYDROmorphone (DILAUDID) injection 1 mg, 1 mg, Intravenous, Q2H PRN, Chika Elliott APRN, 1 mg at 04/28/24 0620    lactated ringers infusion, 150 mL/hr, Intravenous, Continuous, Carmelo Richards MD, Last Rate: 150 mL/hr at 04/28/24 1032, 150 mL/hr at 04/28/24 1032    Magnesium Standard Dose Replacement - Follow Nurse / BPA Driven Protocol, , Does not apply, PRN, Dana Agudelo DO    ondansetron ODT (ZOFRAN-ODT) disintegrating tablet 4 mg, 4 mg, Oral, Q6H PRN **OR** ondansetron (ZOFRAN) injection 4 mg, 4 mg, Intravenous, Q6H PRN, Dana Agudelo DO, 4 mg at 04/28/24 0621    Pharmacy to Dose Zosyn, , Does not apply, Continuous PRN, Dana Agudelo DO    Phosphorus Replacement - Follow Nurse / BPA Driven Protocol, , Does not apply, PRN, Dana Agudelo DO    piperacillin-tazobactam (ZOSYN) 3.375 g IVPB in 100 mL NS MBP (CD), 3.375 g, Intravenous, Q8H, Dana Agudelo DO, Last Rate: 0 mL/hr at 04/27/24 0105, 3.375 g at 04/28/24 0602    Potassium Replacement - Follow Nurse / BPA Driven Protocol, , Does not apply, PRN, Dana Agudelo DO    prochlorperazine  (COMPAZINE) injection 10 mg, 10 mg, Intravenous, Q6H PRN, Jammie, Aramman, DO, 10 mg at 04/28/24 1052    [COMPLETED] Insert Peripheral IV, , , Once **AND** sodium chloride 0.9 % flush 10 mL, 10 mL, Intravenous, PRN, Jammie, Waitman, DO    sodium chloride 0.9 % flush 10 mL, 10 mL, Intravenous, Q12H, Jammie, Waitman, DO, 10 mL at 04/28/24 0805    sodium chloride 0.9 % flush 10 mL, 10 mL, Intravenous, PRN, Jammie, Waitman, DO    sodium chloride 0.9 % infusion 40 mL, 40 mL, Intravenous, PRN, Jammie, Waitman, DO      Objective  sitting up in bed.  No family present.  NAD.    Vital Signs  Temp:  [97.5 °F (36.4 °C)-99.1 °F (37.3 °C)] 97.6 °F (36.4 °C)  Heart Rate:  [70-83] 83  Resp:  [14-16] 16  BP: (107-133)/(69-89) 107/70  Physical Exam:    General Appearance:    Awake and alert, in no acute distress   Head:    Normocephalic, without obvious abnormality   Eyes:          Conjunctivae normal, anicteric sclerae   Ears:    Hearing intact   Throat:   No oral lesions, no thrush, oral mucosa moist   Neck:   No adenopathy, supple, no JVD   Lungs:      respirations regular, even and unlabored        Abdomen:      soft, non-tender, no rebound or guarding, non-distended, no hepatosplenomegaly   Rectal:     Deferred   Extremities:   No edema, no cyanosis, no redness   Skin:   No bleeding, bruising or rash, no jaundice   Neurologic:   Cranial nerves 2 - 12 grossly intact, no asterixis, sensation   intact        Results Review:    CBC    Results from last 7 days   Lab Units 04/28/24  0759 04/27/24  0222 04/26/24  0154 04/25/24  0019 04/24/24  1816   WBC 10*3/mm3 9.79 10.08 12.73* 14.09* 13.71*   HEMOGLOBIN g/dL 12.5 11.6* 12.5 12.4 13.4   PLATELETS 10*3/mm3 385 335 370 378 438     CMP   Results from last 7 days   Lab Units 04/28/24  0614 04/27/24  0222 04/26/24  0154 04/25/24  0019 04/24/24  1816   SODIUM mmol/L 137 138 136 138 136   POTASSIUM mmol/L 4.1 4.0 3.9 3.2* 3.4*   CHLORIDE mmol/L 104 103 99 103 100   CO2 mmol/L 25.0 24.0  "26.0 24.0 26.0   BUN mg/dL 3* 5* 7 11 13   CREATININE mg/dL 0.74 0.80 1.02* 0.73 0.78   GLUCOSE mg/dL 95 97 81 83 83   ALBUMIN g/dL 3.2* 3.2* 3.5 3.5 4.0   BILIRUBIN mg/dL 0.5 1.1 1.1 0.6 0.3   ALK PHOS U/L 81 101 87 85 92   AST (SGOT) U/L 14 21 26 23 24   ALT (SGPT) U/L 44* 61* 72* 69* 77*   MAGNESIUM mg/dL 2.6 2.3 2.3  --   --    PHOSPHORUS mg/dL 3.7 3.2 3.9  --   --    LIPASE U/L  --   --   --   --  31     Cr Clearance Estimated Creatinine Clearance: 147.6 mL/min (by C-G formula based on SCr of 0.74 mg/dL).  Coag     HbA1C No results found for: \"HGBA1C\"  Blood Glucose No results found for: \"POCGLU\"  Infection   Results from last 7 days   Lab Units 04/24/24  2127   BLOODCX  No growth at 3 days  No growth at 3 days     UA    Results from last 7 days   Lab Units 04/24/24  1816   NITRITE UA  Negative     Radiology(recent) CT Abdomen Pelvis With Contrast    Result Date: 4/28/2024  Impression: Increased inflammatory change about the terminal ileum in keeping with Crohn's related terminal ileitis. Redemonstrated masslike phlegmon adjacent to the terminal ileum with suggestion of developing peripheral rim enhancement which could represent small early abscess formation. This only measures up to 1.6 cm and would not be amenable to drainage at this time. No evidence of perforation or new extraluminal gas. Hepatic steatosis. Unchanged right ovarian mature cystic teratoma. Electronically Signed: Saulo Brandt MD  4/28/2024 11:43 AM EDT  Workstation ID: [id number]    XR Abdomen KUB    Result Date: 4/28/2024  Impression: No acute abnormality Electronically Signed: Jeff Herrera MD  4/28/2024 4:56 AM EDT  Workstation ID: [id number]           Assessment & Plan   28-year-old female with ileocolonic Crohn's disease previously on Humira but lost insurance coverage in January 2024 and off medication presented 4/24/2024 with worsening abdominal pain and CT suggests inflammatory changes at the terminal ileum with microperforation and " fistula.     Crohn's exacerbation with inflammation at terminal ileum/microperforation/fistula  Right-sided abdominal pain  Nausea/vomiting  Leukocytosis  Previous Crohn's related abscess  Elevated ALT/hepatic steatosis         Plan:  28-year-old female with history of Crohn's and previously on Humira lost insurance coverage 2024 was off medication.  Symptoms have been flaring over the past few months.  She has completed prednisone taper as of yesterday and just 2 weeks ago started Humira biosimilar.  She has history of Crohn's related abscess and was hospitalized in the past. Current CT shows active Crohn's in the terminal ileum with inflammatory mass and microperforation as well as developing complex fistula.     Pain control issues. She states Norcos are not helping. Dilaudid 1mg is controlling until she can get another dose.  States she has shared her pain issues with Dr. Murphy.   Patient was not started on steroids initially due to concern for fistula or abscess.   CRP is down today & toradol did not help. Will start low dose solumedrol just twice a day & see how she does.   Repeat CT did not indicate anything acute.  Dr. Murphy and myself discussed treatment plan and results.  Continue Bentyl.  She has to get off of Dilaudid and have pain controlled by p.o. pain medications if she wants to be discharged.  Patient continues to complain of pain when she drinks her clear liquid tray.  Continue Zosyn.  Checking on Skyrizi as an outpatient.         Chika Emmie Elliott, GASTON  24  12:47 EDT              Electronically signed by Hakan Lindsey MD at 24 7623       Carmelo Richards MD at 24 [facility]              [facility] MEDICINE SERVICE  DAILY PROGRESS NOTE    NAME: Carin Price  : 1995  MRN: [id number]      LOS: 3 days     PROVIDER OF SERVICE: Carmelo Richards MD    Chief Complaint: Crohn disease    Subjective:     Interval History:  History taken from: patient chart RN  Laying  comfortably in bed pain controlled afebrile    Review of Systems:   Review of Systems  All negative except as above  Objective:     Vital Signs  Temp:  [97.5 °F (36.4 °C)-99.1 °F (37.3 °C)] 97.6 °F (36.4 °C)  Heart Rate:  [70-83] 83  Resp:  [14-16] 16  BP: (107-133)/(69-89) 107/70   Body mass index is 39.36 kg/m².    Physical Exam  Physical Exam  AOx3 NAD  RRR S1 and S2  Lungs CTA  Abdomen soft nontender nondistended  Scheduled Meds   dicyclomine, 20 mg, Oral, TID  heparin (porcine), 5,000 Units, Subcutaneous, Q8H  piperacillin-tazobactam, 3.375 g, Intravenous, Q8H  sodium chloride, 10 mL, Intravenous, Q12H       PRN Meds     Calcium Replacement - Follow Nurse / BPA Driven Protocol    HYDROcodone-acetaminophen    HYDROmorphone    Magnesium Standard Dose Replacement - Follow Nurse / BPA Driven Protocol    ondansetron ODT **OR** ondansetron    Pharmacy to Dose Zosyn    Phosphorus Replacement - Follow Nurse / BPA Driven Protocol    Potassium Replacement - Follow Nurse / BPA Driven Protocol    prochlorperazine    [COMPLETED] Insert Peripheral IV **AND** sodium chloride    sodium chloride    sodium chloride   Infusions  lactated ringers, 150 mL/hr, Last Rate: 150 mL/hr (04/28/24 1032)  Pharmacy to Dose Zosyn,           Diagnostic Data    Results from last 7 days   Lab Units 04/28/24  0759 04/28/24  0614   WBC 10*3/mm3 9.79  --    HEMOGLOBIN g/dL 12.5  --    HEMATOCRIT % 38.3  --    PLATELETS 10*3/mm3 385  --    GLUCOSE mg/dL  --  95   CREATININE mg/dL  --  0.74   BUN mg/dL  --  3*   SODIUM mmol/L  --  137   POTASSIUM mmol/L  --  4.1   AST (SGOT) U/L  --  14   ALT (SGPT) U/L  --  44*   ALK PHOS U/L  --  81   BILIRUBIN mg/dL  --  0.5   ANION GAP mmol/L  --  8.0       CT Abdomen Pelvis With Contrast    Result Date: 4/28/2024  Impression: Increased inflammatory change about the terminal ileum in keeping with Crohn's related terminal ileitis. Redemonstrated masslike phlegmon adjacent to the terminal ileum with suggestion of  developing peripheral rim enhancement which could represent small early abscess formation. This only measures up to 1.6 cm and would not be amenable to drainage at this time. No evidence of perforation or new extraluminal gas. Hepatic steatosis. Unchanged right ovarian mature cystic teratoma. Electronically Signed: Saulo Brandt MD  4/28/2024 11:43 AM EDT  Workstation ID: [id number]    XR Abdomen KUB    Result Date: 4/28/2024  Impression: No acute abnormality Electronically Signed: Jeff Herrera MD  4/28/2024 4:56 AM EDT  Workstation ID: [id number]       I reviewed the patient's new clinical results.  I reviewed the patient's new imaging results and agree with the interpretation.    Assessment/Plan:     Active and Resolved Problems  Active Hospital Problems    Diagnosis  POA    **Crohn disease [K50.90]  Yes      Resolved Hospital Problems   No resolved problems to display.       28-year-old female with history of Crohn's disease previously on Humira admitted to [facility] 4/24 with worsening right lower quadrant abdominal pain     #Acute Crohn's flare  Previously on Humira however due to insurance issues transitioned to Hyrimoz (adalimumab-adaz). Last dose 2 weeks ago  CT abdomen pelvis consistent with acute Crohn's flare centered at terminal ileum.  There is inflammatory mass centered in the adjacent right lower quadrant mesentery with ill-defined tracts versus developing complex fistula  GI and CRS following.  Repeat CT abdomen pelvis reviewed  On IV Zosyn  Continue IV fluids  Continue current pain control Zofran as needed  Culture data nonrevealing thus far  Advance diet per surgery/GI     #Mild JALEEL-resolved  Creatinine improved 2.8  Continue IV fluids  BMP daily     #Electrolyte abnormalities  Replace recheck     #Incidental finding  CT abdomen pelvis showed mixed density 5.5 cm right ovarian lesion containing macroscopic fat, soft tissue density and calcifications compatible with ovarian dermoid     Outpatient  follow-up with PCP    DVT prophylaxis:  Medical and mechanical DVT prophylaxis orders are present.         Code status is   Code Status and Medical Interventions:   Ordered at: 24 2213     Code Status (Patient has no pulse and is not breathing):    CPR (Attempt to Resuscitate)     Medical Interventions (Patient has pulse or is breathing):    Full Support       Plan for disposition: Anticipate discharge in 2 days    Time: 30 minutes    Signature: Electronically signed by Carmelo Richards MD, 24, 12:29 EDT.  [facility] Hospitalist Team      Electronically signed by Carmelo Richards MD at 24 1231       Sachin Murphy MD at 24 1130          Colorectal Surgery Progress Note    Pt. Name/Age/:  Carin Price   28 y.o.    1995         Med. Record Number:   [id number]  Date of admission:  2024      Service(s): Colorectal Surgery      Subjective    Doing better   Tolerating FLD   No nausea  Reports abdominal pain better   Afebrile and hemodynamically stable     Objective  Vitals:     Patient Vitals for the past 24 hrs:   BP Temp Temp src Pulse Resp SpO2   24 0426 117/81 97.5 °F (36.4 °C) Oral -- 16 95 %   24 0100 111/76 98.1 °F (36.7 °C) Oral 81 16 95 %   24 143/77 98.1 °F (36.7 °C) Oral 97 15 96 %   24 1258 136/91 98.4 °F (36.9 °C) Axillary 92 16 98 %           Wt. Admission: Weight: 113 kg (250 lb)     Wt. Current: Weight: 112 kg (246 lb 7.6 oz)   Body mass index is 38.6 kg/m².      I&O:  Intake/Output Summary (Last 24 hours) at 2024 1130  Last data filed at 2024 0539  Gross per 24 hour   Intake 1720 ml   Output 3500 ml   Net -1780 ml     1901 -  0700  In: 1840 [P.O.:840; I.V.:900]  Out: 4700 [Urine:4700]      Exam  General:  No acute distress  Head: Normocephalic, atraumatic  Neuro: Alert and oriented     Abdomen:  Soft, mild tender on right upper quadrant, non-distended, no masses, no hernias, no guarding, no diffuse peritoneal  sign.      Labs:     [unfilled]          Scheduled Meds:dicyclomine, 20 mg, Oral, TID  heparin (porcine), 5,000 Units, Subcutaneous, Q8H  piperacillin-tazobactam, 3.375 g, Intravenous, Q8H  sodium chloride, 10 mL, Intravenous, Q12H      Continuous Infusions:lactated ringers, 150 mL/hr, Last Rate: 150 mL/hr (24 0656)  Pharmacy to Dose Zosyn,       PRN Meds:.  Calcium Replacement - Follow Nurse / BPA Driven Protocol    HYDROmorphone    Magnesium Standard Dose Replacement - Follow Nurse / BPA Driven Protocol    ondansetron ODT **OR** ondansetron    Pharmacy to Dose Zosyn    Phosphorus Replacement - Follow Nurse / BPA Driven Protocol    Potassium Replacement - Follow Nurse / BPA Driven Protocol    prochlorperazine    [COMPLETED] Insert Peripheral IV **AND** sodium chloride    sodium chloride    sodium chloride          Assessment  28 y.o. female with chron's disease with acute on chronic terminal ileitis     Plan / Recommendations    - showing improvements clinically      - clear from surgical stand point for discharge. I will follow with her as outpatient.     - appreciate GI input. Management of immunosuppression per GI team.         11:30 EDT; 2024        Sachin Murphy MD  Colon and Rectal Surgery   [place]        Electronically signed by Sachin Murphy MD at 24 1133       Carmelo Richards MD at 24 1121              [facility] MEDICINE SERVICE  DAILY PROGRESS NOTE    NAME: Carin Price  : 1995  MRN: [id number]      LOS: 2 days     PROVIDER OF SERVICE: Carmelo Richards MD    Chief Complaint: Crohn disease    Subjective:     Interval History:  History taken from: patient chart RN  Afebrile pain controlled    Review of Systems:   Review of Systems  All negative except as above  Objective:     Vital Signs  Temp:  [97.5 °F (36.4 °C)-98.4 °F (36.9 °C)] 97.5 °F (36.4 °C)  Heart Rate:  [81-97] 81  Resp:  [15-16] 16  BP: (111-143)/(76-91) 117/81   Body mass index is 38.6  kg/m².    Physical Exam  Physical Exam  AOx3 NAD  RRR S1 and S2  Lungs CTA  Abdomen soft nontender nondistended  Scheduled Meds   dicyclomine, 20 mg, Oral, TID  heparin (porcine), 5,000 Units, Subcutaneous, Q8H  piperacillin-tazobactam, 3.375 g, Intravenous, Q8H  sodium chloride, 10 mL, Intravenous, Q12H       PRN Meds     Calcium Replacement - Follow Nurse / BPA Driven Protocol    HYDROmorphone    Magnesium Standard Dose Replacement - Follow Nurse / BPA Driven Protocol    ondansetron ODT **OR** ondansetron    Pharmacy to Dose Zosyn    Phosphorus Replacement - Follow Nurse / BPA Driven Protocol    Potassium Replacement - Follow Nurse / BPA Driven Protocol    prochlorperazine    [COMPLETED] Insert Peripheral IV **AND** sodium chloride    sodium chloride    sodium chloride   Infusions  lactated ringers, 150 mL/hr, Last Rate: 150 mL/hr (04/27/24 0656)  Pharmacy to Dose Zosyn,           Diagnostic Data    Results from last 7 days   Lab Units 04/27/24  0222   WBC 10*3/mm3 10.08   HEMOGLOBIN g/dL 11.6*   HEMATOCRIT % 35.4   PLATELETS 10*3/mm3 335   GLUCOSE mg/dL 97   CREATININE mg/dL 0.80   BUN mg/dL 5*   SODIUM mmol/L 138   POTASSIUM mmol/L 4.0   AST (SGOT) U/L 21   ALT (SGPT) U/L 61*   ALK PHOS U/L 101   BILIRUBIN mg/dL 1.1   ANION GAP mmol/L 11.0       No radiology results for the last day      I reviewed the patient's new clinical results.  I reviewed the patient's new imaging results and agree with the interpretation.    Assessment/Plan:     Active and Resolved Problems  Active Hospital Problems    Diagnosis  POA    **Crohn disease [K50.90]  Yes      Resolved Hospital Problems   No resolved problems to display.       28-year-old female with history of Crohn's disease previously on Humira admitted to [facility] 4/24 with worsening right lower quadrant abdominal pain     #Acute Crohn's flare  Previously on Humira however due to insurance issues transitioned to Hyrimoz (adalimumab-adaz). Last dose 2 weeks ago  CT  abdomen pelvis consistent with acute Crohn's flare centered at terminal ileum.  There is inflammatory mass centered in the adjacent right lower quadrant mesentery with ill-defined tracts versus developing complex fistula  GI and CRS following.  Noted plan to hold off on surgery  On IV Zosyn  Continue IV fluids  Continue current pain control Zofran as needed  Follow infectious workup  Advance diet per surgery     #Mild JALEEL-resolved  Creatinine improved 2.8  Continue IV fluids  BMP daily     #Electrolyte abnormalities  Replace recheck     #Incidental finding  CT abdomen pelvis showed mixed density 5.5 cm right ovarian lesion containing macroscopic fat, soft tissue density and calcifications compatible with ovarian dermoid     Outpatient follow-up with PCP    DVT prophylaxis:  Medical and mechanical DVT prophylaxis orders are present.         Code status is   Code Status and Medical Interventions:   Ordered at: 04/24/24 2213     Code Status (Patient has no pulse and is not breathing):    CPR (Attempt to Resuscitate)     Medical Interventions (Patient has pulse or is breathing):    Full Support       Plan for disposition: Anticipate discharge in 2 days    Time: 30 minutes    Signature: Electronically signed by Carmelo Richards MD, 04/27/24, 11:21 EDT.  [facility] Hospitalist Team      Electronically signed by Carmelo Richards MD at 04/27/24 1123       Chika Elliott APRN at 04/27/24 0952       Attestation signed by Hakan Lindsey MD at 04/27/24 1953    I have reviewed this documentation and agree.  I have personally seen the patient with nurse practitioner and agree with plan.  I have reviewed labs and imaging.  I also performed an appropriate and complete medical decision making component to the history and physical.  Patient is feeling better overall but had episode of right upper quadrant and left upper quadrant earlier today after bowel movement.  She is eating some.  Her abdomen is soft with mild right  "upper quadrant tenderness.  White count 10 hemoglobin 11 platelets 335 ALT is 61 rest of liver enzymes are normal and creatinine is normal.  Start Bentyl and check KUB.  Continue Zosyn.  She will likely benefit from Skyrizi as an outpatient.  Will follow.                   LOS: 2 days   Patient Care Team:  Thien Reagan MD as PCP - General (Gastroenterology)      Subjective   \" I'm doing okay.  Just a little sore.\"    Interval History:   Labs: Sodium potassium are normal.  Creatinine 0.8.  LFTs are normal except for ALT at 61 (72).  C-reactive protein 13.06 (3.22).  WBCs 10.08, hemoglobin 11.6, platelets 335.  Patient is tolerating clear liquids with some occasional pain with Jell-O.  Patient states her pain comes in waves.  She is still passing gas but no bowel movement yet.  She has never had any bloody stool with this flare.  Patient is up ambulatory in room and sitting in chair.    ROS:   No chest pain, shortness of breath, or cough.         Medication Review:     Current Facility-Administered Medications:     Calcium Replacement - Follow Nurse / BPA Driven Protocol, , Does not apply, PRN, Dana Agudelo DO    heparin (porcine) 5000 UNIT/ML injection 5,000 Units, 5,000 Units, Subcutaneous, Q8H, Carmelo Richards MD, 5,000 Units at 04/27/24 0526    HYDROmorphone (DILAUDID) injection 0.5 mg, 0.5 mg, Intravenous, Q2H PRN, Dana Agudelo DO, 0.5 mg at 04/27/24 0801    lactated ringers infusion, 150 mL/hr, Intravenous, Continuous, Carmelo Richards MD, Last Rate: 150 mL/hr at 04/27/24 0656, 150 mL/hr at 04/27/24 0656    Magnesium Standard Dose Replacement - Follow Nurse / BPA Driven Protocol, , Does not apply, PRN, Dana Agudelo DO    ondansetron ODT (ZOFRAN-ODT) disintegrating tablet 4 mg, 4 mg, Oral, Q6H PRN **OR** ondansetron (ZOFRAN) injection 4 mg, 4 mg, Intravenous, Q6H PRN, Dana Agudelo DO, 4 mg at 04/27/24 0536    Pharmacy to Dose Zosyn, , Does not apply, Continuous PRN, Dana Agudelo, " DO    Phosphorus Replacement - Follow Nurse / BPA Driven Protocol, , Does not apply, PRN, Dana Agudelo,     piperacillin-tazobactam (ZOSYN) 3.375 g IVPB in 100 mL NS MBP (CD), 3.375 g, Intravenous, Q8H, Dana Agudelo, , Last Rate: 0 mL/hr at 04/27/24 0105, 3.375 g at 04/27/24 0525    Potassium Replacement - Follow Nurse / BPA Driven Protocol, , Does not apply, PRN, Dana Agudelo,     prochlorperazine (COMPAZINE) injection 10 mg, 10 mg, Intravenous, Q6H PRN, Dana Agudelo, , 10 mg at 04/27/24 0307    [COMPLETED] Insert Peripheral IV, , , Once **AND** sodium chloride 0.9 % flush 10 mL, 10 mL, Intravenous, PRN, Dana Agudelo,     sodium chloride 0.9 % flush 10 mL, 10 mL, Intravenous, Q12H, Dana Agudelo DO, 10 mL at 04/27/24 0801    sodium chloride 0.9 % flush 10 mL, 10 mL, Intravenous, PRN, Dana Agudelo,     sodium chloride 0.9 % infusion 40 mL, 40 mL, Intravenous, PRN, Dana Agudelo,       Objective comfortable in room.  No family present.  NAD.    Vital Signs  Temp:  [97.5 °F (36.4 °C)-98.4 °F (36.9 °C)] 97.5 °F (36.4 °C)  Heart Rate:  [81-97] 81  Resp:  [15-16] 16  BP: (111-143)/(76-91) 117/81  Physical Exam:    General Appearance:    Awake and alert, in no acute distress   Head:    Normocephalic, without obvious abnormality   Eyes:          Conjunctivae normal, anicteric sclerae   Ears:    Hearing intact   Throat:   No oral lesions, no thrush, oral mucosa moist   Neck:   No adenopathy, supple, no JVD   Lungs:      respirations regular, even and unlabored        Abdomen:      soft, non-tender, no rebound or guarding, non-distended, no hepatosplenomegaly   Rectal:     Deferred   Extremities:   No edema, no cyanosis, no redness   Skin:   No bleeding, bruising or rash, no jaundice   Neurologic:   Cranial nerves 2 - 12 grossly intact, no asterixis, sensation   intact        Results Review:    CBC    Results from last 7 days   Lab Units 04/27/24  0222 04/26/24  0154 04/25/24  0019  "04/24/24  1816   WBC 10*3/mm3 10.08 12.73* 14.09* 13.71*   HEMOGLOBIN g/dL 11.6* 12.5 12.4 13.4   PLATELETS 10*3/mm3 335 370 378 438     CMP   Results from last 7 days   Lab Units 04/27/24  0222 04/26/24  0154 04/25/24  0019 04/24/24  1816   SODIUM mmol/L 138 136 138 136   POTASSIUM mmol/L 4.0 3.9 3.2* 3.4*   CHLORIDE mmol/L 103 99 103 100   CO2 mmol/L 24.0 26.0 24.0 26.0   BUN mg/dL 5* 7 11 13   CREATININE mg/dL 0.80 1.02* 0.73 0.78   GLUCOSE mg/dL 97 81 83 83   ALBUMIN g/dL 3.2* 3.5 3.5 4.0   BILIRUBIN mg/dL 1.1 1.1 0.6 0.3   ALK PHOS U/L 101 87 85 92   AST (SGOT) U/L 21 26 23 24   ALT (SGPT) U/L 61* 72* 69* 77*   MAGNESIUM mg/dL 2.3 2.3  --   --    PHOSPHORUS mg/dL 3.2 3.9  --   --    LIPASE U/L  --   --   --  31     Cr Clearance Estimated Creatinine Clearance: 135.2 mL/min (by C-G formula based on SCr of 0.8 mg/dL).  Coag     HbA1C No results found for: \"HGBA1C\"  Blood Glucose No results found for: \"POCGLU\"  Infection   Results from last 7 days   Lab Units 04/24/24 2127   BLOODCX  No growth at 2 days  No growth at 2 days     UA    Results from last 7 days   Lab Units 04/24/24  1816   NITRITE UA  Negative     Radiology(recent) No radiology results for the last day          Assessment & Plan   28-year-old female with ileocolonic Crohn's disease previously on Humira but lost insurance coverage in January 2024 and off medication presented 4/24/2024 with worsening abdominal pain and CT suggests inflammatory changes at the terminal ileum with microperforation and fistula.     Crohn's exacerbation with inflammation at terminal ileum/microperforation/fistula  Right-sided abdominal pain  Nausea/vomiting  Leukocytosis  Previous Crohn's related abscess  Elevated ALT/hepatic steatosis         Plan:  28-year-old female with history of Crohn's and previously on Humira lost insurance coverage January 2024 was off medication.  Symptoms have been flaring over the past few months.  She has completed prednisone taper as of " yesterday and just 2 weeks ago started Humira biosimilar.  She has history of Crohn's related abscess and was hospitalized in the past. Current CT shows active Crohn's in the terminal ileum with inflammatory mass and microperforation as well as developing complex fistula.     Patient feeling and looking better today.  Up ambulatory in room.  Is passing gas but no bowel movement yet.  Is tolerating clear liquids with some pain.  General surgery is following and hoping to avoid surgery.  Spoke with Dr. Reagan this morning and he recommends Skyrizi.  Spoke to the patient regarding this and she knows a little bit about the medication but she is going to research further.  She is open to the idea.  Will repeat hepatitis B and TB Gold so if insurance approves we can proceed with getting the patient medication.  Continue full liquid diet until seen by general surgery.  Will gradually increase when okay with general surgery.    Will place patient on Bentyl for abdominal pain.  Continue Zosyn.  Continue to encourage ambulation.        GASTON Randhawa  04/27/24  09:52 EDT              Electronically signed by Hakan Lindsey MD at 04/27/24 1953       Chika Elliott APRN at 04/26/24 1346       Attestation signed by Guille Trejo MD at 04/26/24 1402    The patient was seen and examined with an APRN. I personally performed the substantive portion of the history of presenting illness.  I also performed the entire physical exam and medical decision making.    Feeling better today, but still has some pain.  Has been NPO.  On exam she has right upper quadrant abdominal pain  WBC 12, Hgb 12, ALT 72, CRP 3.22  Impression:  Abnormal CT showing active ileal Crohn's disease with possible fistula.  Patient also has a history of abscess.  Flare was brought on by loss of access to Humira by her insurance company and delay in getting her an alternative medication.  Possible microperforation of bowel abdomen is soft  "without peritoneal signs  Plan:  Full liquids today  Appreciate surgery evaluation and recommendations   Continue Zosyn IV  Given the significant disease in the right lower quadrant, surgical resection is a viable option electively.  Humira biosimilar has been held due to active infection and she is 3 days overdue  Consider switching to Stelara or Skyrizi which may have less infection risk as an outpatient    Electronically signed by Guille Trejo MD, 04/26/24, 2:00 PM EDT.                        LOS: 1 day   Patient Care Team:  Thien Reagan MD as PCP - General (Gastroenterology)      Subjective   \" I am good.  Hungry.\"    Interval History:   Labs: Sodium potassium are normal.  Creatinine 1.02.  LFTs normal except for ALT at 72.  WBCs down to 12.73, hemoglobin 12.5, platelets 370.  No bowel movement.  Is passing gas.  Occasional sharp pains in the right upper quadrant.    ROS:   No chest pain, shortness of breath, or cough.         Medication Review:     Current Facility-Administered Medications:     Calcium Replacement - Follow Nurse / BPA Driven Protocol, , Does not apply, PRN, Dana Agudelo DO    heparin (porcine) 5000 UNIT/ML injection 5,000 Units, 5,000 Units, Subcutaneous, Q8H, Carmelo Richards MD    HYDROmorphone (DILAUDID) injection 0.5 mg, 0.5 mg, Intravenous, Q2H PRN, Dana Agudelo DO, 0.5 mg at 04/26/24 0459    lactated ringers infusion, 150 mL/hr, Intravenous, Continuous, Carmelo Richards MD, Last Rate: 150 mL/hr at 04/26/24 1004, 150 mL/hr at 04/26/24 1004    Magnesium Standard Dose Replacement - Follow Nurse / BPA Driven Protocol, , Does not apply, PRN, Dana Agudelo DO    nitroglycerin (NITROSTAT) SL tablet 0.4 mg, 0.4 mg, Sublingual, Q5 Min PRN, Dana Agudelo DO    ondansetron ODT (ZOFRAN-ODT) disintegrating tablet 4 mg, 4 mg, Oral, Q6H PRN **OR** ondansetron (ZOFRAN) injection 4 mg, 4 mg, Intravenous, Q6H PRN, Dana Agudelo DO, 4 mg at 04/26/24 0505    Pharmacy " to Dose Zosyn, , Does not apply, Continuous PRN, Dana Agudelo,     Phosphorus Replacement - Follow Nurse / BPA Driven Protocol, , Does not apply, PRN, Dana Agudelo,     piperacillin-tazobactam (ZOSYN) 3.375 g IVPB in 100 mL NS MBP (CD), 3.375 g, Intravenous, Q8H, Dana Agudelo, , 3.375 g at 04/26/24 0459    Potassium Replacement - Follow Nurse / BPA Driven Protocol, , Does not apply, PRN, Dana Agudelo DO    prochlorperazine (COMPAZINE) injection 10 mg, 10 mg, Intravenous, Q6H PRN, Dana Agudelo, , 10 mg at 04/25/24 2119    [COMPLETED] Insert Peripheral IV, , , Once **AND** sodium chloride 0.9 % flush 10 mL, 10 mL, Intravenous, PRN, Dana Agudelo,     sodium chloride 0.9 % flush 10 mL, 10 mL, Intravenous, Q12H, Dana Agudelo DO, 10 mL at 04/26/24 1005    sodium chloride 0.9 % flush 10 mL, 10 mL, Intravenous, PRN, Dana Agudelo,     sodium chloride 0.9 % infusion 40 mL, 40 mL, Intravenous, PRN, Dana Agudelo DO      Objective resting in bed.  NAD.    Vital Signs  Temp:  [97.7 °F (36.5 °C)-98.8 °F (37.1 °C)] 98.4 °F (36.9 °C)  Heart Rate:  [] 92  Resp:  [16-18] 16  BP: ()/(68-91) 136/91  Physical Exam:    General Appearance:    Awake and alert, in no acute distress   Head:    Normocephalic, without obvious abnormality   Eyes:          Conjunctivae normal, anicteric sclerae   Ears:    Hearing intact   Throat:   No oral lesions, no thrush, oral mucosa moist   Neck:   No adenopathy, supple, no JVD   Lungs:      respirations regular, even and unlabored        Abdomen:      soft, non-tender, no rebound or guarding, non-distended, no hepatosplenomegaly   Rectal:     Deferred   Extremities:   No edema, no cyanosis, no redness   Skin:   No bleeding, bruising or rash, no jaundice   Neurologic:   Cranial nerves 2 - 12 grossly intact, no asterixis, sensation   intact        Results Review:    CBC    Results from last 7 days   Lab Units 04/26/24  0154 04/25/24  0019 04/24/24  181  "  WBC 10*3/mm3 12.73* 14.09* 13.71*   HEMOGLOBIN g/dL 12.5 12.4 13.4   PLATELETS 10*3/mm3 370 378 438     CMP   Results from last 7 days   Lab Units 04/26/24  0154 04/25/24  0019 04/24/24  1816   SODIUM mmol/L 136 138 136   POTASSIUM mmol/L 3.9 3.2* 3.4*   CHLORIDE mmol/L 99 103 100   CO2 mmol/L 26.0 24.0 26.0   BUN mg/dL 7 11 13   CREATININE mg/dL 1.02* 0.73 0.78   GLUCOSE mg/dL 81 83 83   ALBUMIN g/dL 3.5 3.5 4.0   BILIRUBIN mg/dL 1.1 0.6 0.3   ALK PHOS U/L 87 85 92   AST (SGOT) U/L 26 23 24   ALT (SGPT) U/L 72* 69* 77*   MAGNESIUM mg/dL 2.3  --   --    PHOSPHORUS mg/dL 3.9  --   --    LIPASE U/L  --   --  31     Cr Clearance Estimated Creatinine Clearance: 106 mL/min (A) (by C-G formula based on SCr of 1.02 mg/dL (H)).  Coag     HbA1C No results found for: \"HGBA1C\"  Blood Glucose No results found for: \"POCGLU\"  Infection   Results from last 7 days   Lab Units 04/24/24  2127   BLOODCX  No growth at 24 hours  No growth at 24 hours     UA    Results from last 7 days   Lab Units 04/24/24  1816   NITRITE UA  Negative     Radiology(recent) CT Abdomen Pelvis With Contrast    Result Date: 4/24/2024  Impression: 1. CT findings consistent with active Crohn's flare centered at the terminal ileum. There is an inflammatory mass centered in the adjacent right lower quadrant mesentery with ill-defined tracts versus developing complex fistula and at least 1 tiny focus of extraluminal gas (coronal image 60 series 4). No drainable collection/abscess. No associated bowel obstruction. 2. 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 3. Hepatic steatosis. Electronically Signed: Kevin Quintana MD  4/24/2024 8:02 PM EDT  Workstation ID: [id number]           Assessment & Plan   28-year-old female with ileocolonic Crohn's disease previously on Humira but lost insurance coverage in January 2024 and off medication presented 4/24/2024 with worsening abdominal pain and CT suggests inflammatory changes " at the terminal ileum with microperforation and fistula.     Crohn's exacerbation with inflammation at terminal ileum/microperforation/fistula  Right-sided abdominal pain  Nausea/vomiting  Leukocytosis  Previous Crohn's related abscess  Elevated ALT/hepatic steatosis         Plan:  28-year-old female with history of Crohn's and previously on Humira lost insurance coverage 2024 was off medication.  Symptoms have been flaring over the past few months.  She has completed prednisone taper as of yesterday and just 2 weeks ago started Humira biosimilar.  She has history of Crohn's related abscess and was hospitalized in the past. Current CT shows active Crohn's in the terminal ileum with inflammatory mass and microperforation as well as developing complex fistula.     Feeling better and has an appetite today.  Will increase diet to full liquids.  Colorectal surgery consult noted.  Continue to hold Biologics.  Continue Zosyn.        Chika Elliott, GASTON  24  13:46 EDT              Electronically signed by Guille Trejo MD at 24 1402       Carmelo Richards MD at 24 1312              [facility] MEDICINE SERVICE  DAILY PROGRESS NOTE    NAME: Carin Price  : 1995  MRN: [id number]      LOS: 1 day     PROVIDER OF SERVICE: Carmelo Richards MD    Chief Complaint: Crohn disease    Subjective:     Interval History:  History taken from: patient chart RN  Pain controlled no nausea    Review of Systems:   Review of Systems  All negative except as above  Objective:     Vital Signs  Temp:  [97.7 °F (36.5 °C)-98.8 °F (37.1 °C)] 98.4 °F (36.9 °C)  Heart Rate:  [] 92  Resp:  [16-18] 16  BP: ()/(68-91) 136/91   Body mass index is 38.6 kg/m².    Physical Exam  Physical Exam  AOx3 NAD  RRR S1 and S2  Lungs CTA  Abdomen soft nontender nondistended  Scheduled Meds   heparin (porcine), 5,000 Units, Subcutaneous, Q8H  piperacillin-tazobactam, 3.375 g, Intravenous, Q8H  sodium  chloride, 10 mL, Intravenous, Q12H       PRN Meds     Calcium Replacement - Follow Nurse / BPA Driven Protocol    HYDROmorphone    Magnesium Standard Dose Replacement - Follow Nurse / BPA Driven Protocol    nitroglycerin    ondansetron ODT **OR** ondansetron    Pharmacy to Dose Zosyn    Phosphorus Replacement - Follow Nurse / BPA Driven Protocol    Potassium Replacement - Follow Nurse / BPA Driven Protocol    prochlorperazine    [COMPLETED] Insert Peripheral IV **AND** sodium chloride    sodium chloride    sodium chloride   Infusions  lactated ringers, 150 mL/hr, Last Rate: 150 mL/hr (04/26/24 1004)  Pharmacy to Dose Zosyn,           Diagnostic Data    Results from last 7 days   Lab Units 04/26/24  0154   WBC 10*3/mm3 12.73*   HEMOGLOBIN g/dL 12.5   HEMATOCRIT % 39.2   PLATELETS 10*3/mm3 370   GLUCOSE mg/dL 81   CREATININE mg/dL 1.02*   BUN mg/dL 7   SODIUM mmol/L 136   POTASSIUM mmol/L 3.9   AST (SGOT) U/L 26   ALT (SGPT) U/L 72*   ALK PHOS U/L 87   BILIRUBIN mg/dL 1.1   ANION GAP mmol/L 11.0       CT Abdomen Pelvis With Contrast    Result Date: 4/24/2024  Impression: 1. CT findings consistent with active Crohn's flare centered at the terminal ileum. There is an inflammatory mass centered in the adjacent right lower quadrant mesentery with ill-defined tracts versus developing complex fistula and at least 1 tiny focus of extraluminal gas (coronal image 60 series 4). No drainable collection/abscess. No associated bowel obstruction. 2. 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 3. Hepatic steatosis. Electronically Signed: Kevin Quintana MD  4/24/2024 8:02 PM EDT  Workstation ID: [id number]       I reviewed the patient's new clinical results.  I reviewed the patient's new imaging results and agree with the interpretation.    Assessment/Plan:     Active and Resolved Problems  Active Hospital Problems    Diagnosis  POA    **Crohn disease [K50.90]  Yes      Resolved Hospital  Problems   No resolved problems to display.       28-year-old female with history of Crohn's disease previously on Humira admitted to [facility] 4/24 with worsening right lower quadrant abdominal pain     #Acute Crohn's flare  Previously on Humira however due to insurance issues transitioned to Hyrimoz (adalimumab-adaz). Last dose 2 weeks ago  CT abdomen pelvis consistent with acute Crohn's flare centered at terminal ileum.  There is inflammatory mass centered in the adjacent right lower quadrant mesentery with ill-defined tracts versus developing complex fistula  GI and CRS following.  Noted plan to monitor on IV antibiotics  On IV Zosyn  Continue IV fluids  Continue current pain control Zofran as needed  Follow infectious workup  Advance diet per surgery    #Mild JALEEL  Creatinine increased to 1.02  Increase IV fluids  BMP daily    #Electrolyte abnormalities  Replace recheck     #Incidental finding  CT abdomen pelvis showed mixed density 5.5 cm right ovarian lesion containing macroscopic fat, soft tissue density and calcifications compatible with ovarian dermoid     Outpatient follow-up with PCP    DVT prophylaxis:  Medical and mechanical DVT prophylaxis orders are present.         Code status is   Code Status and Medical Interventions:   Ordered at: 04/24/24 2213     Code Status (Patient has no pulse and is not breathing):    CPR (Attempt to Resuscitate)     Medical Interventions (Patient has pulse or is breathing):    Full Support       Plan for disposition: Anticipate discharge in 2 days    Time: 30 minutes    Signature: Electronically signed by Carmelo Richards MD, 04/26/24, 13:12 EDT.  [facility] Hospitalist Team      Electronically signed by Carmelo Richards MD at 04/26/24 0118

## 2024-04-29 NOTE — DISCHARGE SUMMARY
[facility] Medicine Services  Discharge Summary    Date of Service: 24  Patient Name: Carin Price  : 1995  MRN: [id number]    Date of Admission: 2024  Discharge Diagnosis: #Acute Crohn's flare with microperforation and concern for fistula  #Mild JALEEL-resolved  #Electrolyte normalities    Date of Discharge:  24  Primary Care Physician: Thien Reagan MD      Presenting Problem:   Crohn disease [K50.90]  Abdominal pain, unspecified abdominal location [R10.9]  Crohn's disease of ileum with other complication [K50.018]    Active and Resolved Hospital Problems:  Active Hospital Problems    Diagnosis POA    **Crohn disease [K50.90] Yes      Resolved Hospital Problems   No resolved problems to display.         Hospital Course     HPI:  Admitting team HPI   Carin Price is a 28 y.o. female with PMHx of Crohn's disease presented to [facility] for abdominal pain.  Admits to 7/10 sharp abdomonial pain that is worse in RLQ that started more acutely today when she was lifting items at work and developed acute abdominal pain.  States her Crohn's has been flaring off and on for the past several months but this is worse than normal.  Symptoms complicated by nausea and vomiting.  States she had an abscess in  and it presented in a similar fashion.  She called her GI office who recommended she go to [facility] for evalaution.         Hospital Course:  28-year-old female with history of Crohn's disease previously on Humira admitted to [facility]  with worsening right lower quadrant abdominal pain     #Acute Crohn's flare with microperforation and concern for fistula   Previously on Humira however due to insurance issues transitioned to Hyrimoz (adalimumab-adaz). Last dose 2 weeks ago  CT abdomen pelvis consistent with acute Crohn's flare centered at terminal ileum.  There is inflammatory mass centered in the adjacent right lower quadrant mesentery with ill-defined tracts versus developing  complex fistula  Seen by GI and CRS. Appreciate reccs.  Repeat CT abdomen pelvis reviewed    Started on IV Zosyn will transition to p.o. Cipro and Flagyl to finish course  Pain controlled  Culture data nonrevealing thus far  Diet advanced per GI/surgery  Outpatient follow-up with GI and surgery  Started on IV Solu-Medrol transition to p.o. at discharge and 40 mg daily for 2 weeks after that taper per GI     #Mild JALEEL-resolved  Creatinine improved with IV fluids encourage p.o. intake     #Electrolyte abnormalities  Replaced     #Incidental finding  CT abdomen pelvis showed mixed density 5.5 cm right ovarian lesion containing macroscopic fat, soft tissue density and calcifications compatible with ovarian dermoid     Outpatient follow-up with PCP        DISCHARGE Follow Up Recommendations for labs and diagnostics: Outpatient follow-up with GI in colorectal surgery.  Prednisone 40 mg for 2 weeks followed by tapering dose per GI.      Reasons For Change In Medications and Indications for New Medications:      Day of Discharge     Vital Signs:  Temp:  [96.6 °F (35.9 °C)-98 °F (36.7 °C)] 96.6 °F (35.9 °C)  Heart Rate:  [61-79] 61  Resp:  [14-18] 14  BP: (129-133)/(75-81) 129/75    Physical Exam:  Physical Exam AOx3 NAD  RRR S1-S2 audible  Lungs CTA  Abdomen soft nontender nondistended      Pertinent  and/or Most Recent Results     LAB RESULTS:      Lab 04/28/24  0759 04/28/24  0614 04/27/24  0222 04/26/24  0154 04/25/24  0019 04/24/24  2131 04/24/24  1816   WBC 9.79  --  10.08 12.73* 14.09*  --  13.71*   HEMOGLOBIN 12.5  --  11.6* 12.5 12.4  --  13.4   HEMATOCRIT 38.3  --  35.4 39.2 37.8  --  41.8   PLATELETS 385  --  335 370 378  --  438   NEUTROS ABS  --   --   --  8.20*  --   --  8.61*   IMMATURE GRANS (ABS)  --   --   --  0.03  --   --  0.04   LYMPHS ABS  --   --   --  3.11*  --   --  3.71*   MONOS ABS  --   --   --  1.19*  --   --  1.22*   EOS ABS  --   --   --  0.17  --   --  0.09   MCV 86.3  --  86.3 86.9 85.9  --   87.1   SED RATE  --   --   --   --  28*  --   --    CRP  --  11.27* 13.06*  --  3.22*  --   --    LACTATE  --   --   --   --   --  0.9  --          Lab 04/29/24  0408 04/28/24  0614 04/27/24 0222 04/26/24  0154 04/25/24  0019   SODIUM 138 137 138 136 138   POTASSIUM 4.5 4.1 4.0 3.9 3.2*   CHLORIDE 103 104 103 99 103   CO2 22.0 25.0 24.0 26.0 24.0   ANION GAP 13.0 8.0 11.0 11.0 11.0   BUN 2* 3* 5* 7 11   CREATININE 0.70 0.74 0.80 1.02* 0.73   EGFR 121.0 113.2 103.1 77.0 115.0   GLUCOSE 111* 95 97 81 83   CALCIUM 9.9 8.9 8.7 9.0 8.8   MAGNESIUM 2.7* 2.6 2.3 2.3  --    PHOSPHORUS 4.3 3.7 3.2 3.9  --          Lab 04/29/24  0408 04/28/24  0614 04/27/24 0222 04/26/24  0154 04/25/24  0019 04/24/24 1816   TOTAL PROTEIN 7.7 6.2 6.4 6.8 6.7 7.7   ALBUMIN 3.9 3.2* 3.2* 3.5 3.5 4.0   GLOBULIN 3.8 3.0 3.2 3.3 3.2 3.7   ALT (SGPT) 46* 44* 61* 72* 69* 77*   AST (SGOT) 16 14 21 26 23 24   BILIRUBIN 0.5 0.5 1.1 1.1 0.6 0.3   ALK PHOS 94 81 101 87 85 92   LIPASE  --   --   --   --   --  31                     Brief Urine Lab Results  (Last result in the past 365 days)        Color   Clarity   Blood   Leuk Est   Nitrite   Protein   CREAT   Urine HCG        04/24/24 1816               Negative       04/24/24 1816 Yellow   Clear   Negative   Negative   Negative   Trace                 Microbiology Results (last 10 days)       Procedure Component Value - Date/Time    Blood Culture - Blood, Arm, Left [[id number]]  (Normal) Collected: 04/24/24 2127    Lab Status: Preliminary result Specimen: Blood from Arm, Left Updated: 04/28/24 2131     Blood Culture No growth at 4 days    Blood Culture - Blood, Arm, Right [[id number]]  (Normal) Collected: 04/24/24 2127    Lab Status: Preliminary result Specimen: Blood from Arm, Right Updated: 04/28/24 2131     Blood Culture No growth at 4 days            CT Abdomen Pelvis With Contrast    Result Date: 4/28/2024  Impression: Impression: Increased inflammatory change about the terminal ileum in keeping  with Crohn's related terminal ileitis. Redemonstrated masslike phlegmon adjacent to the terminal ileum with suggestion of developing peripheral rim enhancement which could represent small early abscess formation. This only measures up to 1.6 cm and would not be amenable to drainage at this time. No evidence of perforation or new extraluminal gas. Hepatic steatosis. Unchanged right ovarian mature cystic teratoma. Electronically Signed: Saulo Brandt MD  4/28/2024 11:43 AM EDT  Workstation ID: [id number]    XR Abdomen KUB    Result Date: 4/28/2024  Impression: Impression: No acute abnormality Electronically Signed: Jeff Herrera MD  4/28/2024 4:56 AM EDT  Workstation ID: [id number]    CT Abdomen Pelvis With Contrast    Result Date: 4/24/2024  Impression: Impression: 1. CT findings consistent with active Crohn's flare centered at the terminal ileum. There is an inflammatory mass centered in the adjacent right lower quadrant mesentery with ill-defined tracts versus developing complex fistula and at least 1 tiny focus of extraluminal gas (coronal image 60 series 4). No drainable collection/abscess. No associated bowel obstruction. 2. 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 3. Hepatic steatosis. Electronically Signed: Kevin Quintana MD  4/24/2024 8:02 PM EDT  Workstation ID: [id number]                 Labs Pending at Discharge:  Pending Labs       Order Current Status    QuantiFERON-TB Gold Plus In process    Blood Culture - Blood, Arm, Left Preliminary result    Blood Culture - Blood, Arm, Right Preliminary result            Procedures Performed           Consults:   Consults       Date and Time Order Name Status Description    4/24/2024 10:13 PM Inpatient Colorectal Surgery Consult Completed     4/24/2024  8:58 PM Hospitalist (on-call MD unless specified)      4/24/2024  8:09 PM Gastroenterology (on-call MD unless specified) Completed               Discharge Details         Discharge Medications        New Medications        Instructions Start Date   ciprofloxacin 500 MG tablet  Commonly known as: Cipro   500 mg, Oral, 2 Times Daily      dicyclomine 10 MG capsule  Commonly known as: BENTYL   20 mg, Oral, 3 Times Daily      HYDROcodone-acetaminophen 5-325 MG per tablet  Commonly known as: NORCO   1 tablet, Oral, Every 8 Hours PRN      metroNIDAZOLE 500 MG tablet  Commonly known as: Flagyl   500 mg, Oral, 3 Times Daily             Changes to Medications        Instructions Start Date   predniSONE 20 MG tablet  Commonly known as: DELTASONE  What changed:   how much to take  when to take this   40 mg, Oral, Daily With Breakfast   Start Date: April 30, 2024            Continue These Medications        Instructions Start Date   HYRIMOZ SC   Subcutaneous, Every 14 Days               No Known Allergies      Discharge Disposition: Home  Home or Self Care    Diet:  Hospital:  Diet Order   Procedures    Diet: Gastrointestinal; Fiber-Restricted; Fluid Consistency: Thin (IDDSI 0)         Discharge Activity:         CODE STATUS:  Code Status and Medical Interventions:   Ordered at: 04/24/24 2213     Code Status (Patient has no pulse and is not breathing):    CPR (Attempt to Resuscitate)     Medical Interventions (Patient has pulse or is breathing):    Full Support         Future Appointments   Date Time Provider Department Center   5/8/2024  2:30 PM Sachin Murphy MD [facility]           Time spent on Discharge including face to face service:  >30 minutes    Signature: Electronically signed by Carmelo Richards MD, 04/29/24, 12:18 EDT.  [facility] Hospitalist Team

## 2024-04-29 NOTE — PROGRESS NOTES
Colorectal Surgery Progress Note    Pt. Name/Age/:  Carin Price   28 y.o.    1995         Med. Record Number:   [id number]  Date of admission:  2024      Service(s): Colorectal Surgery      Subjective    Doing better   Tolerating diet   No nausea  Reports abdominal pain better   Afebrile and hemodynamically stable     Objective  Vitals:     Patient Vitals for the past 24 hrs:   BP Temp Temp src Pulse Resp SpO2   24 0445 129/75 96.6 °F (35.9 °C) Axillary 61 14 98 %   24 133/81 98 °F (36.7 °C) Oral 79 18 94 %   24 1129 107/70 97.6 °F (36.4 °C) Oral 83 16 96 %           Wt. Admission: Weight: 113 kg (250 lb)     Wt. Current: Weight: 114 kg (251 lb 5.2 oz) (via bed scale)   Body mass index is 39.36 kg/m².      I&O:  Intake/Output Summary (Last 24 hours) at 2024 0926  Last data filed at 2024  Gross per 24 hour   Intake 1020 ml   Output --   Net 1020 ml      1901 -  0700  In: 1260 [P.O.:1260]  Out: -       Exam  General:  No acute distress  Head: Normocephalic, atraumatic  Neuro: Alert and oriented     Abdomen:  Soft, mild tender on right upper quadrant, non-distended, no masses, no hernias, no guarding, no diffuse peritoneal sign.      Labs:     [unfilled]          Scheduled Meds:dicyclomine, 20 mg, Oral, TID  heparin (porcine), 5,000 Units, Subcutaneous, Q8H  methylPREDNISolone sodium succinate, 20 mg, Intravenous, Q12H  piperacillin-tazobactam, 3.375 g, Intravenous, Q8H  sodium chloride, 10 mL, Intravenous, Q12H      Continuous Infusions:Pharmacy to Dose Zosyn,       PRN Meds:.  Calcium Replacement - Follow Nurse / BPA Driven Protocol    HYDROcodone-acetaminophen    Magnesium Standard Dose Replacement - Follow Nurse / BPA Driven Protocol    ondansetron ODT **OR** ondansetron    Pharmacy to Dose Zosyn    Phosphorus Replacement - Follow Nurse / BPA Driven Protocol    Potassium Replacement - Follow Nurse / BPA Driven Protocol    prochlorperazine     [COMPLETED] Insert Peripheral IV **AND** sodium chloride    sodium chloride    sodium chloride          Assessment  28 y.o. female with chron's disease with acute on chronic terminal ileitis     Plan / Recommendations    - showing improvements clinically      - clear from surgical stand point for discharge. I will follow with her as outpatient.     - appreciate GI input. Management of immunosuppression per GI team.         09:26 EDT; 4/29/2024        Sachin Murphy MD  Colon and Rectal Surgery   [facility]

## 2024-04-29 NOTE — PROGRESS NOTES
LOS: 4 days   Patient Care Team:  Thien Reagan MD as PCP - General (Gastroenterology)      Subjective     Interval History:     Subjective: Patient reports that she is feeling better today.  She reports having bowel movements without bright red blood per rectum or melena.  Also passing gas.  Tolerating clear liquids without nausea/vomiting.  Abdominal pain has significantly improved.      ROS:   No chest pain, shortness of breath, or cough.        Medication Review:     Current Facility-Administered Medications:     Calcium Replacement - Follow Nurse / BPA Driven Protocol, , Does not apply, PRN, Dana Agudelo DO    dicyclomine (BENTYL) capsule 20 mg, 20 mg, Oral, TID, Chika Elliott APRN, 20 mg at 04/29/24 0839    heparin (porcine) 5000 UNIT/ML injection 5,000 Units, 5,000 Units, Subcutaneous, Q8H, Carmelo Richards MD, 5,000 Units at 04/29/24 0529    HYDROcodone-acetaminophen (NORCO) 5-325 MG per tablet 1 tablet, 1 tablet, Oral, Q6H PRN, Chika Elliott APRN, 1 tablet at 04/28/24 1032    Magnesium Standard Dose Replacement - Follow Nurse / BPA Driven Protocol, , Does not apply, PRN, Dana Agudelo DO    methylPREDNISolone sodium succinate (SOLU-Medrol) injection 20 mg, 20 mg, Intravenous, Q12H, Chika Elliott APRN, 20 mg at 04/29/24 0529    ondansetron ODT (ZOFRAN-ODT) disintegrating tablet 4 mg, 4 mg, Oral, Q6H PRN, 4 mg at 04/29/24 0529 **OR** ondansetron (ZOFRAN) injection 4 mg, 4 mg, Intravenous, Q6H PRN, Dana Agudelo DO, 4 mg at 04/28/24 2220    Pharmacy to Dose Zosyn, , Does not apply, Continuous PRN, Dana Agudelo DO    Phosphorus Replacement - Follow Nurse / BPA Driven Protocol, , Does not apply, PRN, Dana Agudelo DO    piperacillin-tazobactam (ZOSYN) 3.375 g IVPB in 100 mL NS MBP (CD), 3.375 g, Intravenous, Q8H, Dana Agudelo DO, Last Rate: 0 mL/hr at 04/27/24 0105, 3.375 g at 04/29/24 0529    Potassium Replacement - Follow Nurse / BPA Driven Protocol, , Does not apply, PRN,  Dana Agudelo DO    prochlorperazine (COMPAZINE) injection 10 mg, 10 mg, Intravenous, Q6H PRN, Dana Agudelo DO, 10 mg at 04/28/24 1052    [COMPLETED] Insert Peripheral IV, , , Once **AND** sodium chloride 0.9 % flush 10 mL, 10 mL, Intravenous, PRN, Dana Agudelo DO    sodium chloride 0.9 % flush 10 mL, 10 mL, Intravenous, Q12H, Dana Agudelo DO, 10 mL at 04/29/24 0839    sodium chloride 0.9 % flush 10 mL, 10 mL, Intravenous, PRN, Dana Agudelo DO    sodium chloride 0.9 % infusion 40 mL, 40 mL, Intravenous, PRN, Dana Agudelo DO      Objective     Vital Signs  Vitals:    04/28/24 0600 04/28/24 1129 04/28/24 2009 04/29/24 0445   BP:  107/70 133/81 129/75   BP Location:  Right arm Left arm Left arm   Patient Position:  Lying Lying Sitting   Pulse:  83 79 61   Resp:  16 18 14   Temp:  97.6 °F (36.4 °C) 98 °F (36.7 °C) 96.6 °F (35.9 °C)   TempSrc:  Oral Oral Axillary   SpO2:  96% 94% 98%   Weight: 114 kg (251 lb 5.2 oz)      Height:           Physical Exam:     General Appearance:    Awake and alert, in no acute distress   Head:    Normocephalic, without obvious abnormality   Eyes:          Conjunctivae normal, anicteric sclera   Throat:   No oral lesions, no thrush, oral mucosa moist   Neck:   No adenopathy, supple, no JVD   Lungs:     respirations regular, even and unlabored   Abdomen:     Soft, mild lower abdominal tenderness, no rebound or guarding, non-distended   Rectal:     Deferred   Extremities:   No edema, no cyanosis   Skin:   No bruising or rash, no jaundice        Results Review:    CBC    Results from last 7 days   Lab Units 04/28/24  0759 04/27/24  0222 04/26/24  0154 04/25/24  0019 04/24/24  1816   WBC 10*3/mm3 9.79 10.08 12.73* 14.09* 13.71*   HEMOGLOBIN g/dL 12.5 11.6* 12.5 12.4 13.4   PLATELETS 10*3/mm3 385 335 370 378 438     CMP   Results from last 7 days   Lab Units 04/29/24  0408 04/28/24  0614 04/27/24  0222 04/26/24  0154 04/25/24  0019 04/24/24  1816   SODIUM mmol/L 138 137  "138 136 138 136   POTASSIUM mmol/L 4.5 4.1 4.0 3.9 3.2* 3.4*   CHLORIDE mmol/L 103 104 103 99 103 100   CO2 mmol/L 22.0 25.0 24.0 26.0 24.0 26.0   BUN mg/dL 2* 3* 5* 7 11 13   CREATININE mg/dL 0.70 0.74 0.80 1.02* 0.73 0.78   GLUCOSE mg/dL 111* 95 97 81 83 83   ALBUMIN g/dL 3.9 3.2* 3.2* 3.5 3.5 4.0   BILIRUBIN mg/dL 0.5 0.5 1.1 1.1 0.6 0.3   ALK PHOS U/L 94 81 101 87 85 92   AST (SGOT) U/L 16 14 21 26 23 24   ALT (SGPT) U/L 46* 44* 61* 72* 69* 77*   MAGNESIUM mg/dL 2.7* 2.6 2.3 2.3  --   --    PHOSPHORUS mg/dL 4.3 3.7 3.2 3.9  --   --    LIPASE U/L  --   --   --   --   --  31     Cr Clearance Estimated Creatinine Clearance: 156 mL/min (by C-G formula based on SCr of 0.7 mg/dL).  Coag     HbA1C No results found for: \"HGBA1C\"      Infection   Results from last 7 days   Lab Units 04/24/24 2127   BLOODCX  No growth at 4 days  No growth at 4 days     UA    Results from last 7 days   Lab Units 04/24/24  1816   NITRITE UA  Negative     Microbiology Results (last 10 days)       Procedure Component Value - Date/Time    Blood Culture - Blood, Arm, Left [[id number]]  (Normal) Collected: 04/24/24 2127    Lab Status: Preliminary result Specimen: Blood from Arm, Left Updated: 04/28/24 2131     Blood Culture No growth at 4 days    Blood Culture - Blood, Arm, Right [[id number]]  (Normal) Collected: 04/24/24 2127    Lab Status: Preliminary result Specimen: Blood from Arm, Right Updated: 04/28/24 2131     Blood Culture No growth at 4 days          Imaging Results (Last 72 Hours)       Procedure Component Value Units Date/Time    CT Abdomen Pelvis With Contrast [[id number]] Collected: 04/28/24 1131     Updated: 04/28/24 1145    Narrative:      CT ABDOMEN PELVIS W CONTRAST    Date of Exam: 4/28/2024 9:21 AM EDT    Indication: Abdominal pain, acute, nonlocalized.    Comparison: Same day radiographs and 4/24/2024 CT    Technique: Axial CT images were obtained of the abdomen and pelvis following the uneventful intravenous administration " of iodinated contrast. Sagittal and coronal reconstructions were performed.  Automated exposure control and iterative reconstruction   methods were used.        Findings:  Included lung bases show no acute abnormality.    Hepatic steatosis. Cholecystectomy. No significant biliary ductal dilation.    The spleen, pancreas, and bilateral adrenal glands are unremarkable.    There is no hydronephrosis or nephrolithiasis. No suspicious renal lesion.    No evidence of bowel obstruction. As compared to 4/24/2024 CT, there is slightly increased inflammatory changes regarding the terminal ileum including increased small volume free fluid and adjacent peritoneal thickening. There is persistent prominent   lymphadenopathy at the site. The previously noted adjacent inflammatory mass/phlegmon now demonstrates suggestion of peripheral rim enhancement and central hypoenhancement as seen on axial image 105 where it measures up to 1.6 cm. There is no new   extraluminal gas. The previous focus of extraluminal gas has resolved.    No abdominal aortic aneurysm. The portal vein is patent.    Urinary bladder is unremarkable. Uterus is unchanged. Unchanged appearance of the right ovarian mature cystic teratoma.    Abdominal and pelvic wall soft tissues show no acute abnormality. There is no fracture or suspicious osseous lesion. Bilateral L5 pars defects noted..      Impression:      Impression:  Increased inflammatory change about the terminal ileum in keeping with Crohn's related terminal ileitis. Redemonstrated masslike phlegmon adjacent to the terminal ileum with suggestion of developing peripheral rim enhancement which could represent small   early abscess formation. This only measures up to 1.6 cm and would not be amenable to drainage at this time. No evidence of perforation or new extraluminal gas.    Hepatic steatosis.    Unchanged right ovarian mature cystic teratoma.            Electronically Signed: Saulo Brandt MD    4/28/2024  11:43 AM EDT    Workstation ID: [id number]    XR Abdomen KUB [[id number]] Collected: 04/28/24 0455     Updated: 04/28/24 0458    Narrative:      XR ABDOMEN KUB    Date of Exam: 4/27/2024 7:53 PM EDT    Indication: abdominal pain    Comparison: None available.    Findings:  No gross free air or pneumatosis though evaluation is slightly limited due to technique. There is a non obstructive bowel gas pattern. A moderate stool burden is present.  No suspicious calcifications identified. No acute osseous abnormality identified.      Impression:      Impression:  No acute abnormality      Electronically Signed: Jeff Herrera MD    4/28/2024 4:56 AM EDT    Workstation ID: [id number]            Assessment & Plan     ASSESSMENT:  -Crohn's exacerbation with inflammation at the TI/microperforation/fistula  -Right-sided abdominal pain  -Nausea/vomiting  -Leukocytosis  -History of previous Crohn's related abscess  -Elevated ALT/hepatic steatosis    PLAN:  Patient is a 28-year-old female with history of ileocolonic Crohn's disease previously on Humira (but lost insurance coverage in 1/2024 and was off medication) who presented on 4/24 with complaints of abdominal pain.  CT abdomen/pelvis shows active Crohn's disease in the TI with inflammatory mass and microperforation as well as developing complex fistula.    The patient reports that she is feeling better today.  Abdominal pain has significantly improved.  No overt GI bleeding.  No nausea/vomiting.  Case was discussed with Dr. Murphy.  We will continue steroids with plans to start Skyrizi as outpatient.  Task has been sent to our office to initiate insurance approval process.  We will switch to oral steroids.  Continue Bentyl.  Advance diet as tolerated.  Continue antibiotics.  Okay for discharge home today from GI standpoint.  Keep follow-up appointment with Rekha Jernigan NP on 5/2/2024 at 2:15 PM as scheduled.  GI will be available as inpatient as needed.        Electronically signed  by GASTON Potter, 04/29/24, 10:23 AM EDT.

## 2024-04-29 NOTE — PLAN OF CARE
Goal Outcome Evaluation:   Pt resting abed with no c/o abd pain or discomfort at this time. Pt able to make needs known, call light in reach.

## 2024-04-29 NOTE — CASE MANAGEMENT/SOCIAL WORK
Continued Stay Note   [facility]     Patient Name: Carin Price  MRN: [id number]  Today's Date: 4/29/2024    Admit Date: 4/24/2024    Plan: Plan to dc home with spouse.   Discharge Plan       Row Name 04/29/24 1537       Plan    Plan Plan to dc home with spouse.    Plan Comments Plan to dc home today.    Final Discharge Disposition Code 01 - home or self-care    Final Note Home               Expected Discharge Date and Time       Expected Discharge Date Expected Discharge Time    Apr 29, 2024               JERSEY Hugo RN  SIPS/ICU   O: [phone]  C: [phone]  [email or web address]

## 2024-04-30 ENCOUNTER — READMISSION MANAGEMENT (OUTPATIENT)
Dept: CALL CENTER | Facility: HOSPITAL | Age: 29
End: 2024-04-30
Payer: COMMERCIAL

## 2024-04-30 LAB
GAMMA INTERFERON BACKGROUND BLD IA-ACNC: 0.02 IU/ML
M TB IFN-G BLD-IMP: NEGATIVE
M TB IFN-G CD4+ BCKGRND COR BLD-ACNC: 0.02 IU/ML
M TB IFN-G CD4+CD8+ BCKGRND COR BLD-ACNC: 0.02 IU/ML
MITOGEN IGNF BCKGRD COR BLD-ACNC: >10 IU/ML
QUANTIFERON INCUBATION: NORMAL
SERVICE CMNT-IMP: NORMAL

## 2024-04-30 NOTE — OUTREACH NOTE
Prep Survey      Flowsheet Row Responses   [facility] patient discharged from? [facility]   Is LACE score < 7 ? No   Eligibility Readm Mgmt   Discharge diagnosis Crohn disease   Does the patient have one of the following disease processes/diagnoses(primary or secondary)? Other   Does the patient have Home health ordered? No   Is there a DME ordered? No   Medication alerts for this patient see avs--po antibx   Prep survey completed? Yes            Julieta MONZON - Registered Nurse

## 2024-05-02 ENCOUNTER — OFFICE (OUTPATIENT)
Dept: URBAN - METROPOLITAN AREA CLINIC 64 | Facility: CLINIC | Age: 29
End: 2024-05-02
Payer: COMMERCIAL

## 2024-05-02 ENCOUNTER — READMISSION MANAGEMENT (OUTPATIENT)
Dept: CALL CENTER | Facility: HOSPITAL | Age: 29
End: 2024-05-02
Payer: COMMERCIAL

## 2024-05-02 VITALS
HEIGHT: 67 IN | WEIGHT: 249 LBS | SYSTOLIC BLOOD PRESSURE: 140 MMHG | DIASTOLIC BLOOD PRESSURE: 87 MMHG | HEART RATE: 61 BPM

## 2024-05-02 DIAGNOSIS — K50.013 CROHN'S DISEASE OF SMALL INTESTINE WITH FISTULA: ICD-10-CM

## 2024-05-02 DIAGNOSIS — R11.0 NAUSEA: ICD-10-CM

## 2024-05-02 DIAGNOSIS — K21.9 GASTRO-ESOPHAGEAL REFLUX DISEASE WITHOUT ESOPHAGITIS: ICD-10-CM

## 2024-05-02 PROCEDURE — 99214 OFFICE O/P EST MOD 30 MIN: CPT | Performed by: NURSE PRACTITIONER

## 2024-05-02 RX ORDER — PANTOPRAZOLE SODIUM 40 MG/1
40 TABLET, DELAYED RELEASE ORAL
Qty: 30 | Refills: 11 | Status: ACTIVE
Start: 2024-05-02

## 2024-05-02 NOTE — OUTREACH NOTE
Medical Week 1 Survey      Flowsheet Row Responses   [facility] patient discharged from? [facility]   Does the patient have one of the following disease processes/diagnoses(primary or secondary)? Other   Week 1 attempt successful? Yes   Call start time 0922   Call end time 0924   Discharge diagnosis Crohn disease   Is patient permission given to speak with other caregiver? Yes   List who call center can speak with Maguifiliberto eisenberg   Person spoke with today (if not patient) and relationship Maguifiliberto eisenberg   Meds reviewed with patient/caregiver? Yes   Is the patient having any side effects they believe may be caused by any medication additions or changes? No   Does the patient have all medications ordered at discharge? Yes   Is the patient taking all medications as directed (includes completed medication regime)? Yes   Does the patient have a primary care provider?  Yes   Has the patient kept scheduled appointments due by today? Yes  [GI apt 5/2/24]   Comments New pt colorectal surgery 5/8/24   Has home health visited the patient within 72 hours of discharge? N/A   Did the patient receive a copy of their discharge instructions? Yes   Nursing interventions Reviewed instructions with patient   What is the patient's perception of their health status since discharge? Improving   Is the patient/caregiver able to teach back signs and symptoms related to disease process for when to call PCP? Yes   Is the patient/caregiver able to teach back signs and symptoms related to disease process for when to call 911? Yes   Is the patient/caregiver able to teach back the hierarchy of who to call/visit for symptoms/problems? PCP, Specialist, Home health nurse, Urgent Care, ED, 911 Yes   If the patient is a current smoker, are they able to teach back resources for cessation? Not a smoker   Week 1 call completed? Yes   Wrap up additional comments Magui eisenberg states pt is improving   Call end time 0924            Roxy H - Registered  Nurse

## 2024-05-06 ENCOUNTER — APPOINTMENT (OUTPATIENT)
Dept: CT IMAGING | Facility: HOSPITAL | Age: 29
End: 2024-05-06
Payer: COMMERCIAL

## 2024-05-06 ENCOUNTER — HOSPITAL ENCOUNTER (EMERGENCY)
Facility: HOSPITAL | Age: 29
Discharge: HOME | End: 2024-05-06
Admitting: EMERGENCY MEDICINE
Payer: COMMERCIAL

## 2024-05-06 VITALS
SYSTOLIC BLOOD PRESSURE: 135 MMHG | BODY MASS INDEX: 39.27 KG/M2 | OXYGEN SATURATION: 96 % | TEMPERATURE: 98 F | DIASTOLIC BLOOD PRESSURE: 94 MMHG | RESPIRATION RATE: 18 BRPM | HEIGHT: 67 IN | HEART RATE: 73 BPM | WEIGHT: 250.22 LBS

## 2024-05-06 DIAGNOSIS — K50.019 TERMINAL ILEITIS WITH COMPLICATION: ICD-10-CM

## 2024-05-06 DIAGNOSIS — R10.9 RIGHT SIDED ABDOMINAL PAIN: Primary | ICD-10-CM

## 2024-05-06 DIAGNOSIS — K50.90 CROHN'S DISEASE WITHOUT COMPLICATION, UNSPECIFIED GASTROINTESTINAL TRACT LOCATION: ICD-10-CM

## 2024-05-06 LAB
ALBUMIN SERPL-MCNC: 4.5 G/DL (ref 3.5–5.2)
ALBUMIN/GLOB SERPL: 1.3 G/DL
ALP SERPL-CCNC: 72 U/L (ref 39–117)
ALT SERPL W P-5'-P-CCNC: 29 U/L (ref 1–33)
ANION GAP SERPL CALCULATED.3IONS-SCNC: 14 MMOL/L (ref 5–15)
AST SERPL-CCNC: 14 U/L (ref 1–32)
B-HCG UR QL: NEGATIVE
BASOPHILS # BLD AUTO: 0.02 10*3/MM3 (ref 0–0.2)
BASOPHILS NFR BLD AUTO: 0.1 % (ref 0–1.5)
BILIRUB SERPL-MCNC: 0.2 MG/DL (ref 0–1.2)
BILIRUB UR QL STRIP: NEGATIVE
BUN SERPL-MCNC: 15 MG/DL (ref 6–20)
BUN/CREAT SERPL: 22.1 (ref 7–25)
CALCIUM SPEC-SCNC: 9.8 MG/DL (ref 8.6–10.5)
CHLORIDE SERPL-SCNC: 97 MMOL/L (ref 98–107)
CLARITY UR: CLEAR
CO2 SERPL-SCNC: 23 MMOL/L (ref 22–29)
COLOR UR: YELLOW
CREAT SERPL-MCNC: 0.68 MG/DL (ref 0.57–1)
DEPRECATED RDW RBC AUTO: 42.8 FL (ref 37–54)
EGFRCR SERPLBLD CKD-EPI 2021: 121.8 ML/MIN/1.73
EOSINOPHIL # BLD AUTO: 0 10*3/MM3 (ref 0–0.4)
EOSINOPHIL NFR BLD AUTO: 0 % (ref 0.3–6.2)
ERYTHROCYTE [DISTWIDTH] IN BLOOD BY AUTOMATED COUNT: 13.7 % (ref 12.3–15.4)
GLOBULIN UR ELPH-MCNC: 3.6 GM/DL
GLUCOSE SERPL-MCNC: 100 MG/DL (ref 65–99)
GLUCOSE UR STRIP-MCNC: NEGATIVE MG/DL
HCT VFR BLD AUTO: 43 % (ref 34–46.6)
HGB BLD-MCNC: 14.1 G/DL (ref 12–15.9)
HGB UR QL STRIP.AUTO: NEGATIVE
HOLD SPECIMEN: NORMAL
IMM GRANULOCYTES # BLD AUTO: 0.11 10*3/MM3 (ref 0–0.05)
IMM GRANULOCYTES NFR BLD AUTO: 0.7 % (ref 0–0.5)
KETONES UR QL STRIP: NEGATIVE
LEUKOCYTE ESTERASE UR QL STRIP.AUTO: NEGATIVE
LYMPHOCYTES # BLD AUTO: 2.37 10*3/MM3 (ref 0.7–3.1)
LYMPHOCYTES NFR BLD AUTO: 15.5 % (ref 19.6–45.3)
MCH RBC QN AUTO: 28.4 PG (ref 26.6–33)
MCHC RBC AUTO-ENTMCNC: 32.8 G/DL (ref 31.5–35.7)
MCV RBC AUTO: 86.7 FL (ref 79–97)
MONOCYTES # BLD AUTO: 0.45 10*3/MM3 (ref 0.1–0.9)
MONOCYTES NFR BLD AUTO: 2.9 % (ref 5–12)
NEUTROPHILS NFR BLD AUTO: 12.34 10*3/MM3 (ref 1.7–7)
NEUTROPHILS NFR BLD AUTO: 80.8 % (ref 42.7–76)
NITRITE UR QL STRIP: NEGATIVE
NRBC BLD AUTO-RTO: 0 /100 WBC (ref 0–0.2)
PH UR STRIP.AUTO: 6 [PH] (ref 5–8)
PLATELET # BLD AUTO: 617 10*3/MM3 (ref 140–450)
PMV BLD AUTO: 8.8 FL (ref 6–12)
POTASSIUM SERPL-SCNC: 4 MMOL/L (ref 3.5–5.2)
PROT SERPL-MCNC: 8.1 G/DL (ref 6–8.5)
PROT UR QL STRIP: NEGATIVE
RBC # BLD AUTO: 4.96 10*6/MM3 (ref 3.77–5.28)
SODIUM SERPL-SCNC: 134 MMOL/L (ref 136–145)
SP GR UR STRIP: 1.01 (ref 1–1.03)
UROBILINOGEN UR QL STRIP: NORMAL
WBC NRBC COR # BLD AUTO: 15.29 10*3/MM3 (ref 3.4–10.8)
WHOLE BLOOD HOLD COAG: NORMAL

## 2024-05-06 PROCEDURE — 96375 TX/PRO/DX INJ NEW DRUG ADDON: CPT

## 2024-05-06 PROCEDURE — 81025 URINE PREGNANCY TEST: CPT | Performed by: PHYSICIAN ASSISTANT

## 2024-05-06 PROCEDURE — 25010000002 HYDROMORPHONE 1 MG/ML SOLUTION: Performed by: PHYSICIAN ASSISTANT

## 2024-05-06 PROCEDURE — 80053 COMPREHEN METABOLIC PANEL: CPT | Performed by: PHYSICIAN ASSISTANT

## 2024-05-06 PROCEDURE — 96374 THER/PROPH/DIAG INJ IV PUSH: CPT

## 2024-05-06 PROCEDURE — 96376 TX/PRO/DX INJ SAME DRUG ADON: CPT

## 2024-05-06 PROCEDURE — 25010000002 METOCLOPRAMIDE PER 10 MG: Performed by: PHYSICIAN ASSISTANT

## 2024-05-06 PROCEDURE — 99285 EMERGENCY DEPT VISIT HI MDM: CPT

## 2024-05-06 PROCEDURE — 85025 COMPLETE CBC W/AUTO DIFF WBC: CPT | Performed by: PHYSICIAN ASSISTANT

## 2024-05-06 PROCEDURE — 81003 URINALYSIS AUTO W/O SCOPE: CPT | Performed by: PHYSICIAN ASSISTANT

## 2024-05-06 PROCEDURE — 74177 CT ABD & PELVIS W/CONTRAST: CPT

## 2024-05-06 PROCEDURE — 25010000002 ONDANSETRON PER 1 MG: Performed by: PHYSICIAN ASSISTANT

## 2024-05-06 PROCEDURE — 25810000003 SODIUM CHLORIDE 0.9 % SOLUTION: Performed by: PHYSICIAN ASSISTANT

## 2024-05-06 PROCEDURE — 25510000001 IOPAMIDOL PER 1 ML: Performed by: PHYSICIAN ASSISTANT

## 2024-05-06 RX ORDER — ONDANSETRON 2 MG/ML
4 INJECTION INTRAMUSCULAR; INTRAVENOUS ONCE
Status: COMPLETED | OUTPATIENT
Start: 2024-05-06 | End: 2024-05-06

## 2024-05-06 RX ORDER — METOCLOPRAMIDE HYDROCHLORIDE 5 MG/ML
10 INJECTION INTRAMUSCULAR; INTRAVENOUS ONCE
Status: COMPLETED | OUTPATIENT
Start: 2024-05-06 | End: 2024-05-06

## 2024-05-06 RX ORDER — SODIUM CHLORIDE 0.9 % (FLUSH) 0.9 %
10 SYRINGE (ML) INJECTION AS NEEDED
Status: DISCONTINUED | OUTPATIENT
Start: 2024-05-06 | End: 2024-05-06 | Stop reason: HOSPADM

## 2024-05-06 RX ORDER — METOCLOPRAMIDE 5 MG/1
5 TABLET ORAL 3 TIMES DAILY PRN
Qty: 30 TABLET | Refills: 0 | Status: SHIPPED | OUTPATIENT
Start: 2024-05-06

## 2024-05-06 RX ADMIN — ONDANSETRON 4 MG: 2 INJECTION INTRAMUSCULAR; INTRAVENOUS at 17:58

## 2024-05-06 RX ADMIN — IOPAMIDOL 100 ML: 755 INJECTION, SOLUTION INTRAVENOUS at 19:05

## 2024-05-06 RX ADMIN — HYDROMORPHONE HYDROCHLORIDE 0.5 MG: 1 INJECTION, SOLUTION INTRAMUSCULAR; INTRAVENOUS; SUBCUTANEOUS at 18:09

## 2024-05-06 RX ADMIN — METOCLOPRAMIDE 10 MG: 5 INJECTION, SOLUTION INTRAMUSCULAR; INTRAVENOUS at 19:07

## 2024-05-06 RX ADMIN — SODIUM CHLORIDE 1000 ML: 9 INJECTION, SOLUTION INTRAVENOUS at 17:58

## 2024-05-06 RX ADMIN — HYDROMORPHONE HYDROCHLORIDE 0.5 MG: 1 INJECTION, SOLUTION INTRAMUSCULAR; INTRAVENOUS; SUBCUTANEOUS at 19:48

## 2024-05-08 ENCOUNTER — OFFICE VISIT (OUTPATIENT)
Age: 29
End: 2024-05-08
Payer: COMMERCIAL

## 2024-05-08 VITALS
TEMPERATURE: 98 F | DIASTOLIC BLOOD PRESSURE: 87 MMHG | WEIGHT: 246 LBS | OXYGEN SATURATION: 98 % | BODY MASS INDEX: 38.61 KG/M2 | HEART RATE: 80 BPM | SYSTOLIC BLOOD PRESSURE: 137 MMHG | HEIGHT: 67 IN

## 2024-05-08 DIAGNOSIS — K50.918: ICD-10-CM

## 2024-05-08 DIAGNOSIS — K50.012 CROHN'S DISEASE OF SMALL INTESTINE WITH INTESTINAL OBSTRUCTION: Primary | Chronic | ICD-10-CM

## 2024-05-08 DIAGNOSIS — K50.014 CROHN'S DISEASE OF SMALL INTESTINE WITH ABSCESS: ICD-10-CM

## 2024-05-08 RX ORDER — PANTOPRAZOLE SODIUM 40 MG/1
40 TABLET, DELAYED RELEASE ORAL EVERY MORNING
COMMUNITY
Start: 2024-05-02

## 2024-05-15 ENCOUNTER — APPOINTMENT (OUTPATIENT)
Dept: CT IMAGING | Facility: HOSPITAL | Age: 29
End: 2024-05-15
Payer: COMMERCIAL

## 2024-05-15 ENCOUNTER — HOSPITAL ENCOUNTER (EMERGENCY)
Facility: HOSPITAL | Age: 29
Discharge: HOME | End: 2024-05-15
Payer: COMMERCIAL

## 2024-05-15 VITALS
HEIGHT: 67 IN | DIASTOLIC BLOOD PRESSURE: 63 MMHG | HEART RATE: 63 BPM | RESPIRATION RATE: 16 BRPM | WEIGHT: 250 LBS | OXYGEN SATURATION: 96 % | TEMPERATURE: 98 F | BODY MASS INDEX: 39.24 KG/M2 | SYSTOLIC BLOOD PRESSURE: 112 MMHG

## 2024-05-15 DIAGNOSIS — R11.2 NAUSEA AND VOMITING, UNSPECIFIED VOMITING TYPE: ICD-10-CM

## 2024-05-15 DIAGNOSIS — N83.209 RUPTURED OVARIAN CYST: ICD-10-CM

## 2024-05-15 DIAGNOSIS — Z87.19 HX OF CROHN'S DISEASE: ICD-10-CM

## 2024-05-15 DIAGNOSIS — R10.9 RIGHT SIDED ABDOMINAL PAIN: Primary | ICD-10-CM

## 2024-05-15 LAB
ALBUMIN SERPL-MCNC: 4 G/DL (ref 3.5–5.2)
ALBUMIN/GLOB SERPL: 1.3 G/DL
ALP SERPL-CCNC: 66 U/L (ref 39–117)
ALT SERPL W P-5'-P-CCNC: 52 U/L (ref 1–33)
ANION GAP SERPL CALCULATED.3IONS-SCNC: 11 MMOL/L (ref 5–15)
AST SERPL-CCNC: 26 U/L (ref 1–32)
B-HCG UR QL: NEGATIVE
BACTERIA UR QL AUTO: ABNORMAL /HPF
BACTERIA UR QL AUTO: ABNORMAL /HPF
BASOPHILS # BLD AUTO: 0.03 10*3/MM3 (ref 0–0.2)
BASOPHILS NFR BLD AUTO: 0.3 % (ref 0–1.5)
BILIRUB SERPL-MCNC: 0.4 MG/DL (ref 0–1.2)
BILIRUB UR QL STRIP: NEGATIVE
BILIRUB UR QL STRIP: NEGATIVE
BUN SERPL-MCNC: 10 MG/DL (ref 6–20)
BUN/CREAT SERPL: 14.9 (ref 7–25)
CALCIUM SPEC-SCNC: 9.4 MG/DL (ref 8.6–10.5)
CHLORIDE SERPL-SCNC: 101 MMOL/L (ref 98–107)
CLARITY UR: ABNORMAL
CLARITY UR: CLEAR
CO2 SERPL-SCNC: 26 MMOL/L (ref 22–29)
COLOR UR: YELLOW
COLOR UR: YELLOW
CREAT SERPL-MCNC: 0.67 MG/DL (ref 0.57–1)
DEPRECATED RDW RBC AUTO: 45.1 FL (ref 37–54)
EGFRCR SERPLBLD CKD-EPI 2021: 122.3 ML/MIN/1.73
EOSINOPHIL # BLD AUTO: 0.07 10*3/MM3 (ref 0–0.4)
EOSINOPHIL NFR BLD AUTO: 0.6 % (ref 0.3–6.2)
ERYTHROCYTE [DISTWIDTH] IN BLOOD BY AUTOMATED COUNT: 14 % (ref 12.3–15.4)
GLOBULIN UR ELPH-MCNC: 3.2 GM/DL
GLUCOSE SERPL-MCNC: 85 MG/DL (ref 65–99)
GLUCOSE UR STRIP-MCNC: NEGATIVE MG/DL
GLUCOSE UR STRIP-MCNC: NEGATIVE MG/DL
HCT VFR BLD AUTO: 41.5 % (ref 34–46.6)
HGB BLD-MCNC: 13.4 G/DL (ref 12–15.9)
HGB UR QL STRIP.AUTO: NEGATIVE
HGB UR QL STRIP.AUTO: NEGATIVE
HOLD SPECIMEN: NORMAL
HYALINE CASTS UR QL AUTO: ABNORMAL /LPF
HYALINE CASTS UR QL AUTO: ABNORMAL /LPF
IMM GRANULOCYTES # BLD AUTO: 0.04 10*3/MM3 (ref 0–0.05)
IMM GRANULOCYTES NFR BLD AUTO: 0.3 % (ref 0–0.5)
KETONES UR QL STRIP: NEGATIVE
KETONES UR QL STRIP: NEGATIVE
LEUKOCYTE ESTERASE UR QL STRIP.AUTO: ABNORMAL
LEUKOCYTE ESTERASE UR QL STRIP.AUTO: ABNORMAL
LIPASE SERPL-CCNC: 20 U/L (ref 13–60)
LYMPHOCYTES # BLD AUTO: 3.84 10*3/MM3 (ref 0.7–3.1)
LYMPHOCYTES NFR BLD AUTO: 32.4 % (ref 19.6–45.3)
MCH RBC QN AUTO: 28.4 PG (ref 26.6–33)
MCHC RBC AUTO-ENTMCNC: 32.3 G/DL (ref 31.5–35.7)
MCV RBC AUTO: 87.9 FL (ref 79–97)
MONOCYTES # BLD AUTO: 0.88 10*3/MM3 (ref 0.1–0.9)
MONOCYTES NFR BLD AUTO: 7.4 % (ref 5–12)
NEUTROPHILS NFR BLD AUTO: 59 % (ref 42.7–76)
NEUTROPHILS NFR BLD AUTO: 7 10*3/MM3 (ref 1.7–7)
NITRITE UR QL STRIP: NEGATIVE
NITRITE UR QL STRIP: NEGATIVE
NRBC BLD AUTO-RTO: 0 /100 WBC (ref 0–0.2)
PH UR STRIP.AUTO: 6.5 [PH] (ref 5–8)
PH UR STRIP.AUTO: 7 [PH] (ref 5–8)
PLATELET # BLD AUTO: 517 10*3/MM3 (ref 140–450)
PMV BLD AUTO: 8.8 FL (ref 6–12)
POTASSIUM SERPL-SCNC: 3.7 MMOL/L (ref 3.5–5.2)
PROT SERPL-MCNC: 7.2 G/DL (ref 6–8.5)
PROT UR QL STRIP: NEGATIVE
PROT UR QL STRIP: NEGATIVE
RBC # BLD AUTO: 4.72 10*6/MM3 (ref 3.77–5.28)
RBC # UR STRIP: ABNORMAL /HPF
RBC # UR STRIP: ABNORMAL /HPF
REF LAB TEST METHOD: ABNORMAL
REF LAB TEST METHOD: ABNORMAL
SODIUM SERPL-SCNC: 138 MMOL/L (ref 136–145)
SP GR UR STRIP: 1.01 (ref 1–1.03)
SP GR UR STRIP: 1.01 (ref 1–1.03)
SQUAMOUS #/AREA URNS HPF: ABNORMAL /HPF
SQUAMOUS #/AREA URNS HPF: ABNORMAL /HPF
UROBILINOGEN UR QL STRIP: ABNORMAL
UROBILINOGEN UR QL STRIP: ABNORMAL
WBC # UR STRIP: ABNORMAL /HPF
WBC # UR STRIP: ABNORMAL /HPF
WBC NRBC COR # BLD AUTO: 11.86 10*3/MM3 (ref 3.4–10.8)
WHOLE BLOOD HOLD COAG: NORMAL

## 2024-05-15 PROCEDURE — 96375 TX/PRO/DX INJ NEW DRUG ADDON: CPT

## 2024-05-15 PROCEDURE — 81001 URINALYSIS AUTO W/SCOPE: CPT | Performed by: PHYSICIAN ASSISTANT

## 2024-05-15 PROCEDURE — 25010000002 DROPERIDOL PER 5 MG: Performed by: PHYSICIAN ASSISTANT

## 2024-05-15 PROCEDURE — 25010000002 KETOROLAC TROMETHAMINE PER 15 MG: Performed by: PHYSICIAN ASSISTANT

## 2024-05-15 PROCEDURE — 25010000002 DIPHENHYDRAMINE PER 50 MG: Performed by: PHYSICIAN ASSISTANT

## 2024-05-15 PROCEDURE — 25010000002 HYDROMORPHONE 1 MG/ML SOLUTION: Performed by: PHYSICIAN ASSISTANT

## 2024-05-15 PROCEDURE — 74177 CT ABD & PELVIS W/CONTRAST: CPT

## 2024-05-15 PROCEDURE — 81025 URINE PREGNANCY TEST: CPT | Performed by: PHYSICIAN ASSISTANT

## 2024-05-15 PROCEDURE — 83690 ASSAY OF LIPASE: CPT | Performed by: PHYSICIAN ASSISTANT

## 2024-05-15 PROCEDURE — 25510000001 IOPAMIDOL PER 1 ML: Performed by: PHYSICIAN ASSISTANT

## 2024-05-15 PROCEDURE — 85025 COMPLETE CBC W/AUTO DIFF WBC: CPT | Performed by: PHYSICIAN ASSISTANT

## 2024-05-15 PROCEDURE — 96374 THER/PROPH/DIAG INJ IV PUSH: CPT

## 2024-05-15 PROCEDURE — 25010000002 ONDANSETRON PER 1 MG: Performed by: PHYSICIAN ASSISTANT

## 2024-05-15 PROCEDURE — 99285 EMERGENCY DEPT VISIT HI MDM: CPT

## 2024-05-15 PROCEDURE — 80053 COMPREHEN METABOLIC PANEL: CPT | Performed by: PHYSICIAN ASSISTANT

## 2024-05-15 RX ORDER — DIPHENHYDRAMINE HYDROCHLORIDE 50 MG/ML
25 INJECTION INTRAMUSCULAR; INTRAVENOUS ONCE
Status: COMPLETED | OUTPATIENT
Start: 2024-05-15 | End: 2024-05-15

## 2024-05-15 RX ORDER — SODIUM CHLORIDE 0.9 % (FLUSH) 0.9 %
10 SYRINGE (ML) INJECTION AS NEEDED
Status: DISCONTINUED | OUTPATIENT
Start: 2024-05-15 | End: 2024-05-15 | Stop reason: HOSPADM

## 2024-05-15 RX ORDER — ONDANSETRON 2 MG/ML
4 INJECTION INTRAMUSCULAR; INTRAVENOUS ONCE
Status: COMPLETED | OUTPATIENT
Start: 2024-05-15 | End: 2024-05-15

## 2024-05-15 RX ORDER — DROPERIDOL 2.5 MG/ML
1.25 INJECTION, SOLUTION INTRAMUSCULAR; INTRAVENOUS ONCE
Status: COMPLETED | OUTPATIENT
Start: 2024-05-15 | End: 2024-05-15

## 2024-05-15 RX ORDER — ONDANSETRON 2 MG/ML
4 INJECTION INTRAMUSCULAR; INTRAVENOUS ONCE
Status: DISCONTINUED | OUTPATIENT
Start: 2024-05-15 | End: 2024-05-15

## 2024-05-15 RX ORDER — KETOROLAC TROMETHAMINE 30 MG/ML
30 INJECTION, SOLUTION INTRAMUSCULAR; INTRAVENOUS ONCE
Status: COMPLETED | OUTPATIENT
Start: 2024-05-15 | End: 2024-05-15

## 2024-05-15 RX ADMIN — DIPHENHYDRAMINE HYDROCHLORIDE 25 MG: 50 INJECTION, SOLUTION INTRAMUSCULAR; INTRAVENOUS at 12:08

## 2024-05-15 RX ADMIN — Medication 10 ML: at 09:31

## 2024-05-15 RX ADMIN — HYDROMORPHONE HYDROCHLORIDE 0.5 MG: 1 INJECTION, SOLUTION INTRAMUSCULAR; INTRAVENOUS; SUBCUTANEOUS at 10:27

## 2024-05-15 RX ADMIN — IOPAMIDOL 100 ML: 755 INJECTION, SOLUTION INTRAVENOUS at 12:38

## 2024-05-15 RX ADMIN — ONDANSETRON 4 MG: 2 INJECTION INTRAMUSCULAR; INTRAVENOUS at 09:31

## 2024-05-15 RX ADMIN — DROPERIDOL 1.25 MG: 2.5 INJECTION, SOLUTION INTRAMUSCULAR; INTRAVENOUS at 12:09

## 2024-05-15 RX ADMIN — KETOROLAC TROMETHAMINE 30 MG: 30 INJECTION, SOLUTION INTRAMUSCULAR at 09:31

## 2024-05-15 NOTE — ED PROVIDER NOTES
Subjective   History of Present Illness  Chief Complaint: Abdominal pain    Patient is a 28-year-old female history of Crohn's disease presents to the ER with complaints of right-sided abdominal pain for few days.  Of note patient was recently admitted at the end of April for intractable abdominal pain found to have terminal ileitis with with possible fistula versus microperforation.  Patient was admitted and started on antibiotics and steroids.  Patient presents today complaining of return of the right sided abdominal pain.  It is not worse than before.  She describes as a sharp stabbing pain that she rates an 8/10.  She states she thinks she is having a flare.  She reports some nausea and 2 episodes of vomiting.  She reports some mildly loose stool but no hematochezia or melena.  No dysuria or hematuria.  No chest pain shortness of breath headache or fever.  She has had prior cholecystectomy.  Patient states she is due to start [employer] this week.    PCP: Tammi Cano  Colorectal: Katherine    History provided by:  Patient      Review of Systems   Constitutional:  Negative for fever.   HENT:  Negative for sore throat and trouble swallowing.    Eyes: Negative.    Respiratory:  Negative for shortness of breath and wheezing.    Cardiovascular:  Negative for chest pain.   Gastrointestinal:  Positive for abdominal pain, diarrhea, nausea and vomiting.   Endocrine: Negative.    Genitourinary:  Negative for difficulty urinating and dysuria.   Musculoskeletal:  Negative for myalgias.   Skin:  Negative for rash.   Allergic/Immunologic: Negative.    Neurological:  Negative for headaches.   Psychiatric/Behavioral:  Negative for behavioral problems.    All other systems reviewed and are negative.      Past Medical History:   Diagnosis Date    Crohn's disease     Psoriasis        No Known Allergies    Past Surgical History:   Procedure Laterality Date    LAPAROSCOPIC CHOLECYSTECTOMY      ROTATOR CUFF REPAIR Right        Family  History   Problem Relation Age of Onset    No Known Problems Mother     No Known Problems Father     Cancer Maternal Grandmother        Social History     Socioeconomic History    Marital status:    Tobacco Use    Smoking status: Never    Smokeless tobacco: Never   Vaping Use    Vaping status: Never Used   Substance and Sexual Activity    Alcohol use: Not Currently    Drug use: Never    Sexual activity: Defer           Objective   Physical Exam  Vitals reviewed.   Constitutional:       Appearance: Normal appearance. She is well-developed. She is not ill-appearing or toxic-appearing.   HENT:      Head: Normocephalic and atraumatic.   Eyes:      Extraocular Movements: Extraocular movements intact.      Pupils: Pupils are equal, round, and reactive to light.   Cardiovascular:      Rate and Rhythm: Normal rate and regular rhythm.      Heart sounds: Normal heart sounds. No murmur heard.  Pulmonary:      Effort: Pulmonary effort is normal. No respiratory distress.      Breath sounds: Normal breath sounds. No wheezing.   Abdominal:      General: Bowel sounds are normal. There is no distension.      Palpations: Abdomen is soft.      Tenderness: There is abdominal tenderness in the right upper quadrant and right lower quadrant. There is no right CVA tenderness, left CVA tenderness or guarding.   Skin:     General: Skin is warm and dry.      Capillary Refill: Capillary refill takes less than 2 seconds.      Findings: No rash.   Neurological:      General: No focal deficit present.      Mental Status: She is alert and oriented to person, place, and time.   Psychiatric:         Mood and Affect: Mood normal.         Behavior: Behavior normal.         Procedures           ED Course  ED Course as of 05/15/24 1740   Wed May 15, 2024   1114 Patient reports pain has improved but nausea seems to be worse [MC]   1204 Spoke with patient's colorectal surgeon Dr. Murphy recommended repeat CT scan [MC]   1304 Waiting for CT to be  "read []   1330 Waiting for CT to be read []      ED Course User Index  [] Litzy Ordonez, PA    /63   Pulse 63   Temp 98 °F (36.7 °C) (Oral)   Resp 16   Ht 170.2 cm (67\")   Wt 113 kg (250 lb)   LMP 04/27/2024   SpO2 96%   BMI 39.16 kg/m²   Labs Reviewed   COMPREHENSIVE METABOLIC PANEL - Abnormal; Notable for the following components:       Result Value    ALT (SGPT) 52 (*)     All other components within normal limits    Narrative:     GFR Normal >60  Chronic Kidney Disease <60  Kidney Failure <15     URINALYSIS W/ MICROSCOPIC IF INDICATED (NO CULTURE) - Abnormal; Notable for the following components:    Appearance, UA Slightly Cloudy (*)     Leuk Esterase, UA Moderate (2+) (*)     All other components within normal limits   CBC WITH AUTO DIFFERENTIAL - Abnormal; Notable for the following components:    WBC 11.86 (*)     Platelets 517 (*)     Lymphocytes, Absolute 3.84 (*)     All other components within normal limits   URINALYSIS, MICROSCOPIC ONLY - Abnormal; Notable for the following components:    WBC, UA 11-20 (*)     Bacteria, UA 2+ (*)     Squamous Epithelial Cells, UA 7-12 (*)     All other components within normal limits   URINALYSIS W/ MICROSCOPIC IF INDICATED (NO CULTURE) - Abnormal; Notable for the following components:    Leuk Esterase, UA Small (1+) (*)     All other components within normal limits   URINALYSIS, MICROSCOPIC ONLY - Abnormal; Notable for the following components:    WBC, UA 6-10 (*)     Bacteria, UA Trace (*)     Squamous Epithelial Cells, UA 3-6 (*)     All other components within normal limits   LIPASE - Normal   PREGNANCY, URINE - Normal   CBC AND DIFFERENTIAL    Narrative:     The following orders were created for panel order CBC & Differential.  Procedure                               Abnormality         Status                     ---------                               -----------         ------                     CBC Auto Differential[[id number]]        " Abnormal            Final result                 Please view results for these tests on the individual orders.   EXTRA TUBES    Narrative:     The following orders were created for panel order Extra Tubes.  Procedure                               Abnormality         Status                     ---------                               -----------         ------                     Gold Top - SST[[id number]]                                   Final result               Light Blue Top[[id number]]                                   Final result                 Please view results for these tests on the individual orders.   GOLD TOP - SST   LIGHT BLUE TOP     Medications   ketorolac (TORADOL) injection 30 mg (30 mg Intravenous Given 5/15/24 0931)   ondansetron (ZOFRAN) injection 4 mg (4 mg Intravenous Given 5/15/24 0931)   HYDROmorphone (DILAUDID) injection 0.5 mg (0.5 mg Intravenous Given 5/15/24 1027)   droperidol (INAPSINE) injection 1.25 mg (1.25 mg Intravenous Given 5/15/24 1209)   diphenhydrAMINE (BENADRYL) injection 25 mg (25 mg Intravenous Given 5/15/24 1208)   iopamidol (ISOVUE-370) 76 % injection 100 mL (100 mL Intravenous Given 5/15/24 1238)     CT Abdomen Pelvis With Contrast    Result Date: 5/15/2024  1.     Terminal ileitis with findings similar to the most recent prior exam from May 6, 2024. No definite new ectopic bowel gas or collection is identified. 2.     Probable recently ruptured follicle in the left ovary with trace fluid in the pelvis. 3.     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 4.     Findings of possible subtle sacroiliitis which can be seen with inflammatory bowel disease. 5.     L5 pars defects.  Electronically Signed By-Guille Neri MD On:5/15/2024 1:28 PM                                              Medical Decision Making  Differential Dx (Includes but not limited to): Diverticulitis obstruction abscess perforation Crohn's flare cancer ovarian  cyst  Medical Records Reviewed: Patient admitted 3 weeks ago for Crohn's flare with suspected microperforation and phlegmon.  Patient's most recent CT scan 5/6/2024 showing improving terminal ileitis.  Patient was seen by her colorectal surgeon, Dr. Murphy, 5/8/2024 for follow-up, patient due to start [employer] in the near future and per surgery note may consider surgical intervention in 6 weeks if not improved.  Labs: On my interpretation urinalysis negative for UTI, 6-10 WBCs trace bacteria 3-6 squamous patient is asymptomatic.  CBC mild leukocytosis 11,000.  CMP unremarkable.  Lipase normal.  Imaging: Imaging was reviewed by myself, independently interpreted by radiology, terminal ileitis with findings similar to most recent scan from 5/6/2024.  Probable recent ruptured follicle in the left ovary.  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  No evidence of perforation or abscess.  Telemetry: N/A  Testing considered but not ordered: Pelvic ultrasound, no evidence of torsion, cystic structure on the right side is chronic  Nature of Complaint: Acute  Admission vs Discharge: Discharge      Discussion: While in the ED IV was placed and labs were obtained appropriate PPE was worn during exam and throughout all encounters with the patient.  Patient had the above evaluation.  She is afebrile, nontoxic in appearance and in no acute respiratory distress.  She was given multiple doses of pain and nausea medication and ultimately had best relief with droperidol and Benadryl.  She had no vomiting while in the emergency department.  Vital signs stable.  Lab work as noted above, she has a mild leukocytosis but this is improved compared to previous.  Urinalysis negative for gross UTI, does appear slightly contaminated and patient is asymptomatic.  Discussed imaging with patient's colorectal surgeon who recommended repeat imaging due to recent history of abscess and phlegmon, CT scan today does not show abscess or  perforation but does show chronic findings of the terminal ileitis with note significant changes.  Findings discussed with patient and family at bedside.  I see no indication for admission at this time.  She will be discharged, she has Zofran at home.  Patient was counseled on importance for following appropriate diet for Crohn's to avoid spicy greasy acidic, fried foods that may exacerbate her pain or cause flare.  She verbalized understanding.    Discharge plan and instructions were discussed with the patient who verbalized understanding and is in agreement with the plan, all questions were answered at this time.  Patient is aware of signs symptoms that would require immediate return to the emergency room.  Patient understands importance of following up with primary care provider for further evaluation and worsening concerns as well as blood pressure recheck in the next 4 weeks.    Patient was discharged in improved stable condition with an upright steady gait.    Patient is aware that discharge does not mean that nothing is wrong but indicates no emergencies present and they must continue care with follow-up as given below or physician of their choice.    Problems Addressed:  Hx of Crohn's disease: acute illness or injury  Nausea and vomiting, unspecified vomiting type: acute illness or injury  Right sided abdominal pain: acute illness or injury  Ruptured ovarian cyst: acute illness or injury    Amount and/or Complexity of Data Reviewed  External Data Reviewed: radiology.  Labs: ordered. Decision-making details documented in ED Course.  Radiology: ordered. Decision-making details documented in ED Course.    Risk  Prescription drug management.  Parenteral controlled substances.        Final diagnoses:   Right sided abdominal pain   Nausea and vomiting, unspecified vomiting type   Ruptured ovarian cyst   Hx of Crohn's disease       ED Disposition  ED Disposition       ED Disposition   Discharge    Condition    Stable    Comment   --               Tammi Cano, APRN  [address] [facility]  [address]  [place] [address] [phone]    Schedule an appointment as soon as possible for a visit in 2 days  As needed, If symptoms worsen    Sachin Murphy MD  [address] [facility] [address] [facility] [address]  [place] [address]  [phone]    Schedule an appointment as soon as possible for a visit in 2 days  As needed, If symptoms worsen         Medication List        Stop      predniSONE 20 MG tablet  Commonly known as: Litzy Wynne PA  05/15/24 0431

## 2024-05-15 NOTE — DISCHARGE INSTRUCTIONS
Tylenol for pain.  Take your Zofran they have at home for nausea vomiting    Drink plenty of fluids    Try to avoid spicy greasy acidic and fried foods as this can make it worse.  Try to decrease your dairy intake as well    Follow-up with your colorectal surgeon for recheck as needed    Return to the ER for new or worsening symptoms

## 2024-05-20 ENCOUNTER — OFFICE (OUTPATIENT)
Dept: URBAN - METROPOLITAN AREA INFUSION 3 | Facility: INFUSION | Age: 29
End: 2024-05-20
Payer: COMMERCIAL

## 2024-05-20 VITALS
SYSTOLIC BLOOD PRESSURE: 152 MMHG | DIASTOLIC BLOOD PRESSURE: 89 MMHG | HEART RATE: 89 BPM | HEART RATE: 85 BPM | DIASTOLIC BLOOD PRESSURE: 84 MMHG | TEMPERATURE: 98.9 F | DIASTOLIC BLOOD PRESSURE: 86 MMHG | DIASTOLIC BLOOD PRESSURE: 95 MMHG | SYSTOLIC BLOOD PRESSURE: 132 MMHG | HEART RATE: 88 BPM | HEART RATE: 98 BPM | TEMPERATURE: 98.6 F | SYSTOLIC BLOOD PRESSURE: 131 MMHG | DIASTOLIC BLOOD PRESSURE: 85 MMHG | HEIGHT: 67 IN | SYSTOLIC BLOOD PRESSURE: 130 MMHG | WEIGHT: 244.4 LBS | HEART RATE: 79 BPM | RESPIRATION RATE: 16 BRPM | SYSTOLIC BLOOD PRESSURE: 139 MMHG | SYSTOLIC BLOOD PRESSURE: 127 MMHG

## 2024-05-20 DIAGNOSIS — K50.814 CROHN'S DISEASE OF BOTH SMALL AND LARGE INTESTINE WITH ABSCE: ICD-10-CM

## 2024-05-20 PROCEDURE — 96375 TX/PRO/DX INJ NEW DRUG ADDON: CPT | Performed by: INTERNAL MEDICINE

## 2024-05-20 PROCEDURE — 96413 CHEMO IV INFUSION 1 HR: CPT | Performed by: INTERNAL MEDICINE

## 2024-05-20 RX ADMIN — ONDANSETRON HYDROCHLORIDE 4 MG: 4 SOLUTION ORAL at 09:30

## 2024-05-20 NOTE — SERVICENOTES
NEGATIVE PPD or Quantiferon Gold: 4/2024
MEDS PROVIDED BY [facility]
cbc cmp crp obtained prior to infusion

## 2024-06-17 ENCOUNTER — OFFICE (OUTPATIENT)
Dept: URBAN - METROPOLITAN AREA INFUSION 3 | Facility: INFUSION | Age: 29
End: 2024-06-17
Payer: COMMERCIAL

## 2024-06-17 VITALS
RESPIRATION RATE: 16 BRPM | DIASTOLIC BLOOD PRESSURE: 75 MMHG | DIASTOLIC BLOOD PRESSURE: 77 MMHG | HEIGHT: 67 IN | WEIGHT: 251 LBS | SYSTOLIC BLOOD PRESSURE: 125 MMHG | DIASTOLIC BLOOD PRESSURE: 85 MMHG | SYSTOLIC BLOOD PRESSURE: 122 MMHG | TEMPERATURE: 98.3 F | TEMPERATURE: 98.6 F | TEMPERATURE: 98.7 F | RESPIRATION RATE: 17 BRPM | DIASTOLIC BLOOD PRESSURE: 72 MMHG | SYSTOLIC BLOOD PRESSURE: 131 MMHG | DIASTOLIC BLOOD PRESSURE: 71 MMHG | HEART RATE: 70 BPM | SYSTOLIC BLOOD PRESSURE: 144 MMHG | HEART RATE: 74 BPM | SYSTOLIC BLOOD PRESSURE: 127 MMHG | HEART RATE: 71 BPM

## 2024-06-17 DIAGNOSIS — K50.818 CROHN'S DISEASE OF BOTH SMALL AND LARGE INTESTINE WITH OTHER: ICD-10-CM

## 2024-06-17 DIAGNOSIS — K50.814 CROHN'S DISEASE OF BOTH SMALL AND LARGE INTESTINE WITH ABSCE: ICD-10-CM

## 2024-06-17 PROCEDURE — 96413 CHEMO IV INFUSION 1 HR: CPT | Performed by: INTERNAL MEDICINE

## 2024-06-17 NOTE — SERVICENOTES
NEGATIVE PPD or Quantiferon Gold: 4/2024
MEDS PROVIDED BY [facility]
cbc cmp crp obtained prior to infusion

## 2024-06-21 ENCOUNTER — TELEPHONE (OUTPATIENT)
Dept: SURGERY | Facility: CLINIC | Age: 29
End: 2024-06-21
Payer: COMMERCIAL

## 2024-06-21 NOTE — TELEPHONE ENCOUNTER
06- Called Mrs [facility] to see if she needed to r/s her appt, she had canceled her appt for Monday 6-. [facility] for her to call us back

## 2024-07-15 ENCOUNTER — OFFICE (OUTPATIENT)
Dept: URBAN - METROPOLITAN AREA INFUSION 3 | Facility: INFUSION | Age: 29
End: 2024-07-15
Payer: COMMERCIAL

## 2024-07-15 VITALS
HEART RATE: 72 BPM | SYSTOLIC BLOOD PRESSURE: 123 MMHG | RESPIRATION RATE: 16 BRPM | TEMPERATURE: 98.4 F | HEART RATE: 68 BPM | HEART RATE: 73 BPM | SYSTOLIC BLOOD PRESSURE: 126 MMHG | SYSTOLIC BLOOD PRESSURE: 118 MMHG | TEMPERATURE: 98 F | DIASTOLIC BLOOD PRESSURE: 78 MMHG | HEART RATE: 71 BPM | HEART RATE: 66 BPM | TEMPERATURE: 98.7 F | SYSTOLIC BLOOD PRESSURE: 128 MMHG | DIASTOLIC BLOOD PRESSURE: 72 MMHG | DIASTOLIC BLOOD PRESSURE: 80 MMHG | DIASTOLIC BLOOD PRESSURE: 73 MMHG | WEIGHT: 251 LBS | SYSTOLIC BLOOD PRESSURE: 137 MMHG | HEIGHT: 67 IN

## 2024-07-15 DIAGNOSIS — K50.818 CROHN'S DISEASE OF BOTH SMALL AND LARGE INTESTINE WITH OTHER: ICD-10-CM

## 2024-07-15 PROCEDURE — 96413 CHEMO IV INFUSION 1 HR: CPT | Performed by: INTERNAL MEDICINE

## 2024-07-15 NOTE — SERVICENOTES
NEGATIVE PPD or Quantiferon Gold: 4/2024
MEDS PROVIDED BY [facility]
cbc cmp crp obtained prior to infusion

## 2024-09-03 ENCOUNTER — OFFICE (OUTPATIENT)
Age: 29
End: 2024-09-03
Payer: COMMERCIAL

## 2024-09-03 ENCOUNTER — OFFICE (OUTPATIENT)
Dept: URBAN - METROPOLITAN AREA CLINIC 64 | Facility: CLINIC | Age: 29
End: 2024-09-03
Payer: COMMERCIAL

## 2024-09-03 VITALS
HEIGHT: 67 IN | WEIGHT: 260 LBS | SYSTOLIC BLOOD PRESSURE: 135 MMHG | SYSTOLIC BLOOD PRESSURE: 135 MMHG | HEART RATE: 84 BPM | WEIGHT: 260 LBS | HEIGHT: 67 IN | DIASTOLIC BLOOD PRESSURE: 85 MMHG | HEART RATE: 84 BPM | SYSTOLIC BLOOD PRESSURE: 135 MMHG | WEIGHT: 260 LBS | DIASTOLIC BLOOD PRESSURE: 85 MMHG | DIASTOLIC BLOOD PRESSURE: 85 MMHG | WEIGHT: 260 LBS | SYSTOLIC BLOOD PRESSURE: 135 MMHG | DIASTOLIC BLOOD PRESSURE: 85 MMHG | WEIGHT: 260 LBS | HEIGHT: 67 IN | HEIGHT: 67 IN | SYSTOLIC BLOOD PRESSURE: 135 MMHG | DIASTOLIC BLOOD PRESSURE: 85 MMHG | HEIGHT: 67 IN | HEART RATE: 84 BPM | DIASTOLIC BLOOD PRESSURE: 85 MMHG | SYSTOLIC BLOOD PRESSURE: 135 MMHG | SYSTOLIC BLOOD PRESSURE: 135 MMHG | HEART RATE: 84 BPM | WEIGHT: 260 LBS | HEART RATE: 84 BPM | HEIGHT: 67 IN | HEIGHT: 67 IN | HEART RATE: 84 BPM | DIASTOLIC BLOOD PRESSURE: 85 MMHG | HEART RATE: 84 BPM | WEIGHT: 260 LBS

## 2024-09-03 DIAGNOSIS — R11.2 NAUSEA WITH VOMITING, UNSPECIFIED: ICD-10-CM

## 2024-09-03 DIAGNOSIS — K50.818 CROHN'S DISEASE OF BOTH SMALL AND LARGE INTESTINE WITH OTHER: ICD-10-CM

## 2024-09-03 DIAGNOSIS — R19.7 DIARRHEA, UNSPECIFIED: ICD-10-CM

## 2024-09-03 PROCEDURE — 99214 OFFICE O/P EST MOD 30 MIN: CPT | Performed by: INTERNAL MEDICINE
